# Patient Record
Sex: FEMALE | Race: WHITE | Employment: OTHER | URBAN - METROPOLITAN AREA
[De-identification: names, ages, dates, MRNs, and addresses within clinical notes are randomized per-mention and may not be internally consistent; named-entity substitution may affect disease eponyms.]

---

## 2017-02-08 ENCOUNTER — HOSPITAL ENCOUNTER (OUTPATIENT)
Dept: MAMMOGRAPHY | Age: 72
Discharge: HOME | End: 2017-02-08
Attending: FAMILY MEDICINE
Payer: MEDICARE

## 2017-02-08 DIAGNOSIS — Z12.31 VISIT FOR SCREENING MAMMOGRAM: ICD-10-CM

## 2017-02-08 PROCEDURE — 77067 SCR MAMMO BI INCL CAD: CPT

## 2017-03-07 ENCOUNTER — HOSPITAL ENCOUNTER (OUTPATIENT)
Dept: MAMMOGRAPHY | Age: 72
Discharge: HOME | End: 2017-03-07
Attending: FAMILY MEDICINE
Payer: MEDICARE

## 2017-03-07 DIAGNOSIS — Z78.0 MENOPAUSE: ICD-10-CM

## 2017-03-07 PROCEDURE — 77080 DXA BONE DENSITY AXIAL: CPT

## 2018-02-10 ENCOUNTER — HOSPITAL ENCOUNTER (OUTPATIENT)
Dept: MAMMOGRAPHY | Age: 73
Discharge: HOME | End: 2018-02-10
Attending: FAMILY MEDICINE
Payer: MEDICARE

## 2018-02-10 DIAGNOSIS — Z12.39 SCREENING BREAST EXAMINATION: ICD-10-CM

## 2018-02-10 PROCEDURE — 77067 SCR MAMMO BI INCL CAD: CPT

## 2018-08-20 PROBLEM — I49.5 SICK SINUS SYNDROME (HCC): Status: ACTIVE | Noted: 2018-08-20

## 2019-02-21 ENCOUNTER — HOSPITAL ENCOUNTER (OUTPATIENT)
Dept: MAMMOGRAPHY | Age: 74
Discharge: HOME | End: 2019-02-21
Attending: FAMILY MEDICINE
Payer: MEDICARE

## 2019-02-21 DIAGNOSIS — Z12.31 VISIT FOR SCREENING MAMMOGRAM: ICD-10-CM

## 2019-02-21 PROCEDURE — 77067 SCR MAMMO BI INCL CAD: CPT

## 2019-03-27 ENCOUNTER — HOSPITAL ENCOUNTER (INPATIENT)
Age: 74
LOS: 5 days | Discharge: HOME | DRG: 189 | End: 2019-04-01
Attending: EMERGENCY MEDICINE | Admitting: INTERNAL MEDICINE
Payer: MEDICARE

## 2019-03-27 ENCOUNTER — APPOINTMENT (OUTPATIENT)
Dept: GENERAL RADIOLOGY | Age: 74
DRG: 189 | End: 2019-03-27
Attending: EMERGENCY MEDICINE
Payer: MEDICARE

## 2019-03-27 DIAGNOSIS — I16.0 HYPERTENSIVE URGENCY: ICD-10-CM

## 2019-03-27 DIAGNOSIS — J81.0 ACUTE PULMONARY EDEMA (HCC): Primary | ICD-10-CM

## 2019-03-27 DIAGNOSIS — N18.9 CHRONIC KIDNEY DISEASE, UNSPECIFIED CKD STAGE: ICD-10-CM

## 2019-03-27 DIAGNOSIS — J93.83 SPONTANEOUS PNEUMOTHORAX: ICD-10-CM

## 2019-03-27 PROBLEM — J81.1 PULMONARY EDEMA: Status: ACTIVE | Noted: 2019-03-27

## 2019-03-27 LAB
ALBUMIN SERPL-MCNC: 3.6 G/DL (ref 3.2–4.6)
ALBUMIN/GLOB SERPL: 0.8 {RATIO} (ref 1.2–3.5)
ALP SERPL-CCNC: 85 U/L (ref 50–136)
ALT SERPL-CCNC: 25 U/L (ref 12–65)
ANION GAP SERPL CALC-SCNC: 10 MMOL/L (ref 7–16)
ARTERIAL PATENCY WRIST A: YES
AST SERPL-CCNC: 40 U/L (ref 15–37)
ATRIAL RATE: 130 BPM
BASE DEFICIT BLD-SCNC: 3 MMOL/L
BASOPHILS # BLD: 0.1 K/UL (ref 0–0.2)
BASOPHILS NFR BLD: 1 % (ref 0–2)
BDY SITE: ABNORMAL
BILIRUB SERPL-MCNC: 0.6 MG/DL (ref 0.2–1.1)
BNP SERPL-MCNC: 242 PG/ML
BODY TEMPERATURE: 98.6
BUN SERPL-MCNC: 19 MG/DL (ref 8–23)
CALCIUM SERPL-MCNC: 9.2 MG/DL (ref 8.3–10.4)
CALCULATED P AXIS, ECG09: 70 DEGREES
CALCULATED R AXIS, ECG10: 85 DEGREES
CALCULATED T AXIS, ECG11: 39 DEGREES
CHLORIDE SERPL-SCNC: 108 MMOL/L (ref 98–107)
CO2 BLD-SCNC: 25 MMOL/L
CO2 SERPL-SCNC: 22 MMOL/L (ref 21–32)
COLLECT TIME,HTIME: 1510
CREAT SERPL-MCNC: 1.13 MG/DL (ref 0.6–1)
DIAGNOSIS, 93000: NORMAL
DIFFERENTIAL METHOD BLD: ABNORMAL
EOSINOPHIL # BLD: 0.3 K/UL (ref 0–0.8)
EOSINOPHIL NFR BLD: 2 % (ref 0.5–7.8)
ERYTHROCYTE [DISTWIDTH] IN BLOOD BY AUTOMATED COUNT: 13.6 % (ref 11.9–14.6)
EXHALED MINUTE VOLUME, VE: 19.5 L/MIN
GAS FLOW.O2 O2 DELIVERY SYS: ABNORMAL L/MIN
GLOBULIN SER CALC-MCNC: 4.4 G/DL (ref 2.3–3.5)
GLUCOSE SERPL-MCNC: 194 MG/DL (ref 65–100)
HCO3 BLD-SCNC: 23.9 MMOL/L (ref 22–26)
HCT VFR BLD AUTO: 44.4 % (ref 35.8–46.3)
HGB BLD-MCNC: 14.5 G/DL (ref 11.7–15.4)
IMM GRANULOCYTES # BLD AUTO: 0.1 K/UL (ref 0–0.5)
IMM GRANULOCYTES NFR BLD AUTO: 1 % (ref 0–5)
LACTATE BLD-SCNC: 2.29 MMOL/L (ref 0.5–1.9)
LYMPHOCYTES # BLD: 4.5 K/UL (ref 0.5–4.6)
LYMPHOCYTES NFR BLD: 37 % (ref 13–44)
MCH RBC QN AUTO: 33.2 PG (ref 26.1–32.9)
MCHC RBC AUTO-ENTMCNC: 32.7 G/DL (ref 31.4–35)
MCV RBC AUTO: 101.6 FL (ref 79.6–97.8)
MONOCYTES # BLD: 0.7 K/UL (ref 0.1–1.3)
MONOCYTES NFR BLD: 5 % (ref 4–12)
NEUTS SEG # BLD: 6.4 K/UL (ref 1.7–8.2)
NEUTS SEG NFR BLD: 53 % (ref 43–78)
NRBC # BLD: 0 K/UL (ref 0–0.2)
O2/TOTAL GAS SETTING VFR VENT: 40 %
P-R INTERVAL, ECG05: 136 MS
PCO2 BLD: 47.1 MMHG (ref 35–45)
PEEP RESPIRATORY: 12 CMH2O
PH BLD: 7.31 [PH] (ref 7.35–7.45)
PLATELET # BLD AUTO: 229 K/UL (ref 150–450)
PMV BLD AUTO: 11.5 FL (ref 9.4–12.3)
PO2 BLD: 93 MMHG (ref 75–100)
POTASSIUM SERPL-SCNC: 3.9 MMOL/L (ref 3.5–5.1)
PROT SERPL-MCNC: 8 G/DL (ref 6.3–8.2)
Q-T INTERVAL, ECG07: 380 MS
QRS DURATION, ECG06: 134 MS
QTC CALCULATION (BEZET), ECG08: 544 MS
RBC # BLD AUTO: 4.37 M/UL (ref 4.05–5.2)
SAO2 % BLD: 96 % (ref 95–98)
SERVICE CMNT-IMP: ABNORMAL
SODIUM SERPL-SCNC: 140 MMOL/L (ref 136–145)
SPECIMEN TYPE: ABNORMAL
TOTAL RESP. RATE, ITRR: 28
TROPONIN I BLD-MCNC: 0.02 NG/ML (ref 0.02–0.05)
VENTRICULAR RATE, ECG03: 123 BPM
WBC # BLD AUTO: 12 K/UL (ref 4.3–11.1)

## 2019-03-27 PROCEDURE — 74011250637 HC RX REV CODE- 250/637: Performed by: INTERNAL MEDICINE

## 2019-03-27 PROCEDURE — 85025 COMPLETE CBC W/AUTO DIFF WBC: CPT

## 2019-03-27 PROCEDURE — 93005 ELECTROCARDIOGRAM TRACING: CPT | Performed by: EMERGENCY MEDICINE

## 2019-03-27 PROCEDURE — 74011250637 HC RX REV CODE- 250/637: Performed by: EMERGENCY MEDICINE

## 2019-03-27 PROCEDURE — 74011250636 HC RX REV CODE- 250/636: Performed by: EMERGENCY MEDICINE

## 2019-03-27 PROCEDURE — 36600 WITHDRAWAL OF ARTERIAL BLOOD: CPT

## 2019-03-27 PROCEDURE — 83605 ASSAY OF LACTIC ACID: CPT

## 2019-03-27 PROCEDURE — 74011250636 HC RX REV CODE- 250/636: Performed by: INTERNAL MEDICINE

## 2019-03-27 PROCEDURE — 99285 EMERGENCY DEPT VISIT HI MDM: CPT | Performed by: EMERGENCY MEDICINE

## 2019-03-27 PROCEDURE — 71045 X-RAY EXAM CHEST 1 VIEW: CPT

## 2019-03-27 PROCEDURE — 65660000000 HC RM CCU STEPDOWN

## 2019-03-27 PROCEDURE — 96374 THER/PROPH/DIAG INJ IV PUSH: CPT | Performed by: EMERGENCY MEDICINE

## 2019-03-27 PROCEDURE — 82803 BLOOD GASES ANY COMBINATION: CPT

## 2019-03-27 PROCEDURE — 83880 ASSAY OF NATRIURETIC PEPTIDE: CPT

## 2019-03-27 PROCEDURE — 80053 COMPREHEN METABOLIC PANEL: CPT

## 2019-03-27 PROCEDURE — 94660 CPAP INITIATION&MGMT: CPT

## 2019-03-27 PROCEDURE — 84484 ASSAY OF TROPONIN QUANT: CPT

## 2019-03-27 RX ORDER — ONDANSETRON 2 MG/ML
4 INJECTION INTRAMUSCULAR; INTRAVENOUS
Status: DISCONTINUED | OUTPATIENT
Start: 2019-03-27 | End: 2019-04-01 | Stop reason: HOSPADM

## 2019-03-27 RX ORDER — SIMVASTATIN 10 MG/1
10 TABLET, FILM COATED ORAL
Status: DISCONTINUED | OUTPATIENT
Start: 2019-03-27 | End: 2019-04-01 | Stop reason: HOSPADM

## 2019-03-27 RX ORDER — MORPHINE SULFATE 2 MG/ML
2 INJECTION, SOLUTION INTRAMUSCULAR; INTRAVENOUS
Status: DISCONTINUED | OUTPATIENT
Start: 2019-03-27 | End: 2019-04-01 | Stop reason: HOSPADM

## 2019-03-27 RX ORDER — DILTIAZEM HYDROCHLORIDE 180 MG/1
360 CAPSULE, COATED, EXTENDED RELEASE ORAL DAILY
Status: DISCONTINUED | OUTPATIENT
Start: 2019-03-27 | End: 2019-03-28

## 2019-03-27 RX ORDER — SODIUM CHLORIDE 0.9 % (FLUSH) 0.9 %
5-40 SYRINGE (ML) INJECTION AS NEEDED
Status: DISCONTINUED | OUTPATIENT
Start: 2019-03-27 | End: 2019-04-01 | Stop reason: HOSPADM

## 2019-03-27 RX ORDER — SODIUM CHLORIDE 0.9 % (FLUSH) 0.9 %
5-40 SYRINGE (ML) INJECTION EVERY 8 HOURS
Status: DISCONTINUED | OUTPATIENT
Start: 2019-03-27 | End: 2019-04-01 | Stop reason: HOSPADM

## 2019-03-27 RX ORDER — FUROSEMIDE 10 MG/ML
40 INJECTION INTRAMUSCULAR; INTRAVENOUS 2 TIMES DAILY
Status: DISCONTINUED | OUTPATIENT
Start: 2019-03-28 | End: 2019-03-29

## 2019-03-27 RX ORDER — TRIAMTERENE AND HYDROCHLOROTHIAZIDE 75; 50 MG/1; MG/1
1 TABLET ORAL DAILY
Status: DISCONTINUED | OUTPATIENT
Start: 2019-03-27 | End: 2019-04-01 | Stop reason: HOSPADM

## 2019-03-27 RX ORDER — ACETAMINOPHEN 325 MG/1
650 TABLET ORAL
Status: DISCONTINUED | OUTPATIENT
Start: 2019-03-27 | End: 2019-04-01 | Stop reason: HOSPADM

## 2019-03-27 RX ORDER — ENOXAPARIN SODIUM 100 MG/ML
40 INJECTION SUBCUTANEOUS EVERY 24 HOURS
Status: DISCONTINUED | OUTPATIENT
Start: 2019-03-27 | End: 2019-04-01 | Stop reason: HOSPADM

## 2019-03-27 RX ORDER — GUAIFENESIN 100 MG/5ML
324 LIQUID (ML) ORAL
Status: COMPLETED | OUTPATIENT
Start: 2019-03-27 | End: 2019-03-27

## 2019-03-27 RX ORDER — ASPIRIN 81 MG/1
81 TABLET ORAL DAILY
Status: DISCONTINUED | OUTPATIENT
Start: 2019-03-28 | End: 2019-04-01 | Stop reason: HOSPADM

## 2019-03-27 RX ORDER — FUROSEMIDE 10 MG/ML
40 INJECTION INTRAMUSCULAR; INTRAVENOUS
Status: COMPLETED | OUTPATIENT
Start: 2019-03-27 | End: 2019-03-27

## 2019-03-27 RX ORDER — METOPROLOL SUCCINATE 50 MG/1
100 TABLET, EXTENDED RELEASE ORAL DAILY
Status: DISCONTINUED | OUTPATIENT
Start: 2019-03-27 | End: 2019-03-28

## 2019-03-27 RX ADMIN — SIMVASTATIN 10 MG: 10 TABLET, FILM COATED ORAL at 21:03

## 2019-03-27 RX ADMIN — TRIAMTERENE AND HYDROCHLOROTHIAZIDE 1 TABLET: 75; 50 TABLET ORAL at 19:58

## 2019-03-27 RX ADMIN — Medication 10 ML: at 21:04

## 2019-03-27 RX ADMIN — FUROSEMIDE 40 MG: 10 INJECTION, SOLUTION INTRAMUSCULAR; INTRAVENOUS at 15:34

## 2019-03-27 RX ADMIN — ENOXAPARIN SODIUM 40 MG: 40 INJECTION SUBCUTANEOUS at 19:49

## 2019-03-27 RX ADMIN — ASPIRIN 81 MG 324 MG: 81 TABLET ORAL at 15:34

## 2019-03-27 RX ADMIN — DILTIAZEM HYDROCHLORIDE 360 MG: 180 CAPSULE, COATED, EXTENDED RELEASE ORAL at 19:49

## 2019-03-27 RX ADMIN — METOPROLOL SUCCINATE 100 MG: 50 TABLET, EXTENDED RELEASE ORAL at 19:49

## 2019-03-27 NOTE — PROGRESS NOTES
Pt arrived to room 816 via stretcher from the ER. Pt alert oriented times 3 at this time. Pt denies pain or distress at this time. Pt on 3  L NC with O2 sat 95% at this time. Pt has no skin breakdown noted at this time. Pt skin assessed with Leonel Hill RN. Pt oriented to room and surroundings. Pt encouraged to call for assistance if needed call light in reach, door open will monitor.

## 2019-03-27 NOTE — ED TRIAGE NOTES
Pt arrives per EMS from [employer] course. Pt was golfing when began to have trouble breathing. EMS found her sitting upright, struggling to breath. Audible gurgling per EMS. Hx CHF. Per EMS overloaded sound. Oxygen sat 88% on RA. Started on CPAP. Given 1 in. Nitro, 125mg Solumedrol. Pt responded well on CPAP. Color better. No longer struggling. Oxygen sats better and now able to speak in complete sentences.

## 2019-03-27 NOTE — PROGRESS NOTES
TRANSFER - IN REPORT: 
 
Verbal report received from Yael Parker on Fahad Syed  being received from ER for routine progression of care Report consisted of patients Situation, Background, Assessment and  
Recommendations(SBAR). Information from the following report(s) SBAR and ED Summary was reviewed with the receiving nurse. Opportunity for questions and clarification was provided. Assessment completed upon patients arrival to unit and care assumed.

## 2019-03-27 NOTE — H&P
Hospitalist H&P Note Admit Date:  3/27/2019  2:09 PM  
Name:  Perlita Brennan Age:  75 y.o. 
:  1945 MRN:  [id number] PCP:  Violette Boone MD 
Treatment Team: Attending Provider: Mallory Chandler MD; Primary Nurse: Sanna Blakely Student Nurse: Royal Raines 
 
HPI/Subjective:  
Mrs. Duane Bergeron is a very nice 74 y/o WF with a h/o HTN, HLD, ? SSS who presents to the ED today with acute onset SOB. She was out golfing with friends and had finished 12-13 holes when she suddenly became very short of breath. She had felt well this morning and prior to today. She denies any chest pain, palpitations, syncope, dizziness, orthopnea, peripheral edema, weight gain, N/V/D, fevers. On arrival to the ED her initial BP was 211/85; she did not take her BP meds yet today because one of them is a diuretic and she does not want to have to urinate frequently while golfing. CXR showed pulmonary edema and she was placed on Bipap. ABG 7.31, pCO2 47 and normal paO2. She was given lasix IV and eventually taken off Bipap, but then became very short of breath after walking to the bathroom (but apparently not hypoxic) and was then put back on Bipap. She is now on 3L NC with O2 sats in the mid 90s. She is a lifetime non-smoker, not diabetic, mother had CVD. EKG today shows LBBB which appears new since 2018, but patient denies chest pain. Currently feels much better, wants to go home but understands that she needs to stay. Hospitalist consulted for admission and further management. 10 systems reviewed and negative except as noted in HPI. Past Medical History:  
Diagnosis Date  Essential hypertension  Hyperlipidemia LDL goal <100   
 S/P colonoscopy 2017 to  Past Surgical History:  
Procedure Laterality Date  HX BREAST BIOPSY Right  HX BREAST BIOPSY Left  HX COLONOSCOPY    
 about 5 years ago/ Normal  
 HX LEDY AND BSO Allergies Allergen Reactions  Lisinopril Angioedema Social History Tobacco Use  Smoking status: Never Smoker  Smokeless tobacco: Never Used Substance Use Topics  Alcohol use: Yes Alcohol/week: 2.4 oz Types: 4 Standard drinks or equivalent per week Family History Problem Relation Age of Onset  Heart Disease Mother   
     circulation issues  No Known Problems Father  Breast Cancer Neg Hx Immunization History Administered Date(s) Administered  Influenza Vaccine 10/01/2015, 2018  Pneumococcal Conjugate (PCV-13) 2016  Pneumococcal Vaccine (Unspecified Type) 2013  Td 2015 PTA Medications: 
Prior to Admission Medications Prescriptions Last Dose Informant Patient Reported? Taking?  
aspirin delayed-release 81 mg tablet   Yes No  
Sig: Take  by mouth daily. biotin 2,500 mcg tab   Yes No  
Sig: Take  by mouth two (2) times a day. cholecalciferol, vitamin D3, 2,000 unit tab   Yes No  
Sig: Take  by mouth. dilTIAZem ER (CARDIZEM LA) 360 mg Tb24 tablet   No No  
Sig: TAKE ONE TABLET BY MOUTH DAILY  
fluocinolone (DERMA-SMOOTHE/FS SCALP OIL) 0.01 % oil   No No  
Si mL by Scalp route every Monday and Friday. metoprolol succinate (TOPROL-XL) 100 mg tablet   No No  
Sig: TAKE ONE TABLET BY MOUTH DAILY  
nitroglycerin (NITROSTAT) 0.4 mg SL tablet   No No  
Si Tab by SubLINGual route every five (5) minutes as needed for Chest Pain. Indications: ANGINA  
omeprazole (PRILOSEC) 20 mg capsule   Yes No  
Sig: Take 20 mg by mouth daily. simvastatin (ZOCOR) 40 mg tablet   No No  
Sig: TAKE ONE TABLET BY MOUTH EACH NIGHT AT BEDTIME  
triamterene-hydroCHLOROthiazide (MAXZIDE) 75-50 mg per tablet   No No  
Sig: TAKE ONE TABLET BY MOUTH DAILY Facility-Administered Medications: None Objective:  
 
Patient Vitals for the past 24 hrs: 
 Temp Pulse Resp BP SpO2  
19 1737    184/79 94 % 19 1716    184/79 95 % 03/27/19 1659     95 % 03/27/19 1656    (!) 194/94 95 % 03/27/19 1531    166/76 100 % 03/27/19 1516    192/85 98 % 03/27/19 1501    172/76 93 % 03/27/19 1421    (!) 211/85 99 % 03/27/19 1414     97 % 03/27/19 1413 97.9 °F (36.6 °C) (!) 123 28 (!) 205/115 97 % Oxygen Therapy O2 Sat (%): 94 % (03/27/19 1737) Pulse via Oximetry: 100 beats per minute (03/27/19 1737) O2 Device: Nasal cannula (03/27/19 1659) O2 Flow Rate (L/min): 3 l/min (03/27/19 1659) FIO2 (%): 40 % (03/27/19 1516) Intake/Output Summary (Last 24 hours) at 3/27/2019 1807 Last data filed at 3/27/2019 1645 Gross per 24 hour Intake  Output 400 ml Net -400 ml *Note that automatically entered I/Os may not be accurate; dependent on patient compliance with collection and accurate  by assistants. Physical Exam: 
General:    Well nourished. Alert. NAD. Eyes:   Normal sclera. Extraocular movements intact. HENT:  Normocephalic, atraumatic. Moist mucous membranes CV:   Tachycardia, reg rhythm. No m/r/g. Peripheral pulses 2+. Capillary refill <2s. Lungs:  Bibasilar crackles, 3L NC O2. Abdomen: Soft, nontender, nondistended. Extremities: Warm and dry. No cyanosis or edema. Neurologic: CN II-XII grossly intact. Sensation intact. Skin:     No rashes or jaundice. Normal coloration Psych:  Normal mood and affect. I reviewed the labs, imaging, EKGs, telemetry, and other studies done this admission. Data Review:  
Recent Results (from the past 24 hour(s)) EKG, 12 LEAD, INITIAL Collection Time: 03/27/19  2:15 PM  
Result Value Ref Range Ventricular Rate 123 BPM  
 Atrial Rate 130 BPM  
 P-R Interval 136 ms QRS Duration 134 ms Q-T Interval 380 ms QTC Calculation (Bezet) 544 ms Calculated P Axis 70 degrees Calculated R Axis 85 degrees Calculated T Axis 39 degrees Diagnosis    
  !! AGE AND GENDER SPECIFIC ECG ANALYSIS !! Sinus tachycardia Non-specific intra-ventricular conduction block Cannot rule out Anteroseptal infarct , age undetermined Abnormal ECG No previous ECGs available Confirmed by Leonel Villagomez MD (), Artemio Brown ([id number]) on 3/27/2019 2:31:52 PM 
  
POC TROPONIN-I Collection Time: 03/27/19  2:17 PM  
Result Value Ref Range Troponin-I (POC) 0.02 0.02 - 0.05 ng/ml CBC WITH AUTOMATED DIFF Collection Time: 03/27/19  2:18 PM  
Result Value Ref Range WBC 12.0 (H) 4.3 - 11.1 K/uL  
 RBC 4.37 4.05 - 5.2 M/uL  
 HGB 14.5 11.7 - 15.4 g/dL HCT 44.4 35.8 - 46.3 % .6 (H) 79.6 - 97.8 FL  
 MCH 33.2 (H) 26.1 - 32.9 PG  
 MCHC 32.7 31.4 - 35.0 g/dL  
 RDW 13.6 11.9 - 14.6 % PLATELET 268 537 - 683 K/uL MPV 11.5 9.4 - 12.3 FL ABSOLUTE NRBC 0.00 0.0 - 0.2 K/uL  
 DF AUTOMATED NEUTROPHILS 53 43 - 78 % LYMPHOCYTES 37 13 - 44 % MONOCYTES 5 4.0 - 12.0 % EOSINOPHILS 2 0.5 - 7.8 % BASOPHILS 1 0.0 - 2.0 % IMMATURE GRANULOCYTES 1 0.0 - 5.0 %  
 ABS. NEUTROPHILS 6.4 1.7 - 8.2 K/UL  
 ABS. LYMPHOCYTES 4.5 0.5 - 4.6 K/UL  
 ABS. MONOCYTES 0.7 0.1 - 1.3 K/UL  
 ABS. EOSINOPHILS 0.3 0.0 - 0.8 K/UL  
 ABS. BASOPHILS 0.1 0.0 - 0.2 K/UL  
 ABS. IMM. GRANS. 0.1 0.0 - 0.5 K/UL METABOLIC PANEL, COMPREHENSIVE Collection Time: 03/27/19  2:18 PM  
Result Value Ref Range Sodium 140 136 - 145 mmol/L Potassium 3.9 3.5 - 5.1 mmol/L Chloride 108 (H) 98 - 107 mmol/L  
 CO2 22 21 - 32 mmol/L Anion gap 10 7 - 16 mmol/L Glucose 194 (H) 65 - 100 mg/dL BUN 19 8 - 23 MG/DL Creatinine 1.13 (H) 0.6 - 1.0 MG/DL  
 GFR est AA >60 >60 ml/min/1.73m2 GFR est non-AA 50 (L) >60 ml/min/1.73m2 Calcium 9.2 8.3 - 10.4 MG/DL Bilirubin, total 0.6 0.2 - 1.1 MG/DL  
 ALT (SGPT) 25 12 - 65 U/L  
 AST (SGOT) 40 (H) 15 - 37 U/L Alk. phosphatase 85 50 - 136 U/L Protein, total 8.0 6.3 - 8.2 g/dL Albumin 3.6 3.2 - 4.6 g/dL Globulin 4.4 (H) 2.3 - 3.5 g/dL A-G Ratio 0.8 (L) 1.2 - 3.5 BNP  
 Collection Time: 03/27/19  2:18 PM  
Result Value Ref Range  (H) 0 pg/mL POC LACTIC ACID Collection Time: 03/27/19  2:19 PM  
Result Value Ref Range Lactic Acid (POC) 2.29 (H) 0.5 - 1.9 mmol/L  
POC G3 Collection Time: 03/27/19  3:13 PM  
Result Value Ref Range Device: CPAP    
 FIO2 (POC) 40 % pH (POC) 7.314 (L) 7.35 - 7.45    
 pCO2 (POC) 47.1 (H) 35 - 45 MMHG  
 pO2 (POC) 93 75 - 100 MMHG  
 HCO3 (POC) 23.9 22 - 26 MMOL/L  
 sO2 (POC) 96 95 - 98 % Base deficit (POC) 3 mmol/L  
 PEEP/CPAP (POC) 12 cmH2O Allens test (POC) YES Total resp. rate 28 Site LEFT RADIAL Patient temp. 98.6 Specimen type (POC) ARTERIAL Performed by Ariadna   
 CO2, POC 25 MMOL/L Exhaled minute volume 19.50 L/min COLLECT TIME 1,510 All Micro Results None Current Facility-Administered Medications Medication Dose Route Frequency  morphine injection 2 mg  2 mg IntraVENous Q4H PRN Current Outpatient Medications Medication Sig  
 dilTIAZem ER (CARDIZEM LA) 360 mg Tb24 tablet TAKE ONE TABLET BY MOUTH DAILY  metoprolol succinate (TOPROL-XL) 100 mg tablet TAKE ONE TABLET BY MOUTH DAILY  fluocinolone (DERMA-SMOOTHE/FS SCALP OIL) 0.01 % oil 5 mL by Scalp route every Monday and Friday.  simvastatin (ZOCOR) 40 mg tablet TAKE ONE TABLET BY MOUTH EACH NIGHT AT BEDTIME  triamterene-hydroCHLOROthiazide (MAXZIDE) 75-50 mg per tablet TAKE ONE TABLET BY MOUTH DAILY  aspirin delayed-release 81 mg tablet Take  by mouth daily.  omeprazole (PRILOSEC) 20 mg capsule Take 20 mg by mouth daily.  cholecalciferol, vitamin D3, 2,000 unit tab Take  by mouth.  nitroglycerin (NITROSTAT) 0.4 mg SL tablet 1 Tab by SubLINGual route every five (5) minutes as needed for Chest Pain. Indications: ANGINA  biotin 2,500 mcg tab Take  by mouth two (2) times a day. Other Studies: Xr Chest [facility] Result Date: 3/27/2019 Chest X-ray INDICATION: 27-year-old female with raspatory distress urographic comparison exams: Chest film dated 6/13/2016 A portable AP view of the chest was obtained. FINDINGS: The lungs are clear. There is central vessel congestion with perihilar edema. .  The heart size is normal.  The bony thorax is intact. IMPRESSION: Central vessel congestion with perihilar edema. Assessment and Plan:  
 
Hospital Problems as of 3/27/2019 Date Reviewed: 2/22/2019 Codes Class Noted - Resolved POA * (Principal) Pulmonary edema ICD-10-CM: J81.1 ICD-9-CM: 066  3/27/2019 - Present Yes Hypertensive urgency ICD-10-CM: I16.0 ICD-9-CM: 401.9  3/27/2019 - Present Yes  
   
 CKD (chronic kidney disease) ICD-10-CM: N18.9 ICD-9-CM: 585.9  3/27/2019 - Present Yes Essential hypertension ICD-10-CM: I10 
ICD-9-CM: 401.9  2/9/2016 - Present Yes Hyperlipidemia LDL goal <100 ICD-10-CM: E78.5 ICD-9-CM: 272.4  2/9/2016 - Present Yes Plan: # Acute pulmonary edema - Suspect secondary to hypertensive urgency, initial POC trop neg.  
 - Off Bipap in ED, sats mid 90s on 3L NC 
 - Con't home BP meds, IV Lasix, trend trops, Is/Os, TTE 
 - EKG is changed from 8/2018 with new LBBB at some point since then, but pt has had no chest pain. # Hypertensive urgency 
 - as above # HLD 
 - statin # CKD 3 
 - sCr has ranged 1.3-1.8 since 2017 
 - on admission is better than prior readings. Monitor with diuresis. Discharge planning: Likely home when medically stable, pending clinical course. DVT ppx: Lovenox Code status:  Full Estimated LOS:  Greater than 2 midnights Risk:  high Signed: 
Arcelia Galvez MD

## 2019-03-27 NOTE — ROUTINE PROCESS
TRANSFER - OUT REPORT: 
 
Verbal report given to 8th floor RN on Fahad Syed  being transferred to [facility] for routine progression of care Report consisted of patients Situation, Background, Assessment and  
Recommendations(SBAR). Information from the following report(s) SBAR was reviewed with the receiving nurse. Lines:  
Peripheral IV 03/27/19 Right Wrist (Active) Site Assessment Clean, dry, & intact 3/27/2019  2:20 PM  
Phlebitis Assessment 0 3/27/2019  2:20 PM  
Infiltration Assessment 0 3/27/2019  2:20 PM  
Dressing Status Clean, dry, & intact 3/27/2019  2:20 PM  
  
 
Opportunity for questions and clarification was provided. Patient transported with: 
 O2 @ 3 liters

## 2019-03-27 NOTE — ED PROVIDER NOTES
76-year-old female presents with respiratory distress Patient states that approximately 3 hours prior to arrival she began to develop dyspnea. She's had some cough associated with this episode as well She has no history of asthma or COPD she denies any history of congestive heart failure She denies ill contacts She's had no further vomiting or diarrhea Patient is a nonsmoker Upon EMS arrival, the patient was in respiratory distress, tachypneic, tachycardic and hypotensive Patient placed on BiPAP with progressive improvement of her symptoms She is currently able to speak in short sentences, appears relatively calm The history is provided by the patient and the EMS personnel. Respiratory Distress This is a new problem. The average episode lasts 3 hours. The problem occurs rarely. The current episode started 3 to 5 hours ago. The problem has been rapidly worsening. Associated symptoms include cough. Pertinent negatives include no fever, no headaches, no rhinorrhea, no sore throat, no ear pain, no neck pain, no sputum production, no orthopnea, no chest pain, no syncope, no vomiting, no abdominal pain and no rash. It is unknown what precipitated the problem. She has tried nothing for the symptoms. Associated medical issues do not include asthma, COPD or heart failure. Past Medical History:  
Diagnosis Date  Essential hypertension  Hyperlipidemia LDL goal <100   
 S/P colonoscopy 2017 2022 to 2027 Past Surgical History:  
Procedure Laterality Date  HX BREAST BIOPSY Right  HX BREAST BIOPSY Left  HX COLONOSCOPY    
 about 5 years ago/ Normal  
 HX LEDY AND BSO Family History:  
Problem Relation Age of Onset  Heart Disease Mother   
     circulation issues  No Known Problems Father  Breast Cancer Neg Hx Social History Socioeconomic History  Marital status:  Spouse name: Not on file  Number of children: Not on file  Years of education: Not on file  Highest education level: Not on file Occupational History  Not on file Social Needs  Financial resource strain: Not on file  Food insecurity:  
  Worry: Not on file Inability: Not on file  Transportation needs:  
  Medical: Not on file Non-medical: Not on file Tobacco Use  Smoking status: Never Smoker  Smokeless tobacco: Never Used Substance and Sexual Activity  Alcohol use: Yes Alcohol/week: 2.4 oz Types: 4 Standard drinks or equivalent per week  Drug use: No  
 Sexual activity: Not on file Lifestyle  Physical activity:  
  Days per week: Not on file Minutes per session: Not on file  Stress: Not on file Relationships  Social connections:  
  Talks on phone: Not on file Gets together: Not on file Attends Ca[employer] service: Not on file Active member of club or organization: Not on file Attends meetings of clubs or organizations: Not on file Relationship status: Not on file  Intimate partner violence:  
  Fear of current or ex partner: Not on file Emotionally abused: Not on file Physically abused: Not on file Forced sexual activity: Not on file Other Topics Concern  Not on file Social History Narrative  Not on file ALLERGIES: Lisinopril Review of Systems Constitutional: Negative for chills and fever. HENT: Negative for congestion, ear pain, rhinorrhea and sore throat. Eyes: Negative for photophobia and discharge. Respiratory: Positive for cough. Negative for sputum production and shortness of breath. Cardiovascular: Negative for chest pain, palpitations, orthopnea and syncope. Gastrointestinal: Negative for abdominal pain, constipation, diarrhea and vomiting. Endocrine: Negative for cold intolerance and heat intolerance. Genitourinary: Negative for dysuria and flank pain. Musculoskeletal: Negative for arthralgias, myalgias and neck pain. Skin: Negative for rash and wound. Allergic/Immunologic: Negative for environmental allergies and food allergies. Neurological: Negative for syncope and headaches. Hematological: Negative for adenopathy. Does not bruise/bleed easily. Psychiatric/Behavioral: Negative for dysphoric mood. The patient is not nervous/anxious. All other systems reviewed and are negative. Vitals:  
 03/27/19 1413 03/27/19 1421 BP: (!) 205/115 (!) 211/85 Pulse: (!) 123 Resp: 28 Temp: 97.9 °F (36.6 °C) SpO2: 97% 99% Weight: 95.7 kg (211 lb) Height: 5' 2\" (1.575 m) Physical Exam  
Constitutional: She is oriented to person, place, and time. She appears well-developed and well-nourished. She appears distressed. HENT:  
Head: Normocephalic and atraumatic. Mouth/Throat: Oropharynx is clear and moist. No oropharyngeal exudate. Eyes: Pupils are equal, round, and reactive to light. Conjunctivae and EOM are normal.  
Neck: Normal range of motion. Neck supple. No JVD present. Cardiovascular: Regular rhythm, normal heart sounds and intact distal pulses. Tachycardia present. Exam reveals no gallop and no friction rub. No murmur heard. Pulmonary/Chest: Effort normal. Tachypnea noted. She has decreased breath sounds. She has rales. Abdominal: Soft. Normal appearance and bowel sounds are normal. She exhibits no distension and no mass. There is no hepatosplenomegaly. There is no tenderness. Musculoskeletal: Normal range of motion. She exhibits no edema or deformity. Neurological: She is alert and oriented to person, place, and time. She has normal strength. No cranial nerve deficit or sensory deficit. She displays a negative Romberg sign. Gait normal.  
Skin: Skin is warm and dry. Capillary refill takes less than 2 seconds. No rash noted. Psychiatric: She has a normal mood and affect.  Her speech is normal and behavior is normal. Judgment and thought content normal. Cognition and memory are normal.  
Nursing note and vitals reviewed. MDM Number of Diagnoses or Management Options Diagnosis management comments: EKG reviewed  
sinus tachycardia, wide complexes with a nonspecific interventricular conduction delay No acute ischemic changes Compared with study from August 2018 QRS complexes are widened significantly Amount and/or Complexity of Data Reviewed Clinical lab tests: ordered and reviewed Tests in the radiology section of CPT®: ordered Tests in the medicine section of CPT®: ordered and reviewed Decide to obtain previous medical records or to obtain history from someone other than the patient: yes Obtain history from someone other than the patient: yes Review and summarize past medical records: yes Discuss the patient with other providers: yes Independent visualization of images, tracings, or specimens: yes Risk of Complications, Morbidity, and/or Mortality Presenting problems: high Diagnostic procedures: high Management options: high General comments: Elements of this note have been dictated via voice recognition software. Text and phrases may be limited by the accuracy of the software. The chart has been reviewed, but errors may still be present. Critical Care Total time providing critical care: 30-74 minutes (65) Patient Progress Patient progress: improved Procedures

## 2019-03-27 NOTE — PROGRESS NOTES
Placed pt on CPAP +12 with large full face mask, minimal leak noted. Pt tolerating CPAP at this time

## 2019-03-28 LAB
ANION GAP SERPL CALC-SCNC: 8 MMOL/L (ref 7–16)
BASOPHILS # BLD: 0 K/UL (ref 0–0.2)
BASOPHILS NFR BLD: 0 % (ref 0–2)
BUN SERPL-MCNC: 21 MG/DL (ref 8–23)
CALCIUM SERPL-MCNC: 9.2 MG/DL (ref 8.3–10.4)
CHLORIDE SERPL-SCNC: 106 MMOL/L (ref 98–107)
CO2 SERPL-SCNC: 27 MMOL/L (ref 21–32)
CREAT SERPL-MCNC: 0.91 MG/DL (ref 0.6–1)
DIFFERENTIAL METHOD BLD: ABNORMAL
EOSINOPHIL # BLD: 0 K/UL (ref 0–0.8)
EOSINOPHIL NFR BLD: 0 % (ref 0.5–7.8)
ERYTHROCYTE [DISTWIDTH] IN BLOOD BY AUTOMATED COUNT: 13.2 % (ref 11.9–14.6)
GLUCOSE SERPL-MCNC: 129 MG/DL (ref 65–100)
HCT VFR BLD AUTO: 40.4 % (ref 35.8–46.3)
HGB BLD-MCNC: 12.9 G/DL (ref 11.7–15.4)
IMM GRANULOCYTES # BLD AUTO: 0.1 K/UL (ref 0–0.5)
IMM GRANULOCYTES NFR BLD AUTO: 1 % (ref 0–5)
LYMPHOCYTES # BLD: 1.1 K/UL (ref 0.5–4.6)
LYMPHOCYTES NFR BLD: 11 % (ref 13–44)
MCH RBC QN AUTO: 32.5 PG (ref 26.1–32.9)
MCHC RBC AUTO-ENTMCNC: 31.9 G/DL (ref 31.4–35)
MCV RBC AUTO: 101.8 FL (ref 79.6–97.8)
MONOCYTES # BLD: 0.2 K/UL (ref 0.1–1.3)
MONOCYTES NFR BLD: 2 % (ref 4–12)
NEUTS SEG # BLD: 8.4 K/UL (ref 1.7–8.2)
NEUTS SEG NFR BLD: 87 % (ref 43–78)
NRBC # BLD: 0 K/UL (ref 0–0.2)
PLATELET # BLD AUTO: 214 K/UL (ref 150–450)
PMV BLD AUTO: 11.3 FL (ref 9.4–12.3)
POTASSIUM SERPL-SCNC: 3.4 MMOL/L (ref 3.5–5.1)
RBC # BLD AUTO: 3.97 M/UL (ref 4.05–5.2)
SODIUM SERPL-SCNC: 141 MMOL/L (ref 136–145)
TROPONIN I SERPL-MCNC: 0.04 NG/ML (ref 0.02–0.05)
WBC # BLD AUTO: 9.6 K/UL (ref 4.3–11.1)

## 2019-03-28 PROCEDURE — 65660000000 HC RM CCU STEPDOWN

## 2019-03-28 PROCEDURE — 36415 COLL VENOUS BLD VENIPUNCTURE: CPT

## 2019-03-28 PROCEDURE — C8929 TTE W OR WO FOL WCON,DOPPLER: HCPCS

## 2019-03-28 PROCEDURE — 74011250636 HC RX REV CODE- 250/636: Performed by: INTERNAL MEDICINE

## 2019-03-28 PROCEDURE — 80048 BASIC METABOLIC PNL TOTAL CA: CPT

## 2019-03-28 PROCEDURE — 84484 ASSAY OF TROPONIN QUANT: CPT

## 2019-03-28 PROCEDURE — 74011000250 HC RX REV CODE- 250: Performed by: INTERNAL MEDICINE

## 2019-03-28 PROCEDURE — 85025 COMPLETE CBC W/AUTO DIFF WBC: CPT

## 2019-03-28 PROCEDURE — 74011250637 HC RX REV CODE- 250/637: Performed by: INTERNAL MEDICINE

## 2019-03-28 PROCEDURE — 93005 ELECTROCARDIOGRAM TRACING: CPT | Performed by: INTERNAL MEDICINE

## 2019-03-28 RX ORDER — METOPROLOL SUCCINATE 50 MG/1
50 TABLET, EXTENDED RELEASE ORAL DAILY
Status: DISCONTINUED | OUTPATIENT
Start: 2019-03-29 | End: 2019-03-28

## 2019-03-28 RX ORDER — POTASSIUM CHLORIDE 20 MEQ/1
40 TABLET, EXTENDED RELEASE ORAL ONCE
Status: COMPLETED | OUTPATIENT
Start: 2019-03-28 | End: 2019-03-28

## 2019-03-28 RX ADMIN — Medication 10 ML: at 20:55

## 2019-03-28 RX ADMIN — Medication 10 ML: at 11:06

## 2019-03-28 RX ADMIN — METOPROLOL SUCCINATE 100 MG: 50 TABLET, EXTENDED RELEASE ORAL at 08:09

## 2019-03-28 RX ADMIN — SIMVASTATIN 10 MG: 10 TABLET, FILM COATED ORAL at 20:49

## 2019-03-28 RX ADMIN — ASPIRIN 81 MG: 81 TABLET, COATED ORAL at 08:09

## 2019-03-28 RX ADMIN — Medication 5 ML: at 05:28

## 2019-03-28 RX ADMIN — PERFLUTREN 1 ML: 6.52 INJECTION, SUSPENSION INTRAVENOUS at 08:00

## 2019-03-28 RX ADMIN — POTASSIUM CHLORIDE 40 MEQ: 20 TABLET, EXTENDED RELEASE ORAL at 11:06

## 2019-03-28 RX ADMIN — ENOXAPARIN SODIUM 40 MG: 40 INJECTION SUBCUTANEOUS at 20:46

## 2019-03-28 RX ADMIN — FUROSEMIDE 40 MG: 10 INJECTION, SOLUTION INTRAMUSCULAR; INTRAVENOUS at 08:10

## 2019-03-28 RX ADMIN — TRIAMTERENE AND HYDROCHLOROTHIAZIDE 1 TABLET: 75; 50 TABLET ORAL at 08:09

## 2019-03-28 RX ADMIN — DILTIAZEM HYDROCHLORIDE 360 MG: 180 CAPSULE, COATED, EXTENDED RELEASE ORAL at 08:09

## 2019-03-28 NOTE — INTERDISCIPLINARY ROUNDS
Interdisciplinary team rounds were held 3/28/2019 with the following team members:Care Management, Physical Therapy, Physician and Clinical Coordinator and the patient. Plan of care discussed. See clinical pathway and/or care plan for interventions and desired outcomes.

## 2019-03-28 NOTE — PROGRESS NOTES
Pt is stable. Alert and oriented x4. Respirations even and unlabored. Heart sounds regular. Breath sounds clear. Is currently resting in bed. Denies pain. Bed is in low position, wheels locked and call light in reach. Safety measure in place. Pt is stable. SBAR report given to oncoming nurse.

## 2019-03-28 NOTE — PROGRESS NOTES
Pt on remote tele. HR trending 45-50. Pt alert and oriented x4. No signs or symptoms of distress at this time. Family is at bedside. Will monitor.

## 2019-03-28 NOTE — PROGRESS NOTES
Pt sitting in chair resting. Pt is alert and oriented x4. Pt respirations are regular on 3L NC. Pt states no pain or feelings of distress at this time. PT is sinus susie on tele at 49. Call light within reach, door open, will continue to monitor and give report to oncoming RN.

## 2019-03-28 NOTE — PROGRESS NOTES
SBAR report received from [address] [place] [address]. Pt is alert and oriented x4. Is to receive cardizem, metoprolol, and maxzide when approved by pharmacy for high BP and HR. Pt denies pain at this time, safety measures in place, family member at bedside. Pt is stable, will continue to monitor.

## 2019-03-28 NOTE — PROGRESS NOTES
Remote telemetry called to state HR dropping to 40. Dr. Yuridia Agarwal on floor and made aware. Pt sitting up in bed talking with family . No s/sx of distress noted at this time.

## 2019-03-28 NOTE — PROGRESS NOTES
BSR received. Pt resting in bed. Pt is alert and oriented x4. Respirations are regular on 3 L NC. Pt denies any pain or distress at this time. Call light is within reach, pt encouraged to call for help, will monitor.

## 2019-03-28 NOTE — PROGRESS NOTES
Hospitalist Progress Note Admit Date:  3/27/2019  2:09 PM  
Name:  Melania Mcdonald Age:  75 y.o. 
:  1945 MRN:  [id number] PCP:  Parul Ball MD 
Treatment Team: Attending Provider: Rosanna Cross MD; Utilization Review: Curtis Blanco RN; Care Manager: Valentín Cheatham RN 
 
HPI/Subjective:  
74 y/o WF admitted on 3/27 with hypertensive urgency and acute pulmonary edema. 3/28: UOP not recorded but pt says she is urinating frequently. Her breathing is much better, still on O2. Needs to ambulate today. Trops neg overnight. TTE done, pending. Objective:  
 
Patient Vitals for the past 24 hrs: 
 Temp Pulse Resp BP SpO2  
19 0717 97.7 °F (36.5 °C) 64 17 139/75 97 % 19 0259 98 °F (36.7 °C) 66 20 167/78 98 % 19 2227 98.1 °F (36.7 °C) 75 16 145/80 97 % 19 1945    (!) 187/94   
19 1841 97.7 °F (36.5 °C) (!) 104 20 (!) 180/95 95 % 19 1737    184/79 94 % 19 1716    184/79 95 % 19 1659     95 % 19 1656    (!) 194/94 95 % 19 1531    166/76 100 % 19 1516    192/85 98 % 19 1501    172/76 93 % 19 1421    (!) 211/85 99 % 19 1414     97 % 19 1413 97.9 °F (36.6 °C) (!) 123 28 (!) 205/115 97 % Oxygen Therapy O2 Sat (%): 97 % (19 07) Pulse via Oximetry: 100 beats per minute (19 1737) O2 Device: Nasal cannula (19) O2 Flow Rate (L/min): 3 l/min (19) FIO2 (%): 40 % (19 1516) Intake/Output Summary (Last 24 hours) at 3/28/2019 1052 Last data filed at 3/28/2019 8448 Gross per 24 hour Intake 358 ml Output 400 ml Net -42 ml *Note that automatically entered I/Os may not be accurate; dependent on patient compliance with collection and accurate  by techs. General:    Well nourished. Alert. CV:   RRR. No murmur, rub, or gallop. Lungs:   CTAB. No wheezing, rhonchi, or rales. NC O2. Abdomen:   Soft, nontender, nondistended. Extremities: Warm and dry. No cyanosis or edema. Skin:     No rashes or jaundice. Neuro:  No gross focal deficits. Data Review: 
I have reviewed all labs, meds, and studies from the last 24 hours: 
 
Recent Results (from the past 24 hour(s)) EKG, 12 LEAD, INITIAL Collection Time: 03/27/19  2:15 PM  
Result Value Ref Range Ventricular Rate 123 BPM  
 Atrial Rate 130 BPM  
 P-R Interval 136 ms QRS Duration 134 ms Q-T Interval 380 ms QTC Calculation (Bezet) 544 ms Calculated P Axis 70 degrees Calculated R Axis 85 degrees Calculated T Axis 39 degrees Diagnosis    
  !! AGE AND GENDER SPECIFIC ECG ANALYSIS !! Sinus tachycardia Non-specific intra-ventricular conduction block Cannot rule out Anteroseptal infarct , age undetermined Abnormal ECG No previous ECGs available Confirmed by Sanchez Martinez MD (), Rodolfo Lee ([id number]) on 3/27/2019 2:31:52 PM 
  
POC TROPONIN-I Collection Time: 03/27/19  2:17 PM  
Result Value Ref Range Troponin-I (POC) 0.02 0.02 - 0.05 ng/ml CBC WITH AUTOMATED DIFF Collection Time: 03/27/19  2:18 PM  
Result Value Ref Range WBC 12.0 (H) 4.3 - 11.1 K/uL  
 RBC 4.37 4.05 - 5.2 M/uL  
 HGB 14.5 11.7 - 15.4 g/dL HCT 44.4 35.8 - 46.3 % .6 (H) 79.6 - 97.8 FL  
 MCH 33.2 (H) 26.1 - 32.9 PG  
 MCHC 32.7 31.4 - 35.0 g/dL  
 RDW 13.6 11.9 - 14.6 % PLATELET 411 529 - 397 K/uL MPV 11.5 9.4 - 12.3 FL ABSOLUTE NRBC 0.00 0.0 - 0.2 K/uL  
 DF AUTOMATED NEUTROPHILS 53 43 - 78 % LYMPHOCYTES 37 13 - 44 % MONOCYTES 5 4.0 - 12.0 % EOSINOPHILS 2 0.5 - 7.8 % BASOPHILS 1 0.0 - 2.0 % IMMATURE GRANULOCYTES 1 0.0 - 5.0 %  
 ABS. NEUTROPHILS 6.4 1.7 - 8.2 K/UL  
 ABS. LYMPHOCYTES 4.5 0.5 - 4.6 K/UL  
 ABS. MONOCYTES 0.7 0.1 - 1.3 K/UL  
 ABS. EOSINOPHILS 0.3 0.0 - 0.8 K/UL  
 ABS. BASOPHILS 0.1 0.0 - 0.2 K/UL ABS. IMM. GRANS. 0.1 0.0 - 0.5 K/UL METABOLIC PANEL, COMPREHENSIVE Collection Time: 03/27/19  2:18 PM  
Result Value Ref Range Sodium 140 136 - 145 mmol/L Potassium 3.9 3.5 - 5.1 mmol/L Chloride 108 (H) 98 - 107 mmol/L  
 CO2 22 21 - 32 mmol/L Anion gap 10 7 - 16 mmol/L Glucose 194 (H) 65 - 100 mg/dL BUN 19 8 - 23 MG/DL Creatinine 1.13 (H) 0.6 - 1.0 MG/DL  
 GFR est AA >60 >60 ml/min/1.73m2 GFR est non-AA 50 (L) >60 ml/min/1.73m2 Calcium 9.2 8.3 - 10.4 MG/DL Bilirubin, total 0.6 0.2 - 1.1 MG/DL  
 ALT (SGPT) 25 12 - 65 U/L  
 AST (SGOT) 40 (H) 15 - 37 U/L Alk. phosphatase 85 50 - 136 U/L Protein, total 8.0 6.3 - 8.2 g/dL Albumin 3.6 3.2 - 4.6 g/dL Globulin 4.4 (H) 2.3 - 3.5 g/dL A-G Ratio 0.8 (L) 1.2 - 3.5 BNP Collection Time: 03/27/19  2:18 PM  
Result Value Ref Range  (H) 0 pg/mL POC LACTIC ACID Collection Time: 03/27/19  2:19 PM  
Result Value Ref Range Lactic Acid (POC) 2.29 (H) 0.5 - 1.9 mmol/L  
POC G3 Collection Time: 03/27/19  3:13 PM  
Result Value Ref Range Device: CPAP    
 FIO2 (POC) 40 % pH (POC) 7.314 (L) 7.35 - 7.45    
 pCO2 (POC) 47.1 (H) 35 - 45 MMHG  
 pO2 (POC) 93 75 - 100 MMHG  
 HCO3 (POC) 23.9 22 - 26 MMOL/L  
 sO2 (POC) 96 95 - 98 % Base deficit (POC) 3 mmol/L  
 PEEP/CPAP (POC) 12 cmH2O Allens test (POC) YES Total resp. rate 28 Site LEFT RADIAL Patient temp. 98.6 Specimen type (POC) ARTERIAL Performed by SojournerSarahRT   
 CO2, POC 25 MMOL/L Exhaled minute volume 19.50 L/min COLLECT TIME 1,510 METABOLIC PANEL, BASIC Collection Time: 03/28/19  5:07 AM  
Result Value Ref Range Sodium 141 136 - 145 mmol/L Potassium 3.4 (L) 3.5 - 5.1 mmol/L Chloride 106 98 - 107 mmol/L  
 CO2 27 21 - 32 mmol/L Anion gap 8 7 - 16 mmol/L Glucose 129 (H) 65 - 100 mg/dL BUN 21 8 - 23 MG/DL Creatinine 0.91 0.6 - 1.0 MG/DL  
 GFR est AA >60 >60 ml/min/1.73m2 GFR est non-AA >60 >60 ml/min/1.73m2 Calcium 9.2 8.3 - 10.4 MG/DL  
CBC WITH AUTOMATED DIFF Collection Time: 03/28/19  5:07 AM  
Result Value Ref Range WBC 9.6 4.3 - 11.1 K/uL  
 RBC 3.97 (L) 4.05 - 5.2 M/uL  
 HGB 12.9 11.7 - 15.4 g/dL HCT 40.4 35.8 - 46.3 % .8 (H) 79.6 - 97.8 FL  
 MCH 32.5 26.1 - 32.9 PG  
 MCHC 31.9 31.4 - 35.0 g/dL  
 RDW 13.2 11.9 - 14.6 % PLATELET 242 152 - 481 K/uL MPV 11.3 9.4 - 12.3 FL ABSOLUTE NRBC 0.00 0.0 - 0.2 K/uL  
 DF AUTOMATED NEUTROPHILS 87 (H) 43 - 78 % LYMPHOCYTES 11 (L) 13 - 44 % MONOCYTES 2 (L) 4.0 - 12.0 % EOSINOPHILS 0 (L) 0.5 - 7.8 % BASOPHILS 0 0.0 - 2.0 % IMMATURE GRANULOCYTES 1 0.0 - 5.0 %  
 ABS. NEUTROPHILS 8.4 (H) 1.7 - 8.2 K/UL  
 ABS. LYMPHOCYTES 1.1 0.5 - 4.6 K/UL  
 ABS. MONOCYTES 0.2 0.1 - 1.3 K/UL  
 ABS. EOSINOPHILS 0.0 0.0 - 0.8 K/UL  
 ABS. BASOPHILS 0.0 0.0 - 0.2 K/UL  
 ABS. IMM. GRANS. 0.1 0.0 - 0.5 K/UL  
TROPONIN I Collection Time: 03/28/19  5:07 AM  
Result Value Ref Range Troponin-I, Qt. 0.04 0.02 - 0.05 NG/ML All Micro Results None No results found for this visit on 03/27/19. Current Meds: 
Current Facility-Administered Medications Medication Dose Route Frequency  aspirin delayed-release tablet 81 mg  81 mg Oral DAILY  dilTIAZem CD (CARDIZEM CD) capsule 360 mg  360 mg Oral DAILY  metoprolol succinate (TOPROL-XL) XL tablet 100 mg  100 mg Oral DAILY  simvastatin (ZOCOR) tablet 10 mg  10 mg Oral QHS  triamterene-hydroCHLOROthiazide (MAXZIDE) 75-50 mg tablet 1 Tab  1 Tab Oral DAILY  sodium chloride (NS) flush 5-40 mL  5-40 mL IntraVENous Q8H  
 sodium chloride (NS) flush 5-40 mL  5-40 mL IntraVENous PRN  
 acetaminophen (TYLENOL) tablet 650 mg  650 mg Oral Q4H PRN  
 ondansetron (ZOFRAN) injection 4 mg  4 mg IntraVENous Q4H PRN  
 enoxaparin (LOVENOX) injection 40 mg  40 mg SubCUTAneous Q24H  furosemide (LASIX) injection 40 mg  40 mg IntraVENous BID  morphine injection 2 mg  2 mg IntraVENous Q4H PRN Other Studies (last 24 hours): Xr Chest [facility] Result Date: 3/27/2019 Chest X-ray INDICATION: 71-year-old female with raspatory distress urographic comparison exams: Chest film dated 6/13/2016 A portable AP view of the chest was obtained. FINDINGS: The lungs are clear. There is central vessel congestion with perihilar edema. .  The heart size is normal.  The bony thorax is intact. IMPRESSION: Central vessel congestion with perihilar edema. Assessment and Plan:  
 
Hospital Problems as of 3/28/2019 Date Reviewed: 2/22/2019 Codes Class Noted - Resolved POA * (Principal) Pulmonary edema ICD-10-CM: J81.1 ICD-9-CM: 393  3/27/2019 - Present Yes Hypertensive urgency ICD-10-CM: I16.0 ICD-9-CM: 401.9  3/27/2019 - Present Yes  
   
 CKD (chronic kidney disease) ICD-10-CM: N18.9 ICD-9-CM: 585.9  3/27/2019 - Present Yes Essential hypertension ICD-10-CM: I10 
ICD-9-CM: 401.9  2/9/2016 - Present Yes Hyperlipidemia LDL goal <100 ICD-10-CM: E78.5 ICD-9-CM: 272.4  2/9/2016 - Present Yes Plan: # Acute pulmonary edema - Suspect secondary to hypertensive urgency - BPs much better. Wean O2 today, mobilize. - IV lasix today. Trops neg. Repeat CXR tomorrow. - Says she does not want inpt cardiology eval and will f/u outpatient since already established. Will see what TTE looks like today.    
  
# Hypertensive urgency - Resolved. Back on home BP meds. 
  
# HLD 
            - statin 
  
# CKD 3 
            - sCr has ranged 1.3-1.8 since 2017 
            - stable. # Sinus tachycardia 
 - resolved DC planning/Dispo:  Home when stable, 1-2d pending clinical course. Diet:  DIET CARDIAC 
DVT ppx:  Lovenox Signed: 
Venkatesh Vanessa MD

## 2019-03-28 NOTE — PROGRESS NOTES
Remote tele called. Pt is still susie at 52. Pt states feeling fine and appears normal. Dr. Trice Diaz notified and is adjusting morning meds. Will continue to monitor.

## 2019-03-29 ENCOUNTER — APPOINTMENT (OUTPATIENT)
Dept: GENERAL RADIOLOGY | Age: 74
DRG: 189 | End: 2019-03-29
Attending: INTERNAL MEDICINE
Payer: MEDICARE

## 2019-03-29 ENCOUNTER — APPOINTMENT (OUTPATIENT)
Dept: CT IMAGING | Age: 74
DRG: 189 | End: 2019-03-29
Attending: INTERNAL MEDICINE
Payer: MEDICARE

## 2019-03-29 PROBLEM — R00.1 SINUS BRADYCARDIA: Status: ACTIVE | Noted: 2019-03-29

## 2019-03-29 PROBLEM — J93.83 SPONTANEOUS PNEUMOTHORAX: Status: ACTIVE | Noted: 2019-03-29

## 2019-03-29 LAB
ANION GAP SERPL CALC-SCNC: 8 MMOL/L (ref 7–16)
ATRIAL RATE: 41 BPM
BUN SERPL-MCNC: 42 MG/DL (ref 8–23)
CALCIUM SERPL-MCNC: 9.4 MG/DL (ref 8.3–10.4)
CALCULATED P AXIS, ECG09: 84 DEGREES
CALCULATED R AXIS, ECG10: 14 DEGREES
CALCULATED T AXIS, ECG11: 90 DEGREES
CHLORIDE SERPL-SCNC: 105 MMOL/L (ref 98–107)
CO2 SERPL-SCNC: 26 MMOL/L (ref 21–32)
CREAT SERPL-MCNC: 1.65 MG/DL (ref 0.6–1)
DIAGNOSIS, 93000: NORMAL
GLUCOSE SERPL-MCNC: 108 MG/DL (ref 65–100)
P-R INTERVAL, ECG05: 194 MS
POTASSIUM SERPL-SCNC: 3.8 MMOL/L (ref 3.5–5.1)
Q-T INTERVAL, ECG07: 524 MS
QRS DURATION, ECG06: 140 MS
QTC CALCULATION (BEZET), ECG08: 432 MS
SODIUM SERPL-SCNC: 139 MMOL/L (ref 136–145)
VENTRICULAR RATE, ECG03: 41 BPM

## 2019-03-29 PROCEDURE — 36415 COLL VENOUS BLD VENIPUNCTURE: CPT

## 2019-03-29 PROCEDURE — 0W9B30Z DRAINAGE OF LEFT PLEURAL CAVITY WITH DRAINAGE DEVICE, PERCUTANEOUS APPROACH: ICD-10-PCS | Performed by: INTERNAL MEDICINE

## 2019-03-29 PROCEDURE — 77030018846 HC SOL IRR STRL H20 ICUM -A

## 2019-03-29 PROCEDURE — 71250 CT THORAX DX C-: CPT

## 2019-03-29 PROCEDURE — 32551 INSERTION OF CHEST TUBE: CPT | Performed by: INTERNAL MEDICINE

## 2019-03-29 PROCEDURE — 74011250637 HC RX REV CODE- 250/637: Performed by: INTERNAL MEDICINE

## 2019-03-29 PROCEDURE — 74011250636 HC RX REV CODE- 250/636: Performed by: INTERNAL MEDICINE

## 2019-03-29 PROCEDURE — 80048 BASIC METABOLIC PNL TOTAL CA: CPT

## 2019-03-29 PROCEDURE — 99223 1ST HOSP IP/OBS HIGH 75: CPT | Performed by: INTERNAL MEDICINE

## 2019-03-29 PROCEDURE — 71046 X-RAY EXAM CHEST 2 VIEWS: CPT

## 2019-03-29 PROCEDURE — 65660000000 HC RM CCU STEPDOWN

## 2019-03-29 PROCEDURE — 71045 X-RAY EXAM CHEST 1 VIEW: CPT

## 2019-03-29 PROCEDURE — C1729 CATH, DRAINAGE: HCPCS | Performed by: INTERNAL MEDICINE

## 2019-03-29 PROCEDURE — 76040000025: Performed by: INTERNAL MEDICINE

## 2019-03-29 RX ORDER — TRAMADOL HYDROCHLORIDE 50 MG/1
50 TABLET ORAL
Status: DISCONTINUED | OUTPATIENT
Start: 2019-03-29 | End: 2019-04-01 | Stop reason: HOSPADM

## 2019-03-29 RX ORDER — HYDRALAZINE HYDROCHLORIDE 25 MG/1
25 TABLET, FILM COATED ORAL 3 TIMES DAILY
Status: DISCONTINUED | OUTPATIENT
Start: 2019-03-29 | End: 2019-03-30

## 2019-03-29 RX ADMIN — TRIAMTERENE AND HYDROCHLOROTHIAZIDE 1 TABLET: 75; 50 TABLET ORAL at 08:21

## 2019-03-29 RX ADMIN — MORPHINE SULFATE 2 MG: 2 INJECTION, SOLUTION INTRAMUSCULAR; INTRAVENOUS at 19:39

## 2019-03-29 RX ADMIN — ASPIRIN 81 MG: 81 TABLET, COATED ORAL at 08:21

## 2019-03-29 RX ADMIN — HYDRALAZINE HYDROCHLORIDE 25 MG: 25 TABLET, FILM COATED ORAL at 15:24

## 2019-03-29 RX ADMIN — HYDRALAZINE HYDROCHLORIDE 25 MG: 25 TABLET, FILM COATED ORAL at 08:25

## 2019-03-29 RX ADMIN — ENOXAPARIN SODIUM 40 MG: 40 INJECTION SUBCUTANEOUS at 19:39

## 2019-03-29 RX ADMIN — SIMVASTATIN 10 MG: 10 TABLET, FILM COATED ORAL at 21:50

## 2019-03-29 RX ADMIN — Medication 10 ML: at 21:50

## 2019-03-29 RX ADMIN — HYDRALAZINE HYDROCHLORIDE 25 MG: 25 TABLET, FILM COATED ORAL at 21:50

## 2019-03-29 RX ADMIN — MORPHINE SULFATE 2 MG: 2 INJECTION, SOLUTION INTRAMUSCULAR; INTRAVENOUS at 15:20

## 2019-03-29 RX ADMIN — Medication 5 ML: at 08:30

## 2019-03-29 NOTE — PROGRESS NOTES
Bedside report received from Naima Cortes RN. NO distress on 2l Via nc. Resp even and unlabored at rest in bed. On remote telemetry at present. Instructed pt to call should needs arise. Pt voiced understanding Call light within reach.

## 2019-03-29 NOTE — INTERVAL H&P NOTE
H&P Update: 
Radha Tran was seen and examined. History and physical has been reviewed. The patient has been examined.  There have been no significant clinical changes since the completion of the originally dated History and Physical. 
 
Signed By: Rambo Moss MD   
 March 29, 2019 1:56 PM

## 2019-03-29 NOTE — PROGRESS NOTES
CM met with patient to discuss d/c plan. Patient alert and oriented x4. Patient verified information no changes needed. Patient states she lives alone in a one level home with one step to enter into the home. States she's independent with her ADL's and has no DME's in the home. Patient states she do plan on returning back home with no needs identified. CM will continue to monitor if any any necessary changes may occur. Care Management Interventions PCP Verified by CM: Yes(Mirlande Shukla, ) Mode of Transport at Discharge: Other (see comment)(family) Transition of Care Consult (CM Consult): Discharge Planning Discharge Durable Medical Equipment: No 
Physical Therapy Consult: No 
Occupational Therapy Consult: No 
Speech Therapy Consult: No 
Current Support Network: Own Home, Lives Alone Confirm Follow Up Transport: Family Plan discussed with Pt/Family/Caregiver: Yes Freedom of Choice Offered: Yes [employer] Information Provided?: No 
Discharge Location Discharge Placement: Home

## 2019-03-29 NOTE — PROGRESS NOTES
Morphine 1mg given slow IVP for c/o 10/10 left back pain, r/t CT placement. Will continue to monitor.

## 2019-03-29 NOTE — PROGRESS NOTES
Pt lying in bed resting on 1 L NC. Pt is alert and oriented x4. Pt is on remote tele NSR 69 bpm. Pt reports pain in right side of her back 7/10 and requests morphine before she goes to CT. Prn morphine 2 mg IV given at this time. VSS, will continue to monitor patient.

## 2019-03-29 NOTE — PROGRESS NOTES
Quiet shift. Resp even and unlabored on remote telemetry with Normal Sinus Vishnu with HR 47. Will update oncoming nurse.

## 2019-03-29 NOTE — PROGRESS NOTES
End of shift report PM handoff RN: Kimberley Plata Pain:  Patient received Morphine 1mg IVP for c/o 10/10 left back pain r/t left CT placement, see MAR. Neuro: A/Ox3. No c/o numbness or tingling Cardio:  S1/S2  WNL for specified parameters. Resp:  2LNC, clear/diminished. Symmetric chest wall excursion. GI/:  Voiding. Urine is yellow, clear. Last BM [id number] x 1. Diet: Cardiac Regular diet. Patient tolerating. See I&O's for details. Ambulation:  Patient ambulates independently. No falls during shift. Linen change: not charted. Additional info: CXR indicated left large apical pneumothorax; Uresil placed.  
 
EOS handoff RN:

## 2019-03-29 NOTE — PROGRESS NOTES
Hospitalist Progress Note Admit Date:  3/27/2019  2:09 PM  
Name:  Allan Menendez Age:  75 y.o. 
:  1945 MRN:  [id number] PCP:  Prem Castrejon MD 
Treatment Team: Attending Provider: Elisabeth Jean MD; Utilization Review: Karis Rivas RN; Care Manager: Kimberlee Benson RN 
 
HPI/Subjective:  
74 y/o WF admitted on 3/27 with hypertensive urgency and acute pulmonary edema. 3/29: Taken off O2 this morning. Breathing is much better. sCr up today. Patient urinating w/o difficulty. No pain. Is eager to eat and get up to chair/ambulate. ROS otherwise negative. Objective:  
 
Patient Vitals for the past 24 hrs: 
 Temp Pulse Resp BP SpO2  
19 0721 98 °F (36.7 °C) 62 19 152/68 95 % 19 0248 97.4 °F (36.3 °C) 72 22 (!) 163/96 94 % 19 2342 97.8 °F (36.6 °C) 61 18 130/59 92 % 19 1919 97.3 °F (36.3 °C) (!) 58 20 127/74 94 % 19 1643     92 % 19 1509 97.5 °F (36.4 °C) (!) 45 19 101/60 95 % 19 1112 97.7 °F (36.5 °C) (!) 56 18 126/70 97 % Oxygen Therapy O2 Sat (%): 95 % (19 0721) Pulse via Oximetry: 100 beats per minute (19 1737) O2 Device: Nasal cannula (19 1643) O2 Flow Rate (L/min): 2 l/min (19 1643) FIO2 (%): 40 % (19 1516) Intake/Output Summary (Last 24 hours) at 3/29/2019 7446 Last data filed at 3/29/2019 6298 Gross per 24 hour Intake 1391 ml Output 700 ml Net 691 ml  
   
*Note that automatically entered I/Os may not be accurate; dependent on patient compliance with collection and accurate  by [employer]. General:    Well nourished. Alert. CV:   RRR. No murmur, rub, or gallop. Lungs:   CTAB. No wheezing, rhonchi, or rales. Abdomen:   Soft, nontender, nondistended. Extremities: Warm and dry. No cyanosis or edema. Skin:     No rashes or jaundice. Neuro:  No gross focal deficits Data Review: 
I have reviewed all labs, meds, and studies from the last 24 hours: Recent Results (from the past 24 hour(s)) EKG, 12 LEAD, SUBSEQUENT Collection Time: 19  5:11 PM  
Result Value Ref Range Ventricular Rate 41 BPM  
 Atrial Rate 41 BPM  
 P-R Interval 194 ms QRS Duration 140 ms  
 Q-T Interval 524 ms QTC Calculation (Bezet) 432 ms Calculated P Axis 84 degrees Calculated R Axis 14 degrees Calculated T Axis 90 degrees Diagnosis Marked sinus bradycardia with intermitttent Non-specific intra-ventricular conduction block Possible Anterolateral infarct (cited on or before 27-MAR-2019) Abnormal ECG When compared with ECG of 27-MAR-2019 14:15, Aberrant conduction is now Present Vent. rate has decreased BY  82 BPM 
Questionable change in initial forces of Lateral leads T wave inversion more evident in Lateral leads Confirmed by JIMMY COOK (), Laci Barillas ([id number]) on 3/29/2019 5:27:78 AM 
  
METABOLIC PANEL, BASIC Collection Time: 19  4:30 AM  
Result Value Ref Range Sodium 139 136 - 145 mmol/L Potassium 3.8 3.5 - 5.1 mmol/L Chloride 105 98 - 107 mmol/L  
 CO2 26 21 - 32 mmol/L Anion gap 8 7 - 16 mmol/L Glucose 108 (H) 65 - 100 mg/dL BUN 42 (H) 8 - 23 MG/DL Creatinine 1.65 (H) 0.6 - 1.0 MG/DL  
 GFR est AA 39 (L) >60 ml/min/1.73m2 GFR est non-AA 32 (L) >60 ml/min/1.73m2 Calcium 9.4 8.3 - 10.4 MG/DL All Micro Results None Results for orders placed or performed during the hospital encounter of 19  
2D ECHO COMPLETE ADULT (TTE) W OR WO CONTR Narrative [facility] [address], [address] [facility] 
[phone] Transthoracic Echocardiogram 
2D, M-mode, Doppler, and Color Doppler Patient: Humera Forrester 
MR #: [id number] : [id number] Age: 75 years Gender: Female Study date: 28-Mar-2019 Account #: [de-identified] Height: 62 in 
Weight: 210.5 lb 
BSA: 1.96 mï¾² Status:Routine Location: [facility] BP: 167/ 78 Allergies: LISINOPRIL 
 
 Sonographer:  Abelino Harmon [facility] Group:  [address] Cardiology Referring Physician:  Lynn Tate MD 
Reading Physician:  Phoebe Cabrera MD 
 
INDICATIONS: Shortness of Breath PROCEDURE: This was a routine study. A transthoracic echocardiogram was 
performed. The study included complete 2D imaging, M-mode, complete spectral 
Doppler, and color Doppler. Intravenous contrast (Definity, 2 ml) was 
administered. Image quality was adequate. LEFT VENTRICLE: Size was normal. Systolic function was normal. Ejection 
fraction was estimated in the range of 55 % to 60 %. There were no regional 
wall motion abnormalities. Wall thickness was mildly increased. Left 
ventricular diastolic function was normal. Average E/e' of 11.15. 
 
RIGHT VENTRICLE: The size was normal. Systolic function was normal. 
 
LEFT ATRIUM: The atrium was moderately dilated. RIGHT ATRIUM: The atrium was mildly dilated. SYSTEMIC VEINS: IVC: The inferior vena cava was normal in size and course. AORTIC VALVE: The valve was structurally normal, tri-commissural. There was  
no 
evidence for stenosis. There was no insufficiency. MITRAL VALVE: Valve structure was normal. There was no evidence for stenosis. There was mild regurgitation. TRICUSPID VALVE: The valve structure was normal. There was no evidence for 
stenosis. There was mild regurgitation. PULMONIC VALVE: The valve structure was normal. There was no evidence for 
stenosis. There was trace insufficiency. PERICARDIUM: There was no pericardial effusion. AORTA: The root exhibited normal size. SUMMARY: 
 
-  Left ventricle: Systolic function was normal. Ejection fraction was 
estimated in the range of 55 % to 60 %. There were no regional wall motion 
abnormalities. Wall thickness was mildly increased. -  Left atrium: The atrium was moderately dilated. SYSTEM MEASUREMENT TABLES 
 
2D mode AoR Diam (2D): 2.9 cm 
LA Dimension (2D): 4.3 cm 
 Left Atrium Systolic Volume Index; Method of Disks, Biplane; 2D mode;: 52.6 
ml/m2 IVS/LVPW (2D): 0.9 IVSd (2D): 1.2 cm LVIDd (2D): 4.6 cm 
LVIDs (2D): 3.1 cm 
LVPWd (2D): 1.4 cm RVIDd (2D): 3 cm Unspecified Scan Mode Peak Grad; Mean; Antegrade Flow: 14 mm[Hg] Vmax; Antegrade Flow: 193 cm/s Prepared and signed by Carlee Lewis MD 
Signed 28-Mar-2019 12:07:19 Current Meds: 
Current Facility-Administered Medications Medication Dose Route Frequency  aspirin delayed-release tablet 81 mg  81 mg Oral DAILY  simvastatin (ZOCOR) tablet 10 mg  10 mg Oral QHS  triamterene-hydroCHLOROthiazide (MAXZIDE) 75-50 mg tablet 1 Tab  1 Tab Oral DAILY  sodium chloride (NS) flush 5-40 mL  5-40 mL IntraVENous Q8H  
 sodium chloride (NS) flush 5-40 mL  5-40 mL IntraVENous PRN  
 acetaminophen (TYLENOL) tablet 650 mg  650 mg Oral Q4H PRN  
 ondansetron (ZOFRAN) injection 4 mg  4 mg IntraVENous Q4H PRN  
 enoxaparin (LOVENOX) injection 40 mg  40 mg SubCUTAneous Q24H  
 morphine injection 2 mg  2 mg IntraVENous Q4H PRN Other Studies (last 24 hours): No results found. Assessment and Plan:  
 
Hospital Problems as of 3/29/2019 Date Reviewed: 2/22/2019 Codes Class Noted - Resolved POA Sinus bradycardia ICD-10-CM: R00.1 ICD-9-CM: 427.89  3/29/2019 - Present Unknown * (Principal) Pulmonary edema ICD-10-CM: J81.1 ICD-9-CM: 572  3/27/2019 - Present Yes Hypertensive urgency ICD-10-CM: I16.0 ICD-9-CM: 401.9  3/27/2019 - Present Yes  
   
 CKD (chronic kidney disease) ICD-10-CM: N18.9 ICD-9-CM: 585.9  3/27/2019 - Present Yes Essential hypertension ICD-10-CM: I10 
ICD-9-CM: 401.9  2/9/2016 - Present Yes Hyperlipidemia LDL goal <100 ICD-10-CM: E78.5 ICD-9-CM: 272.4  2/9/2016 - Present Yes Plan: # MICHELINE v CKD 
 - sCr normal yesterday, now up, but in range from her apparent baseline since about 2017. Will stop Lasix and plan to repeat tomorrow to make sure it's not progressing. PO hydration. # Sinus bradycardia - DC diltiazem and BB # Hypertensive urgency/HTN 
            - Urgency resolved. - Start PO hydralazine since AV blockers dc due to bradycardia. # Acute pulmonary edema 
            - Suspect secondary to hypertensive urgency 
             - TTE normal LVEF, no DD.  
  
# HLD 
            - statin 
  
# Sinus tachycardia 
            - resolved Addendum: Repeat PA/lateral CXR done to assess for resolution of pulmonary edema. It shows an acute large left apical PTX. Patient was taken off O2 this morning and had no respiratory complaints. Dr. Last Yuen aware. Will evaluate patient once she returns from radiology. DC planning/Dispo:  Repeat renal fxn tomorrow. CXR as above. Diet:  DIET CARDIAC 
DVT ppx:  Lovenox.  
 
Signed: 
Venkatesh Vanessa MD

## 2019-03-29 NOTE — ROUTINE PROCESS
At bedside with Dr. Ab Bawja for bedside chest tube insertion. Vital signs obtained and consent verified in chart.

## 2019-03-29 NOTE — H&P (VIEW-ONLY)
CONSULT NOTE Yari Guerrero 3/29/2019 Date of Admission:  3/27/2019 The patient's chart is reviewed and the patient is discussed with the staff. Subjective:  
 
Patient is a 75 y.o.  female seen and evaluated at the request of Dr. Zach Brush. She was admitted yesterday for shortness of breath w h/o HTN, HLP presumed to be pulmonary edema from hypertensive urgency. She reports while trying to golf on Wednesday she was coughing heavily and was short of breath. Nitro did not help. She has no known lung disease, is not a smoker and actually was improving with less O2 requirement over the past 24 hours with diuresis. She went to get a repeat CXR today and demonstrated an unexpected L large PTX. She had not any chest trauma, procedures and only reports twisting her right knee as any sort of recent injury. Review of Systems A comprehensive review of systems was negative except for that written in the HPI. Patient Active Problem List  
Diagnosis Code  Essential hypertension I10  
 Hyperlipidemia LDL goal <100 E78.5  Chest pain, unspecified R07.9  Sick sinus syndrome (HCC) I49.5  Pulmonary edema J81.1  Hypertensive urgency I16.0  CKD (chronic kidney disease) N18.9  Sinus bradycardia R00.1  Spontaneous pneumothorax J93.83 Prior to Admission Medications Prescriptions Last Dose Informant Patient Reported? Taking?  
aspirin delayed-release 81 mg tablet   Yes No  
Sig: Take  by mouth daily. biotin 2,500 mcg tab   Yes No  
Sig: Take  by mouth two (2) times a day. cholecalciferol, vitamin D3, 2,000 unit tab   Yes No  
Sig: Take  by mouth. dilTIAZem ER (CARDIZEM LA) 360 mg Tb24 tablet   No No  
Sig: TAKE ONE TABLET BY MOUTH DAILY  
fluocinolone (DERMA-SMOOTHE/FS SCALP OIL) 0.01 % oil   No No  
Si mL by Scalp route every Monday and Friday. metoprolol succinate (TOPROL-XL) 100 mg tablet   No No  
Sig: TAKE ONE TABLET BY MOUTH DAILY nitroglycerin (NITROSTAT) 0.4 mg SL tablet   No No  
Si Tab by SubLINGual route every five (5) minutes as needed for Chest Pain. Indications: ANGINA  
omeprazole (PRILOSEC) 20 mg capsule   Yes No  
Sig: Take 20 mg by mouth daily. simvastatin (ZOCOR) 40 mg tablet   No No  
Sig: TAKE ONE TABLET BY MOUTH EACH NIGHT AT BEDTIME  
triamterene-hydroCHLOROthiazide (MAXZIDE) 75-50 mg per tablet   No No  
Sig: TAKE ONE TABLET BY MOUTH DAILY Facility-Administered Medications: None Past Medical History:  
Diagnosis Date  Essential hypertension  Hyperlipidemia LDL goal <100   
 S/P colonoscopy 2017 to  Past Surgical History:  
Procedure Laterality Date  HX BREAST BIOPSY Right  HX BREAST BIOPSY Left  HX COLONOSCOPY    
 about 5 years ago/ Normal  
 HX LEDY AND BSO Social History Socioeconomic History  Marital status:  Spouse name: Not on file  Number of children: Not on file  Years of education: Not on file  Highest education level: Not on file Occupational History  Not on file Social Needs  Financial resource strain: Not on file  Food insecurity:  
  Worry: Not on file Inability: Not on file  Transportation needs:  
  Medical: Not on file Non-medical: Not on file Tobacco Use  Smoking status: Never Smoker  Smokeless tobacco: Never Used Substance and Sexual Activity  Alcohol use: Yes Alcohol/week: 2.4 oz Types: 4 Standard drinks or equivalent per week  Drug use: No  
 Sexual activity: Not on file Lifestyle  Physical activity:  
  Days per week: Not on file Minutes per session: Not on file  Stress: Not on file Relationships  Social connections:  
  Talks on phone: Not on file Gets together: Not on file Attends Church service: Not on file Active member of club or organization: Not on file Attends meetings of clubs or organizations: Not on file Relationship status: Not on file  Intimate partner violence:  
  Fear of current or ex partner: Not on file Emotionally abused: Not on file Physically abused: Not on file Forced sexual activity: Not on file Other Topics Concern  Not on file Social History Narrative  Not on file Family History Problem Relation Age of Onset  Heart Disease Mother   
     circulation issues  No Known Problems Father  Breast Cancer Neg Hx Allergies Allergen Reactions  Lisinopril Angioedema Current Facility-Administered Medications Medication Dose Route Frequency  hydrALAZINE (APRESOLINE) tablet 25 mg  25 mg Oral TID  aspirin delayed-release tablet 81 mg  81 mg Oral DAILY  simvastatin (ZOCOR) tablet 10 mg  10 mg Oral QHS  triamterene-hydroCHLOROthiazide (MAXZIDE) 75-50 mg tablet 1 Tab  1 Tab Oral DAILY  sodium chloride (NS) flush 5-40 mL  5-40 mL IntraVENous Q8H  
 sodium chloride (NS) flush 5-40 mL  5-40 mL IntraVENous PRN  
 acetaminophen (TYLENOL) tablet 650 mg  650 mg Oral Q4H PRN  
 ondansetron (ZOFRAN) injection 4 mg  4 mg IntraVENous Q4H PRN  
 enoxaparin (LOVENOX) injection 40 mg  40 mg SubCUTAneous Q24H  
 morphine injection 2 mg  2 mg IntraVENous Q4H PRN Objective:  
 
Vitals:  
 03/28/19 2342 03/29/19 7000 03/29/19 9398 03/29/19 1156 BP: 130/59 (!) 163/96 152/68 158/80 Pulse: 61 72 62 61 Resp: 18 22 19 20 Temp: 97.8 °F (36.6 °C) 97.4 °F (36.3 °C) 98 °F (36.7 °C) 98.2 °F (36.8 °C) SpO2: 92% 94% 95% 97% Weight:  211 lb 4.8 oz (95.8 kg) Height: PHYSICAL EXAM  
 
Constitutional:  the patient is well developed and in no acute distress EENMT:  Sclera clear, pupils equal, oral mucosa moist 
Respiratory: diminished on left, clear on right Cardiovascular:  RRR without M,G,R 
Gastrointestinal: soft and non-tender; with positive bowel sounds. Musculoskeletal: warm without cyanosis. There is trace lower leg edema. Skin:  no jaundice or rashes, no wounds Neurologic: no gross neuro deficits Psychiatric:  alert and oriented x 4 CXR:  New large left apical pneumothorax Recent Labs  
  03/28/19 
0507 03/27/19 
1418 WBC 9.6 12.0*  
HGB 12.9 14.5 HCT 40.4 44.4  229 Recent Labs  
  03/29/19 
0430 03/28/19 
0507 03/27/19 
1418  141 140  
K 3.8 3.4* 3.9  106 108* * 129* 194* CO2 26 27 22 BUN 42* 21 19 CREA 1.65* 0.91 1.13* CA 9.4 9.2 9.2  
TROIQ  --  0.04  --   
ALB  --   --  3.6 TBILI  --   --  0.6 ALT  --   --  25 SGOT  --   --  40* No results for input(s): PH, PCO2, PO2, HCO3 in the last 72 hours. No results for input(s): LCAD, LAC in the last 72 hours. Assessment:  (Medical Decision Making) Hospital Problems  Date Reviewed: 2/22/2019 Codes Class Noted POA Sinus bradycardia ICD-10-CM: R00.1 ICD-9-CM: 427.89  3/29/2019 Unknown Spontaneous pneumothorax ICD-10-CM: J93.83 ICD-9-CM: 512.89  3/29/2019 Unknown * (Principal) Pulmonary edema ICD-10-CM: J81.1 ICD-9-CM: 462  3/27/2019 Yes Hypertensive urgency ICD-10-CM: I16.0 ICD-9-CM: 401.9  3/27/2019 Yes CKD (chronic kidney disease) ICD-10-CM: N18.9 ICD-9-CM: 585.9  3/27/2019 Yes Essential hypertension ICD-10-CM: I10 
ICD-9-CM: 401.9  2/9/2016 Yes Hyperlipidemia LDL goal <100 ICD-10-CM: E78.5 ICD-9-CM: 272.4  2/9/2016 Yes Plan:  (Medical Decision Making) --chest tube/uresil connect to atrium 
--repeat CXR 
--CT when fully expanded by CXR to review for underlying lung disease More than 50% of the time documented was spent in face-to-face contact with the patient and in the care of the patient on the floor/unit where the patient is located. Thank you very much for this referral.  We appreciate the opportunity to participate in this patient's care. Will follow along with above stated plan.  
 
Ronit Gallegos MD

## 2019-03-29 NOTE — PROGRESS NOTES
Problem: Falls - Risk of 
Goal: *Absence of Falls Description Document Beena Rey Fall Risk and appropriate interventions in the flowsheet. Outcome: Progressing Towards Goal 
Note:  
Fall Risk Interventions: 
  
 
  
 
Medication Interventions: Evaluate medications/consider consulting pharmacy, Teach patient to arise slowly History of Falls Interventions: Evaluate medications/consider consulting pharmacy, Consult care management for discharge planning

## 2019-03-29 NOTE — PROGRESS NOTES
Patient signed consent for Left chest tube placement. Patient changed into gown and new leads placed, NSR 62. Patient denies pain or needs at present. Will continue to monitor.

## 2019-03-29 NOTE — PROGRESS NOTES
Called to assist with bedside  Chest tube insertion. Consent obtained. Time out performed. Vitals monitored throughout procedure. Pt rolled on R side for L chest tube insertion. Site cleaned in sterile fashion by MD.  15 F Uresil chest tube inserted into L thorax by MD in sterile fashion after local anesthetic injected into L thorax site. Chest tube taped in place. Attached to atrium that was set up appropriately to 20 cm H20. Air was auscultated after initial insertion but no leak noticed after. No medication given for procedure. Ultrasound done and MD reviewed findings. Xray ordered post procedure for L chest tube placement.

## 2019-03-29 NOTE — PROGRESS NOTES
Per Radiologist, Dr Francesca Goodwin; patient now has a new large left apical pneumothorax. Dr Marissa Sharp paged in regards.

## 2019-03-29 NOTE — CONSULTS
CONSULT NOTE    Kat Ball    3/29/2019    Date of Admission:  3/27/2019    The patient's chart is reviewed and the patient is discussed with the staff. Subjective:     Patient is a 75 y.o.  female seen and evaluated at the request of Dr. Tay Scott. She was admitted yesterday for shortness of breath w h/o HTN, HLP presumed to be pulmonary edema from hypertensive urgency. She reports while trying to golf on Wednesday she was coughing heavily and was short of breath. Nitro did not help. She has no known lung disease, is not a smoker and actually was improving with less O2 requirement over the past 24 hours with diuresis. She went to get a repeat CXR today and demonstrated an unexpected L large PTX. She had not any chest trauma, procedures and only reports twisting her right knee as any sort of recent injury. Review of Systems  A comprehensive review of systems was negative except for that written in the HPI. Patient Active Problem List   Diagnosis Code    Essential hypertension I10    Hyperlipidemia LDL goal <100 E78.5    Chest pain, unspecified R07.9    Sick sinus syndrome (HCC) I49.5    Pulmonary edema J81.1    Hypertensive urgency I16.0    CKD (chronic kidney disease) N18.9    Sinus bradycardia R00.1    Spontaneous pneumothorax J93.83     Prior to Admission Medications   Prescriptions Last Dose Informant Patient Reported? Taking?   aspirin delayed-release 81 mg tablet   Yes No   Sig: Take  by mouth daily. biotin 2,500 mcg tab   Yes No   Sig: Take  by mouth two (2) times a day. cholecalciferol, vitamin D3, 2,000 unit tab   Yes No   Sig: Take  by mouth. dilTIAZem ER (CARDIZEM LA) 360 mg Tb24 tablet   No No   Sig: TAKE ONE TABLET BY MOUTH DAILY   fluocinolone (DERMA-SMOOTHE/FS SCALP OIL) 0.01 % oil   No No   Si mL by Scalp route every Monday and Friday.    metoprolol succinate (TOPROL-XL) 100 mg tablet   No No   Sig: TAKE ONE TABLET BY MOUTH DAILY nitroglycerin (NITROSTAT) 0.4 mg SL tablet   No No   Si Tab by SubLINGual route every five (5) minutes as needed for Chest Pain. Indications: ANGINA   omeprazole (PRILOSEC) 20 mg capsule   Yes No   Sig: Take 20 mg by mouth daily. simvastatin (ZOCOR) 40 mg tablet   No No   Sig: TAKE ONE TABLET BY MOUTH EACH NIGHT AT BEDTIME   triamterene-hydroCHLOROthiazide (MAXZIDE) 75-50 mg per tablet   No No   Sig: TAKE ONE TABLET BY MOUTH DAILY      Facility-Administered Medications: None     Past Medical History:   Diagnosis Date    Essential hypertension     Hyperlipidemia LDL goal <100     S/P colonoscopy 2017 to      Past Surgical History:   Procedure Laterality Date    HX BREAST BIOPSY Right     HX BREAST BIOPSY Left     HX COLONOSCOPY      about 5 years ago/ Normal    HX LEDY AND BSO       Social History     Socioeconomic History    Marital status:      Spouse name: Not on file    Number of children: Not on file    Years of education: Not on file    Highest education level: Not on file   Occupational History    Not on file   Social Needs    Financial resource strain: Not on file    Food insecurity:     Worry: Not on file     Inability: Not on file    Transportation needs:     Medical: Not on file     Non-medical: Not on file   Tobacco Use    Smoking status: Never Smoker    Smokeless tobacco: Never Used   Substance and Sexual Activity    Alcohol use:  Yes     Alcohol/week: 2.4 oz     Types: 4 Standard drinks or equivalent per week    Drug use: No    Sexual activity: Not on file   Lifestyle    Physical activity:     Days per week: Not on file     Minutes per session: Not on file    Stress: Not on file   Relationships    Social connections:     Talks on phone: Not on file     Gets together: Not on file     Attends [employer] service: Not on file     Active member of club or organization: Not on file     Attends meetings of clubs or organizations: Not on file     Relationship status: Not on file    Intimate partner violence:     Fear of current or ex partner: Not on file     Emotionally abused: Not on file     Physically abused: Not on file     Forced sexual activity: Not on file   Other Topics Concern    Not on file   Social History Narrative    Not on file     Family History   Problem Relation Age of Onset    Heart Disease Mother         circulation issues    No Known Problems Father     Breast Cancer Neg Hx      Allergies   Allergen Reactions    Lisinopril Angioedema     Current Facility-Administered Medications   Medication Dose Route Frequency    hydrALAZINE (APRESOLINE) tablet 25 mg  25 mg Oral TID    aspirin delayed-release tablet 81 mg  81 mg Oral DAILY    simvastatin (ZOCOR) tablet 10 mg  10 mg Oral QHS    triamterene-hydroCHLOROthiazide (MAXZIDE) 75-50 mg tablet 1 Tab  1 Tab Oral DAILY    sodium chloride (NS) flush 5-40 mL  5-40 mL IntraVENous Q8H    sodium chloride (NS) flush 5-40 mL  5-40 mL IntraVENous PRN    acetaminophen (TYLENOL) tablet 650 mg  650 mg Oral Q4H PRN    ondansetron (ZOFRAN) injection 4 mg  4 mg IntraVENous Q4H PRN    enoxaparin (LOVENOX) injection 40 mg  40 mg SubCUTAneous Q24H    morphine injection 2 mg  2 mg IntraVENous Q4H PRN     Objective:     Vitals:    03/28/19 2342 03/29/19 0248 03/29/19 0721 03/29/19 1156   BP: 130/59 (!) 163/96 152/68 158/80   Pulse: 61 72 62 61   Resp: 18 22 19 20   Temp: 97.8 °F (36.6 °C) 97.4 °F (36.3 °C) 98 °F (36.7 °C) 98.2 °F (36.8 °C)   SpO2: 92% 94% 95% 97%   Weight:  211 lb 4.8 oz (95.8 kg)     Height:         PHYSICAL EXAM     Constitutional:  the patient is well developed and in no acute distress  EENMT:  Sclera clear, pupils equal, oral mucosa moist  Respiratory: diminished on left, clear on right  Cardiovascular:  RRR without M,G,R  Gastrointestinal: soft and non-tender; with positive bowel sounds. Musculoskeletal: warm without cyanosis. There is trace lower leg edema.   Skin:  no jaundice or rashes, no wounds   Neurologic: no gross neuro deficits     Psychiatric:  alert and oriented x 4    CXR:  New large left apical pneumothorax      Recent Labs     03/28/19  0507 03/27/19  1418   WBC 9.6 12.0*   HGB 12.9 14.5   HCT 40.4 44.4    229     Recent Labs     03/29/19  0430 03/28/19  0507 03/27/19  1418    141 140   K 3.8 3.4* 3.9    106 108*   * 129* 194*   CO2 26 27 22   BUN 42* 21 19   CREA 1.65* 0.91 1.13*   CA 9.4 9.2 9.2   TROIQ  --  0.04  --    ALB  --   --  3.6   TBILI  --   --  0.6   ALT  --   --  25   SGOT  --   --  40*     No results for input(s): PH, PCO2, PO2, HCO3 in the last 72 hours. No results for input(s): LCAD, LAC in the last 72 hours. Assessment:  (Medical Decision Making)     Hospital Problems  Date Reviewed: 2/22/2019          Codes Class Noted POA    Sinus bradycardia ICD-10-CM: R00.1  ICD-9-CM: 427.89  3/29/2019 Unknown        Spontaneous pneumothorax ICD-10-CM: J93.83  ICD-9-CM: 512.89  3/29/2019 Unknown        * (Principal) Pulmonary edema ICD-10-CM: J81.1  ICD-9-CM: 051  3/27/2019 Yes        Hypertensive urgency ICD-10-CM: I16.0  ICD-9-CM: 401.9  3/27/2019 Yes        CKD (chronic kidney disease) ICD-10-CM: N18.9  ICD-9-CM: 585.9  3/27/2019 Yes        Essential hypertension ICD-10-CM: I10  ICD-9-CM: 401.9  2/9/2016 Yes        Hyperlipidemia LDL goal <100 ICD-10-CM: E78.5  ICD-9-CM: 272.4  2/9/2016 Yes              Plan:  (Medical Decision Making)     --chest tube/uresil connect to [facility]  --repeat CXR  --CT when fully expanded by CXR to review for underlying lung disease    More than 50% of the time documented was spent in face-to-face contact with the patient and in the care of the patient on the floor/unit where the patient is located. Thank you very much for this referral.  We appreciate the opportunity to participate in this patient's care. Will follow along with above stated plan.     Balwinder Samuels MD

## 2019-03-29 NOTE — PROCEDURES
PROCEDURE:     13F Ure-Niyah device placement  CPT Code 26811    INDICATION:    LEFT PNEUMOTHORAX    SEDATION:  None    DESCRIPTION:    After obtaining informed consent from the patient, the left chest was prepped with chlorhexidine in the usual fashion. The third intercostal space was then localized and 1% lidocaine was injected over the underlying rib (5 cc total) anesthetizing the skin, subcutaneous tissue, rib, and intercostal muscles. The depth of the pleural space was then noted by aspirating air into the syringe. The provided scalpel was used to make a 3-mm incision in the needle insertion area. After assuring adequate hemostasis, the 13 F Ure-Niyah device was introduced into the chest using the provided trocar in the kit in the usual fashion. The adhesive type protector was then removed and the hole device affixed to the patient's chest.  The valve within the device was seen moving up and down with respiration. A one-way valve provided in the kit was then attached and air was removed with a 60 cc syringe to a total of 9 cc. The suction port was then closed with the provided cap. There were no complications, the patient tolerated the procedure well. A post procedure CXR has been ordered and is pending. RECOMMENDATION:    CXR now. CT if completely expanded looking for underlying lung disease.    CXR in Lyndsey Harper MD

## 2019-03-29 NOTE — PROGRESS NOTES
Assumed care of patient. Assessment completed and documented, see docflow. Patient A/Ox3, resting quietly in bed. NAD noted at present. Respirations even and non labored on 2LNC. Verbalized understanding to call for needs. Call light within reach. Will continue to monitor.

## 2019-03-30 ENCOUNTER — APPOINTMENT (OUTPATIENT)
Dept: GENERAL RADIOLOGY | Age: 74
DRG: 189 | End: 2019-03-30
Attending: INTERNAL MEDICINE
Payer: MEDICARE

## 2019-03-30 LAB
ANION GAP SERPL CALC-SCNC: 7 MMOL/L (ref 7–16)
BUN SERPL-MCNC: 37 MG/DL (ref 8–23)
CALCIUM SERPL-MCNC: 9.5 MG/DL (ref 8.3–10.4)
CHLORIDE SERPL-SCNC: 103 MMOL/L (ref 98–107)
CO2 SERPL-SCNC: 28 MMOL/L (ref 21–32)
CREAT SERPL-MCNC: 1.21 MG/DL (ref 0.6–1)
GLUCOSE SERPL-MCNC: 90 MG/DL (ref 65–100)
POTASSIUM SERPL-SCNC: 3.8 MMOL/L (ref 3.5–5.1)
SODIUM SERPL-SCNC: 138 MMOL/L (ref 136–145)

## 2019-03-30 PROCEDURE — 65660000000 HC RM CCU STEPDOWN

## 2019-03-30 PROCEDURE — 94760 N-INVAS EAR/PLS OXIMETRY 1: CPT

## 2019-03-30 PROCEDURE — 74011250636 HC RX REV CODE- 250/636: Performed by: INTERNAL MEDICINE

## 2019-03-30 PROCEDURE — 74011250637 HC RX REV CODE- 250/637: Performed by: INTERNAL MEDICINE

## 2019-03-30 PROCEDURE — 80048 BASIC METABOLIC PNL TOTAL CA: CPT

## 2019-03-30 PROCEDURE — 71045 X-RAY EXAM CHEST 1 VIEW: CPT

## 2019-03-30 PROCEDURE — 99232 SBSQ HOSP IP/OBS MODERATE 35: CPT | Performed by: INTERNAL MEDICINE

## 2019-03-30 PROCEDURE — 36415 COLL VENOUS BLD VENIPUNCTURE: CPT

## 2019-03-30 RX ORDER — HYDRALAZINE HYDROCHLORIDE 50 MG/1
50 TABLET, FILM COATED ORAL 3 TIMES DAILY
Status: DISCONTINUED | OUTPATIENT
Start: 2019-03-30 | End: 2019-04-01 | Stop reason: HOSPADM

## 2019-03-30 RX ADMIN — ASPIRIN 81 MG: 81 TABLET, COATED ORAL at 08:34

## 2019-03-30 RX ADMIN — HYDRALAZINE HYDROCHLORIDE 50 MG: 50 TABLET, FILM COATED ORAL at 17:21

## 2019-03-30 RX ADMIN — Medication 10 ML: at 21:01

## 2019-03-30 RX ADMIN — Medication 5 ML: at 15:33

## 2019-03-30 RX ADMIN — TRIAMTERENE AND HYDROCHLOROTHIAZIDE 1 TABLET: 75; 50 TABLET ORAL at 08:34

## 2019-03-30 RX ADMIN — HYDRALAZINE HYDROCHLORIDE 25 MG: 25 TABLET, FILM COATED ORAL at 08:34

## 2019-03-30 RX ADMIN — Medication 10 ML: at 05:49

## 2019-03-30 RX ADMIN — MORPHINE SULFATE 2 MG: 2 INJECTION, SOLUTION INTRAMUSCULAR; INTRAVENOUS at 20:53

## 2019-03-30 RX ADMIN — MORPHINE SULFATE 2 MG: 2 INJECTION, SOLUTION INTRAMUSCULAR; INTRAVENOUS at 01:59

## 2019-03-30 RX ADMIN — ENOXAPARIN SODIUM 40 MG: 40 INJECTION SUBCUTANEOUS at 20:53

## 2019-03-30 RX ADMIN — SIMVASTATIN 10 MG: 10 TABLET, FILM COATED ORAL at 21:00

## 2019-03-30 RX ADMIN — HYDRALAZINE HYDROCHLORIDE 50 MG: 50 TABLET, FILM COATED ORAL at 21:00

## 2019-03-30 NOTE — PROGRESS NOTES
No order for pt to be connected to suction despite patient's chest tube being hooked to suction. Spoke to on call Pulmonary doctor and will hook pt's chest tube up to high suction per MD telephone order.

## 2019-03-30 NOTE — PROGRESS NOTES
Karin Aguilar Admission Date: 3/27/2019 Daily Progress Note: 3/30/2019 The patient's chart is reviewed and the patient is discussed with the staff. Patient is a 75 y.o.  female seen and evaluated at the request of Dr. Leroy Lucas. She was admitted yesterday for shortness of breath w h/o HTN, HLP presumed to be pulmonary edema from hypertensive urgency. She reports while trying to golf on Wednesday she was coughing heavily and was short of breath. Nitro did not help. She has no known lung disease, is not a smoker and actually was improving with less O2 requirement over the past 24 hours with diuresis. She went to get a repeat CXR today and demonstrated an unexpected L large PTX. She had not any chest trauma, procedures and only reports twisting her right knee as any sort of recent injury. Subjective:  
 
Uresil placed yesterday afternoon, CT chest performed last night. CXR this AM without PTX. Less chest pain, no longer coughing. Current Facility-Administered Medications Medication Dose Route Frequency  hydrALAZINE (APRESOLINE) tablet 25 mg  25 mg Oral TID  traMADol (ULTRAM) tablet 50 mg  50 mg Oral Q6H PRN  
 aspirin delayed-release tablet 81 mg  81 mg Oral DAILY  simvastatin (ZOCOR) tablet 10 mg  10 mg Oral QHS  triamterene-hydroCHLOROthiazide (MAXZIDE) 75-50 mg tablet 1 Tab  1 Tab Oral DAILY  sodium chloride (NS) flush 5-40 mL  5-40 mL IntraVENous Q8H  
 sodium chloride (NS) flush 5-40 mL  5-40 mL IntraVENous PRN  
 acetaminophen (TYLENOL) tablet 650 mg  650 mg Oral Q4H PRN  
 ondansetron (ZOFRAN) injection 4 mg  4 mg IntraVENous Q4H PRN  
 enoxaparin (LOVENOX) injection 40 mg  40 mg SubCUTAneous Q24H  
 morphine injection 2 mg  2 mg IntraVENous Q4H PRN Review of Systems Constitutional: negative for fever, chills, sweats Cardiovascular: negative for chest pain, palpitations, syncope, edema Gastrointestinal:  negative for dysphagia, reflux, vomiting, diarrhea, abdominal pain, or melena Neurologic:  negative for focal weakness, numbness, headache Objective:  
 
Vitals:  
 03/29/19 2150 03/29/19 2246 03/30/19 2971 03/30/19 7670 BP: 153/73 149/80 138/82 164/83 Pulse: 65 69 67 70 Resp:  20 18 20 Temp:  97.3 °F (36.3 °C) 97.9 °F (36.6 °C) 98 °F (36.7 °C) SpO2:  99% 98% 98% Weight:   196 lb 14.4 oz (89.3 kg) Height:      
 
Intake and Output:  
03/28 1901 - 03/30 0700 In: 2115 [P.O.:2115] Out: 2600 [Urine:2600] No intake/output data recorded. Physical Exam:  
Constitution:  the patient is well developed and in no acute distress EENMT:  Sclera clear, pupils equal, oral mucosa moist 
Respiratory: clear bilaterally, L sided uresil in place, no air leak even with cough Cardiovascular:  RRR without M,G,R 
Gastrointestinal: soft and non-tender; with positive bowel sounds. Musculoskeletal: warm without cyanosis. There is no lower leg edema. Skin:  no jaundice or rashes, no wounds Neurologic: no gross neuro deficits Psychiatric:  alert and oriented x 4 CXR: No PTX 
 
 
CT Chest: small areas of atelectasis, no cystic lung disease or ILD. LAB No results for input(s): GLUCPOC in the last 72 hours. No lab exists for component: [place] Recent Labs  
  03/28/19 
0507 03/27/19 
1418 WBC 9.6 12.0*  
HGB 12.9 14.5 HCT 40.4 44.4  229 Recent Labs  
  03/30/19 
0406 03/29/19 
0430 03/28/19 
0507 03/27/19 
1418  139 141 140  
K 3.8 3.8 3.4* 3.9  105 106 108* CO2 28 26 27 22 GLU 90 108* 129* 194* BUN 37* 42* 21 19 CREA 1.21* 1.65* 0.91 1.13* CA 9.5 9.4 9.2 9.2  
TROIQ  --   --  0.04  --   
ALB  --   --   --  3.6 TBILI  --   --   --  0.6 ALT  --   --   --  25 SGOT  --   --   --  40* Recent Labs  
  03/27/19 
1513 PHI 7.314* PCO2I 47.1*  
PO2I 93 HCO3I 23.9 No results for input(s): LCAD, LAC in the last 72 hours. Assessment:  (Medical Decision Making) Hospital Problems  Date Reviewed: 2/22/2019 Codes Class Noted POA Sinus bradycardia ICD-10-CM: R00.1 ICD-9-CM: 427.89  3/29/2019 Unknown Spontaneous pneumothorax ICD-10-CM: J93.83 ICD-9-CM: 512.89  3/29/2019 Unknown * (Principal) Pulmonary edema ICD-10-CM: J81.1 ICD-9-CM: 267  3/27/2019 Yes Hypertensive urgency ICD-10-CM: I16.0 ICD-9-CM: 401.9  3/27/2019 Yes CKD (chronic kidney disease) ICD-10-CM: N18.9 ICD-9-CM: 585.9  3/27/2019 Yes Essential hypertension ICD-10-CM: I10 
ICD-9-CM: 401.9  2/9/2016 Yes Hyperlipidemia LDL goal <100 ICD-10-CM: E78.5 ICD-9-CM: 272.4  2/9/2016 Yes Plan:  (Medical Decision Making) --chest tube clamped 
--CXR at 1300, sooner if shortness of breath worsens 
--If in distress, unclamp at uresil box  
--if no PTX on follow up CXR will consider removal of uresil --probably plan to keep overnight and repeat CXR in AM either way 
--diuresing well (1L negative), renal function improved More than 50% of the time documented was spent in face-to-face contact with the patient and in the care of the patient on the floor/unit where the patient is located.  
 
Pepper Gagnon MD

## 2019-03-30 NOTE — PROGRESS NOTES
Hospitalist Progress Note Admit Date:  3/27/2019  2:09 PM  
Name:  Radha Tran Age:  75 y.o. 
:  1945 MRN:  [id number] PCP:  Arabella Macdonald MD 
Treatment Team: Attending Provider: Saida Richmond MD; Utilization Review: Reymundo Wall, RANDELL; Care Manager: Buddie Gowers, RANDELL; Consulting Provider: Dannie Olvera MD 
 
HPI/Subjective:  
74 y/o WF admitted on 3/27 with hypertensive urgency and acute pulmonary edema. Repeat CXR to assess edema on 3/29 showed acute large left apical PTX and chest tube was inserted. 3/30: Chest tube placed yesterday. F/u CXRs are w/o PTX. CT chest w atelectasis and small residual left PTX. Denies chest pain or SOB. Tube now clamped this morning. Overalls he fells well. Cr better. ROS otherwise negative. Objective:  
 
Patient Vitals for the past 24 hrs: 
 Temp Pulse Resp BP SpO2  
19 0738 98 °F (36.7 °C) 70 20 164/83 98 % 19 0305 97.9 °F (36.6 °C) 67 18 138/82 98 % 19 2246 97.3 °F (36.3 °C) 69 20 149/80 99 % 19 2150  65  153/73   
19 1923 98 °F (36.7 °C) 70 22 145/78 99 % 19 1500 98.1 °F (36.7 °C) 60 19 142/82 95 % 19 1415  (!) 58 20 143/83 100 % 19 1354  62 20 150/82   
19 1156 98.2 °F (36.8 °C) 61 20 158/80 97 % Oxygen Therapy O2 Sat (%): 98 % (19 0738) Pulse via Oximetry: 100 beats per minute (19 1737) O2 Device: Nasal cannula (19 1354) O2 Flow Rate (L/min): 2 l/min (19 1354) FIO2 (%): 40 % (19 1516) Intake/Output Summary (Last 24 hours) at 3/30/2019 9089 Last data filed at 3/30/2019 0900 Gross per 24 hour Intake 1300 ml Output 1900 ml Net -600 ml *Note that automatically entered I/Os may not be accurate; dependent on patient compliance with collection and accurate  by techs. General:    Well nourished. Alert. CV:   RRR. No murmur, rub, or gallop. Lungs:   CTAB. No wheezing, rhonchi, or rales. Chest tube to left upper chest, clamped. Abdomen:   Soft, nontender, nondistended. Extremities: Warm and dry. No cyanosis or edema. Skin:     No rashes or jaundice. Neuro:  No gross focal deficits Data Review: 
I have reviewed all labs, meds, and studies from the last 24 hours: 
 
Recent Results (from the past 24 hour(s)) METABOLIC PANEL, BASIC Collection Time: 19  4:06 AM  
Result Value Ref Range Sodium 138 136 - 145 mmol/L Potassium 3.8 3.5 - 5.1 mmol/L Chloride 103 98 - 107 mmol/L  
 CO2 28 21 - 32 mmol/L Anion gap 7 7 - 16 mmol/L Glucose 90 65 - 100 mg/dL BUN 37 (H) 8 - 23 MG/DL Creatinine 1.21 (H) 0.6 - 1.0 MG/DL  
 GFR est AA 56 (L) >60 ml/min/1.73m2 GFR est non-AA 46 (L) >60 ml/min/1.73m2 Calcium 9.5 8.3 - 10.4 MG/DL All Micro Results None Results for orders placed or performed during the hospital encounter of 19  
2D ECHO COMPLETE ADULT (TTE) W OR WO CONTR Narrative [address], [address] [facility] 
[phone] Transthoracic Echocardiogram 
2D, M-mode, Doppler, and Color Doppler Patient: Ninfa Menendez 
MR #: [id number] : [id number] Age: 75 years Gender: Female Study date: 28-Mar-2019 Account #: [de-identified] Height: 62 in 
Weight: 210.5 lb 
BSA: 1.96 mï¾² Status:Routine Location: [facility] BP: 167/ 78 Allergies: LISINOPRIL Sonographer:  William Laguna RDCS Group:  [facility] Referring Physician:  Charley Mittal MD 
Reading Physician:  Grady Spann MD 
 
INDICATIONS: Shortness of Breath PROCEDURE: This was a routine study. A transthoracic echocardiogram was 
performed. The study included complete 2D imaging, M-mode, complete spectral 
Doppler, and color Doppler. Intravenous contrast (Definity, 2 ml) was 
administered. Image quality was adequate. LEFT VENTRICLE: Size was normal. Systolic function was normal. Ejection 
fraction was estimated in the range of 55 % to 60 %. There were no regional 
wall motion abnormalities. Wall thickness was mildly increased. Left 
ventricular diastolic function was normal. Average E/e' of 11.15. 
 
RIGHT VENTRICLE: The size was normal. Systolic function was normal. 
 
LEFT ATRIUM: The atrium was moderately dilated. RIGHT ATRIUM: The atrium was mildly dilated. SYSTEMIC VEINS: IVC: The inferior vena cava was normal in size and course. AORTIC VALVE: The valve was structurally normal, tri-commissural. There was  
no 
evidence for stenosis. There was no insufficiency. MITRAL VALVE: Valve structure was normal. There was no evidence for stenosis. There was mild regurgitation. TRICUSPID VALVE: The valve structure was normal. There was no evidence for 
stenosis. There was mild regurgitation. PULMONIC VALVE: The valve structure was normal. There was no evidence for 
stenosis. There was trace insufficiency. PERICARDIUM: There was no pericardial effusion. AORTA: The root exhibited normal size. SUMMARY: 
 
-  Left ventricle: Systolic function was normal. Ejection fraction was 
estimated in the range of 55 % to 60 %. There were no regional wall motion 
abnormalities. Wall thickness was mildly increased. -  Left atrium: The atrium was moderately dilated. SYSTEM MEASUREMENT TABLES 
 
2D mode AoR Diam (2D): 2.9 cm 
LA Dimension (2D): 4.3 cm Left Atrium Systolic Volume Index; Method of Disks, Biplane; 2D mode;: 52.6 
ml/m2 IVS/LVPW (2D): 0.9 IVSd (2D): 1.2 cm LVIDd (2D): 4.6 cm 
LVIDs (2D): 3.1 cm 
LVPWd (2D): 1.4 cm RVIDd (2D): 3 cm Unspecified Scan Mode Peak Grad; Mean; Antegrade Flow: 14 mm[Hg] Vmax; Antegrade Flow: 193 cm/s Prepared and signed by Jacinta Pallas, MD 
Signed 28-Mar-2019 12:07:19 Current Meds: 
Current Facility-Administered Medications Medication Dose Route Frequency  hydrALAZINE (APRESOLINE) tablet 25 mg  25 mg Oral TID  traMADol (ULTRAM) tablet 50 mg  50 mg Oral Q6H PRN  
 aspirin delayed-release tablet 81 mg  81 mg Oral DAILY  simvastatin (ZOCOR) tablet 10 mg  10 mg Oral QHS  triamterene-hydroCHLOROthiazide (MAXZIDE) 75-50 mg tablet 1 Tab  1 Tab Oral DAILY  sodium chloride (NS) flush 5-40 mL  5-40 mL IntraVENous Q8H  
 sodium chloride (NS) flush 5-40 mL  5-40 mL IntraVENous PRN  
 acetaminophen (TYLENOL) tablet 650 mg  650 mg Oral Q4H PRN  
 ondansetron (ZOFRAN) injection 4 mg  4 mg IntraVENous Q4H PRN  
 enoxaparin (LOVENOX) injection 40 mg  40 mg SubCUTAneous Q24H  
 morphine injection 2 mg  2 mg IntraVENous Q4H PRN Other Studies (last 24 hours): Xr Chest Pa Lat Result Date: 3/29/2019 PA and lateral chest radiographs History: pulm edema f/u, 73 years Female Comparison: Chest radiograph March 27, 2019 Findings:  Normal cardiomediastinal silhouette. There is a new large left apical pneumothorax. Again visualized are surgical chain sutures of the left upper lobe. Otherwise there appears to have been interval improvement of the previous pulmonary edema. Persistent trace left pleural effusion. Minimal dependent subsegmental atelectasis bilateral lung bases. Visualized soft tissue and osseous structures otherwise unremarkable. Impression:  1. Acute large left apical pneumothorax. 2.  Interval improvement in pulmonary edema. [facility] The critical results contained in this report were communicated to the patient's floor nurse Elaine by Dr. Anthony Ortez on March 29, 2019 at 11:22 AM at the time of discovery and reporting of these findings. Critical results were communicated as outlined in Section II.C.2.a.i of the ACR Practice Guideline for Communication of Diagnostic Imaging Findings. Ct Chest Wo Cont Result Date: 3/29/2019 CT OF THE CHEST WITHOUT CONTRAST HISTORY: Left pneumothorax COMPARISON: Chest x-ray dated 3/81/2655 TECHNIQUE: A helical acquisition was performed through the chest utilizing 1.4ID slice thickness without intravenous contrast. Coronal and sagittal reformats were obtained. Dose reduction techniques used: Automated exposure control, adjustment of the mAs and/or kVp according to patient's size, and iterative reconstruction techniques. FINDINGS: *  PLEURA/PERICARDIUM: Small residual left pneumothorax after placement of the pleural catheter. *  LUNGS: Linear atelectasis of the left upper lobe and both lower lobes. *  DARYA/MEDIASTINUM: Within normal limits. *  TRACHEOBRONCHIAL TREE: Within normal limits. *  AORTA/PULMONARY VESSELS: Within normal limits. *  CORONARY ARTERIES: No coronary artery calcification is seen. *  CHEST WALL/AXILLA: Within normal limits. *  VISUALIZED UPPER ABDOMEN: Within normal limits. *  SPINE / BONES: Within normal limits. *  ADDITIONAL COMMENTS: None. IMPRESSION: Small residual left pneumothorax. Bilateral linear atelectasis. Date of Dictation: 3/29/2019 9:23 PM  
 
Xr Chest [facility] Result Date: 3/30/2019 EXAM: XR CHEST PORT INDICATION: Pneumothorax COMPARISON: 3/29/2019 FINDINGS: A portable AP radiograph of the chest was obtained at 0427 hours. No evidence of pneumothorax and left pleural catheter in place. Left basilar airspace disease unchanged. . The cardiac and mediastinal contours and pulmonary vascularity are normal.  The bones and soft tissues are grossly within normal limits. IMPRESSION: No evidence of pneumothorax. Left lower lobe atelectasis or pneumonia unchanged. Xr Chest [facility] Result Date: 3/29/2019 Portable Chest  X-ray  Indication: Pneumothorax follow-up, left chest tube insertion Comparison:  Exam earlier today 1005 hours Findings:  Portable AP view demonstrates small caliber chest tube in place at the peripheral margin left lung apex. Exam negative pneumothorax. Right lung well expanding clear. Impression:  Negative for pneumothorax left chest tube in place. Jerica Shultz Assessment and Plan:  
 
Hospital Problems as of 3/30/2019 Date Reviewed: 2/22/2019 Codes Class Noted - Resolved POA Sinus bradycardia ICD-10-CM: R00.1 ICD-9-CM: 427.89  3/29/2019 - Present Unknown Spontaneous pneumothorax ICD-10-CM: J93.83 ICD-9-CM: 512.89  3/29/2019 - Present Unknown * (Principal) Pulmonary edema ICD-10-CM: J81.1 ICD-9-CM: 899  3/27/2019 - Present Yes Hypertensive urgency ICD-10-CM: I16.0 ICD-9-CM: 401.9  3/27/2019 - Present Yes  
   
 CKD (chronic kidney disease) ICD-10-CM: N18.9 ICD-9-CM: 585.9  3/27/2019 - Present Yes Essential hypertension ICD-10-CM: I10 
ICD-9-CM: 401.9  2/9/2016 - Present Yes Hyperlipidemia LDL goal <100 ICD-10-CM: E78.5 ICD-9-CM: 272.4  2/9/2016 - Present Yes Plan: # Left apical PTX 
 - S/p chest tube placement 3/29 
 - CT chest reviewed; f/u CXR w/o PTX. Repeat planned for 3/30 at 1pm and tomorrow AM. 
 - Appreciate pulm help # MICHELINE v CKD - Better today. She's at baseline.  
  
# Sinus bradycardia - DC diltiazem and BB 
 - Needs to f/u with cardiologist at discharge 
  
# Hypertensive urgency/HTN 
            - QMTQDAQ resolved. - Start PO hydralazine since AV blockers dc due to bradycardia. 
  
# Acute pulmonary edema 
            - Suspect secondary to hypertensive urgency              - TTE normal LVEF, no DD.  
  
# HLD 
            - statin 
  
# Sinus tachycardia 
            - resolved DC planning/Dispo: Hopefully home tomorrow pending chest tube removal. 
Diet:  DIET CARDIAC 
DVT ppx:  Lovenox Signed: 
Shakila eCrvantes MD

## 2019-03-30 NOTE — PROGRESS NOTES
03/29/19 1955 Pain 1 Pain Scale 1 Numeric (0 - 10) Pain Intensity 1 0 Patient Stated Pain Goal 0 Pain Reassessment 1 Yes Morphine 2 mg IV effective

## 2019-03-30 NOTE — PROGRESS NOTES
Pt resting quietly in bed. Pt awakens easily and has no complaints at this time. Uresil in placed to suction per MD order. No drainage noted. Pt instructed to call for assistance

## 2019-03-30 NOTE — PROGRESS NOTES
Assumed care of patient. Assessment completed and documented, see docflow. Patient A/Ox3, resting quietly in bed. NAD noted at present. Respirations even and non labored on 2LNC. Left CT Uresil intact, set to suction. Remote tele, NSR 73. Encouraged to call for needs. Call light within reach. Will continue to monitor.

## 2019-03-30 NOTE — PROGRESS NOTES
End of shift report PM handoff RN: Behzad Morris Pain:  No c/o pain during shift Neuro: A/Ox3. No c/o numbness or tingling Cardio:  S1/S2  WNL for specified parameters. Resp:  RA clear bilaterally. Symmetric chest wall excursion. GI/:  Voiding. Urine is yellow, clear. Last BM [id number] Diet: Cardiac regular diet. Tolerating well. Ambulation:  Patient ambulates independently. No falls during shift. Linen change: [id number] Additional info: Continue POC 
 
EOS handoff RN: Behzad Morris

## 2019-03-30 NOTE — PROGRESS NOTES
Pt c/o pain 10/10 in right side of her back. Prn morphine 2 mg iv given at this time. Will continue to monitor patient

## 2019-03-30 NOTE — PROGRESS NOTES
03/30/19 0215 Pain 1 Pain Scale 1 Numeric (0 - 10) Pain Intensity 1 0 Patient Stated Pain Goal 0 Pain Reassessment 1 Yes Morphine effective. Pt reports no pain at this time

## 2019-03-30 NOTE — PROGRESS NOTES
Problem: Falls - Risk of 
Goal: *Absence of Falls Description Document [facility] Fall Risk and appropriate interventions in the flowsheet. Outcome: Progressing Towards Goal 
Note:  
Fall Risk Interventions: 
Mobility Interventions: Patient to call before getting OOB Medication Interventions: Patient to call before getting OOB Elimination Interventions: Call light in reach, Patient to call for help with toileting needs History of Falls Interventions: Door open when patient unattended, Consult care management for discharge planning, Evaluate medications/consider consulting pharmacy

## 2019-03-31 ENCOUNTER — APPOINTMENT (OUTPATIENT)
Dept: GENERAL RADIOLOGY | Age: 74
DRG: 189 | End: 2019-03-31
Attending: INTERNAL MEDICINE
Payer: MEDICARE

## 2019-03-31 PROCEDURE — 74011250637 HC RX REV CODE- 250/637: Performed by: INTERNAL MEDICINE

## 2019-03-31 PROCEDURE — 71045 X-RAY EXAM CHEST 1 VIEW: CPT

## 2019-03-31 PROCEDURE — 65660000000 HC RM CCU STEPDOWN

## 2019-03-31 PROCEDURE — 74011250636 HC RX REV CODE- 250/636: Performed by: INTERNAL MEDICINE

## 2019-03-31 PROCEDURE — 99232 SBSQ HOSP IP/OBS MODERATE 35: CPT | Performed by: INTERNAL MEDICINE

## 2019-03-31 RX ADMIN — ASPIRIN 81 MG: 81 TABLET, COATED ORAL at 08:40

## 2019-03-31 RX ADMIN — ENOXAPARIN SODIUM 40 MG: 40 INJECTION SUBCUTANEOUS at 20:19

## 2019-03-31 RX ADMIN — Medication 10 ML: at 21:43

## 2019-03-31 RX ADMIN — Medication 5 ML: at 08:41

## 2019-03-31 RX ADMIN — HYDRALAZINE HYDROCHLORIDE 50 MG: 50 TABLET, FILM COATED ORAL at 16:39

## 2019-03-31 RX ADMIN — HYDRALAZINE HYDROCHLORIDE 50 MG: 50 TABLET, FILM COATED ORAL at 08:40

## 2019-03-31 RX ADMIN — Medication 10 ML: at 05:34

## 2019-03-31 RX ADMIN — TRIAMTERENE AND HYDROCHLOROTHIAZIDE 1 TABLET: 75; 50 TABLET ORAL at 08:40

## 2019-03-31 RX ADMIN — HYDRALAZINE HYDROCHLORIDE 50 MG: 50 TABLET, FILM COATED ORAL at 21:43

## 2019-03-31 RX ADMIN — SIMVASTATIN 10 MG: 10 TABLET, FILM COATED ORAL at 21:43

## 2019-03-31 NOTE — PROGRESS NOTES
Patsy Zepeda in monitor room notified RN of patient's 's. Assessed patient, patient had returned from [facility]. Uresil clamped at present. /87, Apical . Patient denies CP, tightness or SOB. Patient sitting up in bedside recliner. Door open will continue to monitor.

## 2019-03-31 NOTE — PROGRESS NOTES
Pt lying in bed, resting on room air. Pt has uresil chest tube placed to left side, to suction at this time. Pt is alert and oriented x4 with no complaints at this time. No acute distress noted, pt instructed to call for assistance. Call light in reach.

## 2019-03-31 NOTE — PROGRESS NOTES
Pt c/o pain 6/10 in left side if her back. PRN morphine 2 mg given slow IV push at this time. VSS, will continue to monitor

## 2019-03-31 NOTE — PROGRESS NOTES
Problem: Falls - Risk of 
Goal: *Absence of Falls Description Document Dawna Hansen Fall Risk and appropriate interventions in the flowsheet. Outcome: Progressing Towards Goal 
Note:  
Fall Risk Interventions: 
Mobility Interventions: Patient to call before getting OOB Medication Interventions: Patient to call before getting OOB, Evaluate medications/consider consulting pharmacy Elimination Interventions: Call light in reach, Patient to call for help with toileting needs History of Falls Interventions: Evaluate medications/consider consulting pharmacy

## 2019-03-31 NOTE — PROGRESS NOTES
[place] [place] Admission Date: 3/27/2019 Daily Progress Note: 3/31/2019 The patient's chart is reviewed and the patient is discussed with the staff. Patient is a 75 y.o.  female seen and evaluated at the request of Dr. Miriam Barth. She was admitted yesterday for shortness of breath w h/o HTN, HLP presumed to be pulmonary edema from hypertensive urgency. She reports while trying to golf on Wednesday she was coughing heavily and was short of breath. Nitro did not help. She has no known lung disease, is not a smoker and actually was improving with less O2 requirement over the past 24 hours with diuresis. She went to get a repeat CXR today and demonstrated an unexpected L large PTX. She had not any chest trauma, procedures and only reports twisting her right knee as any sort of recent injury. Subjective:  
 
Uresil clamped for over 5 hours, but repeat CXR showed development of small left apical pneumothorax. Placed back to suction last night. Current Facility-Administered Medications Medication Dose Route Frequency  hydrALAZINE (APRESOLINE) tablet 50 mg  50 mg Oral TID  traMADol (ULTRAM) tablet 50 mg  50 mg Oral Q6H PRN  
 aspirin delayed-release tablet 81 mg  81 mg Oral DAILY  simvastatin (ZOCOR) tablet 10 mg  10 mg Oral QHS  triamterene-hydroCHLOROthiazide (MAXZIDE) 75-50 mg tablet 1 Tab  1 Tab Oral DAILY  sodium chloride (NS) flush 5-40 mL  5-40 mL IntraVENous Q8H  
 sodium chloride (NS) flush 5-40 mL  5-40 mL IntraVENous PRN  
 acetaminophen (TYLENOL) tablet 650 mg  650 mg Oral Q4H PRN  
 ondansetron (ZOFRAN) injection 4 mg  4 mg IntraVENous Q4H PRN  
 enoxaparin (LOVENOX) injection 40 mg  40 mg SubCUTAneous Q24H  
 morphine injection 2 mg  2 mg IntraVENous Q4H PRN Review of Systems Constitutional: negative for fever, chills, sweats Cardiovascular: negative for chest pain, palpitations, syncope, edema Gastrointestinal:  negative for dysphagia, reflux, vomiting, diarrhea, abdominal pain, or melena Neurologic:  negative for focal weakness, numbness, headache Objective:  
 
Vitals:  
 03/31/19 0005 03/31/19 9129 03/31/19 7947 03/31/19 0745 BP: 137/82 135/83  153/86 Pulse: 99 89  96 Resp: 20 18  18 Temp: 97.7 °F (36.5 °C) 97.4 °F (36.3 °C)  98.6 °F (37 °C) SpO2: 95% 94%  95% Weight:   204 lb 9.6 oz (92.8 kg) Height:      
 
Intake and Output:  
03/29 1901 - 03/31 0700 In: 1480 [P.O.:1480] Out: 2200 [Urine:2200] 03/31 0701 - 03/31 1900 In: 120 [P.O.:120] Out: - Physical Exam:  
Constitution:  the patient is well developed and in no acute distress EENMT:  Sclera clear, pupils equal, oral mucosa moist 
Respiratory: clear bilaterally, L sided uresil in place, no air leak even with cough Cardiovascular:  RRR without M,G,R 
Gastrointestinal: soft and non-tender; with positive bowel sounds. Musculoskeletal: warm without cyanosis. There is no lower leg edema. Skin:  no jaundice or rashes, no wounds Neurologic: no gross neuro deficits Psychiatric:  alert and oriented x 4 CXR: No PTX 3/31 CT Chest: small areas of atelectasis, no cystic lung disease or ILD. LAB No results for input(s): GLUCPOC in the last 72 hours. No lab exists for component: [place] No results for input(s): WBC, HGB, HCT, PLT, INR, HGBEXT, HCTEXT, PLTEXT, HGBEXT, HCTEXT, PLTEXT in the last 72 hours. No lab exists for component: INREXT, INREXT Recent Labs  
  03/30/19 
0406 03/29/19 
0430  139  
K 3.8 3.8  105 CO2 28 26 GLU 90 108* BUN 37* 42* CREA 1.21* 1.65* CA 9.5 9.4 No results for input(s): PH, PCO2, PO2, HCO3, PHI, PCO2I, PO2I, HCO3I in the last 72 hours. No results for input(s): LCAD, LAC in the last 72 hours. Assessment:  (Medical Decision Making) Hospital Problems  Date Reviewed: 2/22/2019 Codes Class Noted POA Sinus bradycardia ICD-10-CM: R00.1 ICD-9-CM: 427.89  3/29/2019 Unknown Spontaneous pneumothorax ICD-10-CM: J93.83 ICD-9-CM: 512.89  3/29/2019 Unknown * (Principal) Pulmonary edema ICD-10-CM: J81.1 ICD-9-CM: 230  3/27/2019 Yes Hypertensive urgency ICD-10-CM: I16.0 ICD-9-CM: 401.9  3/27/2019 Yes CKD (chronic kidney disease) ICD-10-CM: N18.9 ICD-9-CM: 585.9  3/27/2019 Yes Essential hypertension ICD-10-CM: I10 
ICD-9-CM: 401.9  2/9/2016 Yes Hyperlipidemia LDL goal <100 ICD-10-CM: E78.5 ICD-9-CM: 272.4  2/9/2016 Yes Plan:  (Medical Decision Making) --chest tube clamping trial again today. --CXR at 1330, sooner if shortness of breath worsens 
--If in distress, unclamp at uresil box  
--if no PTX on follow up CXR will consider removal of uresil --probably plan to keep overnight and repeat CXR in AM either way --Weaned off O2 More than 50% of the time documented was spent in face-to-face contact with the patient and in the care of the patient on the floor/unit where the patient is located.  
 
Ottie Cooks, MD

## 2019-03-31 NOTE — PROGRESS NOTES
End of shift report PM handoff RN: Olvin Acharya Pain:  No c/o pain during shift Neuro: A/Ox3. No c/o numbness or tingling Cardio:  S1/S2  Remote monitor,  & . Resp:  RA clear bilaterally. Symmetric chest wall excursion. GI/:  Voiding. Urine is yellow, clear. Last BM [id number] Diet: Cardiac regular diet. Tolerating well. Ambulation:  Patient ambulates independently. No falls during shift. Linen change: [id number] Additional info: Patient Continue [address] 
 
EOS handoff RN: Carol Phillips

## 2019-03-31 NOTE — PROGRESS NOTES
CXR reviewed. Appears stable. Hooked Uresil back to suction, no bubbling. Uresil removed. Patient tolerated well. Check CXR in AM and can D/C if stable.  
 
Jazmine Irizarry MD

## 2019-03-31 NOTE — PROGRESS NOTES
Hospitalist Progress Note Admit Date:  3/27/2019  2:09 PM  
Name:  Ceasar Laura Age:  75 y.o. 
:  1945 MRN:  [id number] PCP:  Saeed Rivera MD 
Treatment Team: Attending Provider: Shyam Medina MD; Utilization Review: Sheela Barrera, RN; Care Manager: Yvan Mackey, RN; Consulting Provider: Wilbert Rai MD 
 
HPI/Subjective:  
74 y/o WF admitted on 3/27 with hypertensive urgency and acute pulmonary edema. Repeat CXR to assess edema on 3/29 showed acute large left apical PTX and chest tube was inserted. 3/31: she is off O2, feels well, has ambulated a little bit to BR and chair. No chest pain or discomfort. CXR yesterday afternoon showed interval development of PTX so chest tube was unclamped; CXR this AM better. Trial of clamping again today, repeat CXR this afternoon. ROS otherwise negative. Objective:  
 
Patient Vitals for the past 24 hrs: 
 Temp Pulse Resp BP SpO2  
19 0745 98.6 °F (37 °C) 96 18 153/86 95 % 19 0337 97.4 °F (36.3 °C) 89 18 135/83 94 % 19 0005 97.7 °F (36.5 °C) 99 20 137/82 95 % 19 1959 97.4 °F (36.3 °C) 96 20 151/83 97 % 19 1500 97.7 °F (36.5 °C) 88 18 145/83 98 % 19 1213  90 18  99 % 19 1100 97.9 °F (36.6 °C) 81 19 145/84 100 % Oxygen Therapy O2 Sat (%): 95 % (19 0745) Pulse via Oximetry: 100 beats per minute (19 1737) O2 Device: Nasal cannula (19 1213) O2 Flow Rate (L/min): 1 l/min(weaned to Room Air) (19 1213) FIO2 (%): 40 % (19 1516) Intake/Output Summary (Last 24 hours) at 3/31/2019 2118 Last data filed at 3/31/2019 2565 Gross per 24 hour Intake 720 ml Output 1300 ml Net -580 ml *Note that automatically entered I/Os may not be accurate; dependent on patient compliance with collection and accurate  by techs. General:    Well nourished. Alert. CV:   RRR. No murmur, rub, or gallop. Lungs:   CTAB. No wheezing, rhonchi, or rales. Uresil left upper chest.  
Abdomen:   Soft, nontender, nondistended. Extremities: Warm and dry. No cyanosis or edema. Skin:     No rashes or jaundice. Neuro:  No gross focal deficits Data Review: 
I have reviewed all labs, meds, and studies from the last 24 hours: 
 
No results found for this or any previous visit (from the past 24 hour(s)). All Micro Results None Results for orders placed or performed during the hospital encounter of 19  
2D ECHO COMPLETE ADULT (TTE) W OR WO CONTR Narrative [facility] [address] [facility], [address] [facility] 
[phone] Transthoracic Echocardiogram 
2D, M-mode, Doppler, and Color Doppler Patient: Isabella Shankar 
MR #: [id number] : [id number] Age: 75 years Gender: Female Study date: 28-Mar-2019 Account #: [de-identified] Height: 62 in 
Weight: 210.5 lb 
BSA: 1.96 mï¾² Status:Routine Location: 816 BP: 167/ 78 Allergies: LISINOPRIL Sonographer:  Rigo Merino [facility] Group:  [facility] Referring Physician:  Lore Alvarenga MD 
Reading Physician:  aCrlee Lewis MD 
 
INDICATIONS: Shortness of Breath PROCEDURE: This was a routine study. A transthoracic echocardiogram was 
performed. The study included complete 2D imaging, M-mode, complete spectral 
Doppler, and color Doppler. Intravenous contrast (Definity, 2 ml) was 
administered. Image quality was adequate. LEFT VENTRICLE: Size was normal. Systolic function was normal. Ejection 
fraction was estimated in the range of 55 % to 60 %. There were no regional 
wall motion abnormalities. Wall thickness was mildly increased. Left 
ventricular diastolic function was normal. Average E/e' of 11.15. 
 
RIGHT VENTRICLE: The size was normal. Systolic function was normal. 
 
LEFT ATRIUM: The atrium was moderately dilated. RIGHT ATRIUM: The atrium was mildly dilated. SYSTEMIC VEINS: IVC: The inferior vena cava was normal in size and course. AORTIC VALVE: The valve was structurally normal, tri-commissural. There was  
no 
evidence for stenosis. There was no insufficiency. MITRAL VALVE: Valve structure was normal. There was no evidence for stenosis. There was mild regurgitation. TRICUSPID VALVE: The valve structure was normal. There was no evidence for 
stenosis. There was mild regurgitation. PULMONIC VALVE: The valve structure was normal. There was no evidence for 
stenosis. There was trace insufficiency. PERICARDIUM: There was no pericardial effusion. AORTA: The root exhibited normal size. SUMMARY: 
 
-  Left ventricle: Systolic function was normal. Ejection fraction was 
estimated in the range of 55 % to 60 %. There were no regional wall motion 
abnormalities. Wall thickness was mildly increased. -  Left atrium: The atrium was moderately dilated. SYSTEM MEASUREMENT TABLES 
 
2D mode AoR Diam (2D): 2.9 cm 
LA Dimension (2D): 4.3 cm Left Atrium Systolic Volume Index; Method of Disks, Biplane; 2D mode;: 52.6 
ml/m2 IVS/LVPW (2D): 0.9 IVSd (2D): 1.2 cm LVIDd (2D): 4.6 cm 
LVIDs (2D): 3.1 cm 
LVPWd (2D): 1.4 cm RVIDd (2D): 3 cm Unspecified Scan Mode Peak Grad; Mean; Antegrade Flow: 14 mm[Hg] Vmax; Antegrade Flow: 193 cm/s Prepared and signed by Arik Conti MD 
Signed 28-Mar-2019 12:07:19 Current Meds: 
Current Facility-Administered Medications Medication Dose Route Frequency  hydrALAZINE (APRESOLINE) tablet 50 mg  50 mg Oral TID  traMADol (ULTRAM) tablet 50 mg  50 mg Oral Q6H PRN  
 aspirin delayed-release tablet 81 mg  81 mg Oral DAILY  simvastatin (ZOCOR) tablet 10 mg  10 mg Oral QHS  triamterene-hydroCHLOROthiazide (MAXZIDE) 75-50 mg tablet 1 Tab  1 Tab Oral DAILY  sodium chloride (NS) flush 5-40 mL  5-40 mL IntraVENous Q8H  
 sodium chloride (NS) flush 5-40 mL  5-40 mL IntraVENous PRN  
  acetaminophen (TYLENOL) tablet 650 mg  650 mg Oral Q4H PRN  
 ondansetron (ZOFRAN) injection 4 mg  4 mg IntraVENous Q4H PRN  
 enoxaparin (LOVENOX) injection 40 mg  40 mg SubCUTAneous Q24H  
 morphine injection 2 mg  2 mg IntraVENous Q4H PRN Other Studies (last 24 hours): Xr Chest [facility] Result Date: 3/31/2019 EXAM: XR CHEST PORT INDICATION: ptx COMPARISON: 3/30/2019 FINDINGS: A portable AP radiograph of the chest was obtained at 0443 hours. Decreased size of the small left apical pneumothorax. Pleural catheter is now extrathoracic. . The lungs are clear. The cardiac and mediastinal contours and pulmonary vascularity are normal.  The bones and soft tissues are grossly within normal limits. IMPRESSION: Decreased size of a small left apical pneumothorax. Pleural catheter is now extrathoracic. Xr Chest [facility] Result Date: 3/30/2019 Portable chest: History: Left-sided chest tube, assess for pneumothorax. .  Comparison: Earlier on the same day Findings: A single view of the chest was obtained at 1311 hours. There is a trace left apical pneumothorax which appears newly visualized. Smallbore left-sided chest tube remains present. The cardiac and mediastinal silhouette are normal in size and configuration. Mild left basilar opacity remains. There are no significant pleural effusions. The pulmonary vascularity is within normal limits. Impression: 1. Interval development of trace left apical pneumothorax. 2. Stable mild left basilar airspace opacity. Assessment and Plan:  
 
Hospital Problems as of 3/31/2019 Date Reviewed: 2/22/2019 Codes Class Noted - Resolved POA Sinus bradycardia ICD-10-CM: R00.1 ICD-9-CM: 427.89  3/29/2019 - Present Unknown Spontaneous pneumothorax ICD-10-CM: J93.83 ICD-9-CM: 512.89  3/29/2019 - Present Unknown * (Principal) Pulmonary edema ICD-10-CM: J81.1 ICD-9-CM: 563  3/27/2019 - Present Yes Hypertensive urgency ICD-10-CM: I16.0 ICD-9-CM: 401.9  3/27/2019 - Present Yes  
   
 CKD (chronic kidney disease) ICD-10-CM: N18.9 ICD-9-CM: 585.9  3/27/2019 - Present Yes Essential hypertension ICD-10-CM: I10 
ICD-9-CM: 401.9  2/9/2016 - Present Yes Hyperlipidemia LDL goal <100 ICD-10-CM: E78.5 ICD-9-CM: 272.4  2/9/2016 - Present Yes Plan: # Left apical PTX 
            - S/p chest tube placement 3/29 
            - CT chest reviewed - Chest tubed clamped overnight 3/30. F/u CXR. - Appreciate pulm help 
  
# MICHELINE v CKD 
            - Better, at baseline.  
  
# Sinus bradycardia 
            - DC diltiazem and BB 
            - Needs to f/u with cardiologist at discharge 
  
# Hypertensive urgency/HTN 
            - YBGGYIF resolved. 
            - Start PO hydralazine since AV blockers dc due to bradycardia. 
  
# Acute pulmonary edema 
            - Suspect secondary to hypertensive urgency              - FVS normal LVEF, no DD.  
  
# HLD 
            - statin 
  
# Sinus tachycardia 
            - resolved DC planning/Dispo: Clamping trial tonight. Plan for home when able, likely as early as tomorrow. Diet:  DIET CARDIAC 
DVT ppx:  lovenox Signed: 
Arcelia Galvez MD

## 2019-03-31 NOTE — PROGRESS NOTES
Received report from Bakari Oquendo, [address] [facility]. Patient awake in bed. Respirations present. On room air. No signs of distress. Bed low and locked. Call light within reach. Will continue to monitor.

## 2019-03-31 NOTE — PROGRESS NOTES
Assumed care of patient. Assessment completed and documented, see docflow. Patient resting quietly in bed. Awakens to voice, alert and oriented. Denies pain or needs. NAD noted at present. Respirations even and non labored on RA. Left CT Uresil intact, set to suction. Remote tele, . Encouraged to call for needs. Call light within reach. Will continue to monitor.

## 2019-04-01 ENCOUNTER — APPOINTMENT (OUTPATIENT)
Dept: GENERAL RADIOLOGY | Age: 74
DRG: 189 | End: 2019-04-01
Attending: INTERNAL MEDICINE
Payer: MEDICARE

## 2019-04-01 ENCOUNTER — APPOINTMENT (OUTPATIENT)
Dept: GENERAL RADIOLOGY | Age: 74
DRG: 189 | End: 2019-04-01
Attending: NURSE PRACTITIONER
Payer: MEDICARE

## 2019-04-01 VITALS
TEMPERATURE: 98.2 F | HEART RATE: 97 BPM | HEIGHT: 62 IN | OXYGEN SATURATION: 96 % | WEIGHT: 210.9 LBS | DIASTOLIC BLOOD PRESSURE: 87 MMHG | BODY MASS INDEX: 38.81 KG/M2 | RESPIRATION RATE: 19 BRPM | SYSTOLIC BLOOD PRESSURE: 137 MMHG

## 2019-04-01 PROBLEM — R00.1 SINUS BRADYCARDIA: Status: RESOLVED | Noted: 2019-03-29 | Resolved: 2019-04-01

## 2019-04-01 PROBLEM — J93.83 SPONTANEOUS PNEUMOTHORAX: Status: RESOLVED | Noted: 2019-03-29 | Resolved: 2019-04-01

## 2019-04-01 PROBLEM — N18.9 CKD (CHRONIC KIDNEY DISEASE): Chronic | Status: ACTIVE | Noted: 2019-03-27

## 2019-04-01 PROBLEM — J81.1 PULMONARY EDEMA: Status: RESOLVED | Noted: 2019-03-27 | Resolved: 2019-04-01

## 2019-04-01 PROBLEM — I16.0 HYPERTENSIVE URGENCY: Status: RESOLVED | Noted: 2019-03-27 | Resolved: 2019-04-01

## 2019-04-01 PROCEDURE — 71045 X-RAY EXAM CHEST 1 VIEW: CPT

## 2019-04-01 PROCEDURE — 99232 SBSQ HOSP IP/OBS MODERATE 35: CPT | Performed by: INTERNAL MEDICINE

## 2019-04-01 PROCEDURE — 74011250637 HC RX REV CODE- 250/637: Performed by: INTERNAL MEDICINE

## 2019-04-01 RX ORDER — HYDRALAZINE HYDROCHLORIDE 50 MG/1
50 TABLET, FILM COATED ORAL 3 TIMES DAILY
Qty: 90 TAB | Refills: 0 | Status: SHIPPED | OUTPATIENT
Start: 2019-04-01 | End: 2019-05-03 | Stop reason: SDUPTHER

## 2019-04-01 RX ADMIN — TRIAMTERENE AND HYDROCHLOROTHIAZIDE 1 TABLET: 75; 50 TABLET ORAL at 09:22

## 2019-04-01 RX ADMIN — Medication 10 ML: at 05:59

## 2019-04-01 RX ADMIN — HYDRALAZINE HYDROCHLORIDE 50 MG: 50 TABLET, FILM COATED ORAL at 09:22

## 2019-04-01 RX ADMIN — ASPIRIN 81 MG: 81 TABLET, COATED ORAL at 09:22

## 2019-04-01 NOTE — PROGRESS NOTES
Claudia Barbour Admission Date: 3/27/2019 Daily Progress Note: 4/1/2019 Patient is C 8 y.o.  female seen and evaluated at the request of Dr. Nayeli Larsen She has no known lung disease, is not a smoker and actually was improving with less O2 requirement over the past 24 hours with diuresis. She went to get a repeat CXR today and demonstrated an unexpected L large PTX. She had not any chest trauma, procedures and only reports twisting her right knee as any sort of recent injury.  
Uresil inserted and placed to suction. PTX improved and uresil removed. Small residual apical PTX. Subjective: No acute distress. Review of Systems Respiratory: negative for cough, sputum, pneumonia or wheezing Current Facility-Administered Medications Medication Dose Route Frequency  hydrALAZINE (APRESOLINE) tablet 50 mg  50 mg Oral TID  traMADol (ULTRAM) tablet 50 mg  50 mg Oral Q6H PRN  
 aspirin delayed-release tablet 81 mg  81 mg Oral DAILY  simvastatin (ZOCOR) tablet 10 mg  10 mg Oral QHS  triamterene-hydroCHLOROthiazide (MAXZIDE) 75-50 mg tablet 1 Tab  1 Tab Oral DAILY  sodium chloride (NS) flush 5-40 mL  5-40 mL IntraVENous Q8H  
 sodium chloride (NS) flush 5-40 mL  5-40 mL IntraVENous PRN  
 acetaminophen (TYLENOL) tablet 650 mg  650 mg Oral Q4H PRN  
 ondansetron (ZOFRAN) injection 4 mg  4 mg IntraVENous Q4H PRN  
 enoxaparin (LOVENOX) injection 40 mg  40 mg SubCUTAneous Q24H  
 morphine injection 2 mg  2 mg IntraVENous Q4H PRN Objective:  
 
Vitals:  
 04/01/19 4847 04/01/19 3365 04/01/19 5534 04/01/19 2954 BP: 138/78   171/85 Pulse: 88 (!) 103  96 Resp: 17   18 Temp: 98.2 °F (36.8 °C)   97.9 °F (36.6 °C) SpO2: 96%   97% Weight:   210 lb 14.4 oz (95.7 kg) Height:      
 
Intake and Output:  
03/30 1901 - 04/01 0700 In: 720 [P.O.:720] Out: 800 [Urine:800] No intake/output data recorded. Physical Exam: Constitutional: the patient is well develop HEENT: Sclera clear, pupils equal, oral mucosa moist 
Lungs: CTA bilaterally on RA Cardiovascular: RRR without M,G,R 
Abd/GI: soft and non-tender; with positive bowel sounds. Ext: warm without cyanosis. There is no lower leg edema. Musculoskeletal: moves all four extremities with equal strength Skin: no jaundice or rashes, no wounds Neuro: no gross neuro deficits Lines/Drains: IV 
Nutrition:cardiac LAB Recent Labs  
  03/30/19 
0406   
K 3.8  CO2 28  
GLU 90 BUN 37* CREA 1.21* CXR:  
4/1 
 
  
CT Chest: small areas of atelectasis, no cystic lung disease or ILD. 
 
  
 
 
Assessment:  
 
Patient Active Problem List  
Diagnosis Code  Essential hypertension I10  
 Hyperlipidemia LDL goal <100 E78.5  Chest pain, unspecified R07.9  Sick sinus syndrome (HCC) I49.5  CKD (chronic kidney disease) N18.9  Spontaneous pneumothorax J93.83 Active Problems: 
  Essential hypertension (2/9/2016) Hyperlipidemia LDL goal <100 (2/9/2016) CKD (chronic kidney disease) (3/27/2019) Spontaneous pneumothorax (3/29/2019) 
uresil removed, small left apical PTX. On RA 
 
PLAN: 
 
- Repeat CXR in 4 hours - if remains stable- discharge home with close follow up ROSA Shabazz More than 50% of time documented was spent in face-to-face contact with the patient and in the care of the patient on the floor/unit where the patient is located. Lungs: clear with equal BS Heart:  RRR with no Murmur/Rubs/Gallops Additional Comments:  Await repeat CXR to rule out expanding PTX. If stable, can discharge with office follow up. I have spoken with and examined the patient. I agree with the above assessment and plan as documented.  
 
Michael Frost MD

## 2019-04-01 NOTE — PROGRESS NOTES
Discharge instructions and prescriptions provided and explained to the pt. Medication information sheets for all new prescriptions given and reviewed with patient. Opportunity for questions provided. Pt leaving floor via wheelchair.

## 2019-04-01 NOTE — PROGRESS NOTES
Patient is discharging home today. She is independent in all ADLs. No discharge planning needs identified. Case Management will remain available to assist as needed. Care Management Interventions PCP Verified by CM: Yes(Sue Shukla, ) Mode of Transport at Discharge: Other (see comment)(family) Transition of Care Consult (CM Consult): Discharge Planning Discharge Durable Medical Equipment: No 
Physical Therapy Consult: No 
Occupational Therapy Consult: No 
Speech Therapy Consult: No 
Current Support Network: Own Home, Lives Alone Confirm Follow Up Transport: Family Plan discussed with Pt/Family/Caregiver: Yes Freedom of Choice Offered: Yes [employer] Information Provided?: No 
Discharge Location Discharge Placement: Home

## 2019-04-01 NOTE — DISCHARGE SUMMARY
Hospitalist Discharge Summary     Admit Date:  3/27/2019  2:09 PM   Name:  Katya Room   Age:  75 y.o.  :  1945   MRN:  [id number]   PCP:  Andi Morocho MD  Treatment Team: Attending Provider: Christopher Mclain MD; Utilization Review: Mark Sharp RN; Care Manager: Patricio Corbett RN; Consulting Provider: Svetlana Tolbert MD    Problem List for this Hospitalization:  Hospital Problems as of 2019 Date Reviewed: 2019          Codes Class Noted - Resolved POA    CKD (chronic kidney disease) (Chronic) ICD-10-CM: N18.9  ICD-9-CM: 585.9  3/27/2019 - Present Yes        Essential hypertension (Chronic) ICD-10-CM: I10  ICD-9-CM: 401.9  2016 - Present Yes        Hyperlipidemia LDL goal <100 (Chronic) ICD-10-CM: E78.5  ICD-9-CM: 272.4  2016 - Present Yes        RESOLVED: Sinus bradycardia ICD-10-CM: R00.1  ICD-9-CM: 427.89  3/29/2019 - 2019 Yes        RESOLVED: Spontaneous pneumothorax ICD-10-CM: J93.83  ICD-9-CM: 512.89  3/29/2019 - 2019 Yes        * (Principal) RESOLVED: Pulmonary edema ICD-10-CM: J81.1  ICD-9-CM: 937  3/27/2019 - 2019 Yes        RESOLVED: Hypertensive urgency ICD-10-CM: I16.0  ICD-9-CM: 401.9  3/27/2019 - 2019 Yes              Hospital Course:  Mrs. Angeles Olea is a very nice 74 y/o WF with a h/o HTN, HLD, ? SSS who was admitted on 3/27 with acute onset severe SOB while playing golf. CXR in the ED showed pulmonary edema and initial BP was 211/85. She was on Bipap and weaned to [place] and was admitted to the medical floor. She had not taken her BP meds that day. She was diuresed with a few doses of IV lasix and her breathing improved. Blood pressure improved as well. She developed bradycardia and her AVN blockers (diltiazem and metoprolol) had to be discontinued. Oral hydralazine was added for BP control. On 3/29 she went to radiology for a follow up x-ray for her pulmonary edema. She was found to have a large spontaneous apical pneumothorax.  The patient had been stable and off O2 earlier that day but did complain of some vague left sided chest/back discomfort prior to her x-ray. Pulmonology was consulted and a chest tube was placed. PTX improved and tube was clamped but this recurred and she had to be placed back to suction. Follow up x-rays remained stable with trace apical PTX and her chest tube was removed on 3/31. X-ray on  again shows trace apical left PTX. She is off oxygen and ambulating without difficulty. She is medically stable for discharge. She needs follow up with Cardiology (already scheduled this week), Pulmonology and her PCP. Disposition: Home or Self CareHome  Activity: Activity as tolerated  Diet: DIET CARDIAC Regular    Follow up instructions, discharge meds at bottom of this note. Plan was discussed with patient, CM, nursing. All questions answered. Patient was stable at time of discharge. Diagnostic Imaging/Tests:   Xr Chest Port    Result Date: 3/27/2019  Chest X-ray INDICATION: 53-year-old female with raspatory distress urographic comparison exams: Chest film dated 2016 A portable AP view of the chest was obtained. FINDINGS: The lungs are clear. There is central vessel congestion with perihilar edema. .  The heart size is normal.  The bony thorax is intact. IMPRESSION: Central vessel congestion with perihilar edema.        Echocardiogram results:  Results for orders placed or performed during the hospital encounter of 19   2D ECHO COMPLETE ADULT (TTE) W OR [address], [address] [facility]  [phone]    Transthoracic Echocardiogram  2D, M-mode, Doppler, and Color Doppler    Patient: Julio Cesar April  MR #: [id number]  : [id number]  Age: 75 years  Gender: Female  Study date: 28-Mar-2019  Account #: [de-identified]  Height: 62 in  Weight: 210.5 lb  BSA: 1.96 mï¾²  Status:Routine  Location: 816  BP: 167/ 78    Allergies: LISINOPRIL    Sonographer:  Bridgette Miles RDCS  Group:  [facility]  Referring Physician:  Arcelia Galvez MD  Reading Physician:  Bill Gerard MD    INDICATIONS: Shortness of Breath    PROCEDURE: This was a routine study. A transthoracic echocardiogram was  performed. The study included complete 2D imaging, M-mode, complete spectral  Doppler, and color Doppler. Intravenous contrast (Definity, 2 ml) was  administered. Image quality was adequate. LEFT VENTRICLE: Size was normal. Systolic function was normal. Ejection  fraction was estimated in the range of 55 % to 60 %. There were no regional  wall motion abnormalities. Wall thickness was mildly increased. Left  ventricular diastolic function was normal. Average E/e' of 11.15.    RIGHT VENTRICLE: The size was normal. Systolic function was normal.    LEFT ATRIUM: The atrium was moderately dilated. RIGHT ATRIUM: The atrium was mildly dilated. SYSTEMIC VEINS: IVC: The inferior vena cava was normal in size and course. AORTIC VALVE: The valve was structurally normal, tri-commissural. There was   no  evidence for stenosis. There was no insufficiency. MITRAL VALVE: Valve structure was normal. There was no evidence for stenosis. There was mild regurgitation. TRICUSPID VALVE: The valve structure was normal. There was no evidence for  stenosis. There was mild regurgitation. PULMONIC VALVE: The valve structure was normal. There was no evidence for  stenosis. There was trace insufficiency. PERICARDIUM: There was no pericardial effusion. AORTA: The root exhibited normal size. SUMMARY:    -  Left ventricle: Systolic function was normal. Ejection fraction was  estimated in the range of 55 % to 60 %. There were no regional wall motion  abnormalities. Wall thickness was mildly increased. -  Left atrium: The atrium was moderately dilated.     SYSTEM MEASUREMENT TABLES    2D mode  AoR Diam (2D): 2.9 cm  LA Dimension (2D): 4.3 cm  Left Atrium Systolic Volume Index; Method of Disks, Biplane; 2D mode;: 52.6  ml/m2  IVS/LVPW (2D): 0.9  IVSd (2D): 1.2 cm  LVIDd (2D): 4.6 cm  LVIDs (2D): 3.1 cm  LVPWd (2D): 1.4 cm  RVIDd (2D): 3 cm    Unspecified Scan Mode  Peak Grad; Mean; Antegrade Flow: 14 mm[Hg]  Vmax; Antegrade Flow: 193 cm/s    Prepared and signed by    Emanuel Canales MD  Signed 28-Mar-2019 12:07:19         Procedures done this admission:  Procedure(s):  CHEST TUBES INSERTION  ULTRASOUND    All Micro Results     None          Labs: Results:       BMP, Mg, Phos Recent Labs     03/30/19  0406      K 3.8      CO2 28   AGAP 7   BUN 37*   CREA 1.21*   CA 9.5   GLU 90      CBC No results for input(s): WBC, RBC, HGB, HCT, PLT, GRANS, LYMPH, EOS, MONOS, BASOS, IG, ANEU, ABL, EDNNIS, ABM, ABB, AIG, HGBEXT, HCTEXT, PLTEXT, HGBEXT, HCTEXT, PLTEXT in the last 72 hours. LFT No results for input(s): SGOT, ALT, TBIL, AP, TP, ALB, GLOB, AGRAT, GPT in the last 72 hours.    Cardiac Testing Lab Results   Component Value Date/Time     (H) 03/27/2019 02:18 PM    Troponin-I, Qt. 0.04 03/28/2019 05:07 AM      Coagulation Tests No results found for: PTP, INR, APTT   A1c No results found for: HBA1C, HGBE8, ACC2MGXG, RQU4ZQVW   Lipid Panel Lab Results   Component Value Date/Time    Cholesterol, total 206 (H) 02/22/2019 08:50 AM    HDL Cholesterol 76 02/22/2019 08:50 AM    LDL, calculated 114 (H) 02/22/2019 08:50 AM    VLDL, calculated 16 02/22/2019 08:50 AM    Triglyceride 80 02/22/2019 08:50 AM      Thyroid Panel Lab Results   Component Value Date/Time    TSH 1.970 02/22/2019 08:50 AM    TSH 1.160 11/30/2018 11:05 AM        Most Recent UA Lab Results   Component Value Date/Time    Color Yellow 02/22/2019 08:50 AM    Appearance Cloudy (A) 02/22/2019 08:50 AM    pH (UA) 5.5 02/22/2019 08:50 AM    Ketone Negative 02/22/2019 08:50 AM    Bilirubin Negative 02/22/2019 08:50 AM    Blood Negative 02/22/2019 08:50 AM    Nitrites Negative 02/22/2019 08:50 AM Leukocyte Esterase 2+ (A) 02/22/2019 08:50 AM    WBC 6-10 (A) 02/22/2019 08:50 AM    RBC 0-2 02/22/2019 08:50 AM    Epithelial cells >10 (A) 02/22/2019 08:50 AM    Bacteria Few 02/22/2019 08:50 AM    Mucus Present 02/22/2019 08:50 AM        Allergies   Allergen Reactions    Lisinopril Angioedema     Immunization History   Administered Date(s) Administered    Influenza Vaccine 10/01/2015, 11/02/2018    Pneumococcal Conjugate (PCV-13) 02/09/2016    Pneumococcal Vaccine (Unspecified Type) 11/25/2013    Td 02/14/2015       All Labs from Last 24 Hrs:  No results found for this or any previous visit (from the past 24 hour(s)).     Current Med List in Hospital:   Current Facility-Administered Medications   Medication Dose Route Frequency    hydrALAZINE (APRESOLINE) tablet 50 mg  50 mg Oral TID    traMADol (ULTRAM) tablet 50 mg  50 mg Oral Q6H PRN    aspirin delayed-release tablet 81 mg  81 mg Oral DAILY    simvastatin (ZOCOR) tablet 10 mg  10 mg Oral QHS    triamterene-hydroCHLOROthiazide (MAXZIDE) 75-50 mg tablet 1 Tab  1 Tab Oral DAILY    sodium chloride (NS) flush 5-40 mL  5-40 mL IntraVENous Q8H    sodium chloride (NS) flush 5-40 mL  5-40 mL IntraVENous PRN    acetaminophen (TYLENOL) tablet 650 mg  650 mg Oral Q4H PRN    ondansetron (ZOFRAN) injection 4 mg  4 mg IntraVENous Q4H PRN    enoxaparin (LOVENOX) injection 40 mg  40 mg SubCUTAneous Q24H    morphine injection 2 mg  2 mg IntraVENous Q4H PRN       Discharge Exam:  Patient Vitals for the past 24 hrs:   Temp Pulse Resp BP SpO2   04/01/19 1114 98.2 °F (36.8 °C) (!) 106 19 137/87 96 %   04/01/19 0726 97.9 °F (36.6 °C) 96 18 171/85 97 %   04/01/19 0358  (!) 103      04/01/19 0250 98.2 °F (36.8 °C) 88 17 138/78 96 %   04/01/19 0133  97      04/01/19 0026 98.2 °F (36.8 °C) 82 18 124/75 97 %   03/31/19 1943 98.2 °F (36.8 °C) 98 18 132/77 97 %   03/31/19 1500 98.3 °F (36.8 °C) 97 18 134/76 95 %     Oxygen Therapy  O2 Sat (%): 96 % (04/01/19 1114)  Pulse via Oximetry: 100 beats per minute (03/27/19 1737)  O2 Device: Room air (03/31/19 2042)  O2 Flow Rate (L/min): 1 l/min(weaned to Room Air) (03/30/19 1213)  FIO2 (%): 40 % (03/27/19 1516)    Intake/Output Summary (Last 24 hours) at 4/1/2019 1329  Last data filed at 4/1/2019 0927  Gross per 24 hour   Intake 840 ml   Output 1000 ml   Net -160 ml       *Note that automatically entered I/Os may not be accurate; dependent on patient compliance with collection and accurate  by assistants. General:    Well nourished. Alert. Eyes:   Normal sclera. Extraocular movements intact. ENT:  Normocephalic, atraumatic. Moist mucous membranes  CV:   Regular rate and rhythm. No murmur, rub, or gallop. Lungs:  Clear to auscultation bilaterally. No wheezing, rhonchi, or rales. Abdomen: Soft, nontender, nondistended. Bowel sounds normal.   Extremities: Warm and dry. No cyanosis or edema. Neurologic: CN II-XII grossly intact. Sensation intact. Skin:     No rashes or jaundice. Psych:  Normal mood and affect. Discharge Info:   Current Discharge Medication List      START taking these medications    Details   hydrALAZINE (APRESOLINE) 50 mg tablet Take 1 Tab by mouth three (3) times daily. Qty: 90 Tab, Refills: 0         CONTINUE these medications which have NOT CHANGED    Details   fluocinolone (DERMA-SMOOTHE/FS SCALP OIL) 0.01 % oil 5 mL by Scalp route every Monday and Friday. Qty: 1 Bottle, Refills: 5      simvastatin (ZOCOR) 40 mg tablet TAKE ONE TABLET BY MOUTH EACH NIGHT AT BEDTIME  Qty: 90 Tab, Refills: 2      triamterene-hydroCHLOROthiazide (MAXZIDE) 75-50 mg per tablet TAKE ONE TABLET BY MOUTH DAILY  Qty: 90 Tab, Refills: 2      aspirin delayed-release 81 mg tablet Take  by mouth daily. omeprazole (PRILOSEC) 20 mg capsule Take 20 mg by mouth daily. cholecalciferol, vitamin D3, 2,000 unit tab Take  by mouth.       nitroglycerin (NITROSTAT) 0.4 mg SL tablet 1 Tab by SubLINGual route every five (5) minutes as needed for Chest Pain. Indications: ANGINA  Qty: 25 Tab, Refills: 11      biotin 2,500 mcg tab Take  by mouth two (2) times a day. STOP taking these medications       dilTIAZem ER (CARDIZEM LA) 360 mg Tb24 tablet Comments:   Reason for Stopping:         metoprolol succinate (TOPROL-XL) 100 mg tablet Comments:   Reason for Stopping: Follow Up Orders: Follow-up Appointments   Procedures    FOLLOW UP VISIT Appointment in: Other (Specify) PCP -- 1 week  Cardiology -- this week Pulmonology -- 2 weeks     PCP -- 1 week   Cardiology -- this week  Pulmonology -- 2 weeks     Standing Status:   Standing     Number of Occurrences:   1     Order Specific Question:   Appointment in     Answer: Other (Specify)       Follow-up Information     Follow up With Specialties Details Why Contact Info    Jhon Puckett MD [facility] In 1 week  [address] [facility] [address] [facility] [address] follow up. Spontaneous pneumothorax. [address] [facility]  [address] [facility]    Latia Araiza 1923 follow up.  [facility]  [phone]            Time spent in patient discharge planning and coordination 35 minutes.     Signed:  Zoe Kebede MD

## 2019-04-01 NOTE — PROGRESS NOTES
Patient slept well throughout the night. Patient asleep in bed at this time. Respirations present. No signs of distress. Bedside report given to oncoming RN, [facility].

## 2019-04-01 NOTE — PROGRESS NOTES
Assumed care of patient. Assessment completed and documented, see docflow. Patient resting quietly in bed. Awakens to voice, alert and oriented. Denies pain or needs. NAD noted at present. Respirations even and non labored on RA. Remote tele, NSR 92 Encouraged to call for needs. Call light within reach. Will continue to monitor.

## 2019-04-01 NOTE — PROGRESS NOTES
Monitor room called x 2 with separate episodes of -150's. Both occurrences, patient ambulating in room, from bed to bathroom and returning. Dr Bruna Mcdonald made aware.

## 2019-04-01 NOTE — DISCHARGE INSTRUCTIONS
Patient Education        Bradycardia: Care Instructions  Your Care Instructions    Bradycardia is a slow heart rate. If your heart beats too slowly, it can't supply your body with enough blood. This can make you weak or dizzy. Or it may make you pass out. Sometimes medicine can cause this problem. If this happens, your doctor may have you adjust one of your medicines. If a medicine is not the problem, your doctor may recommend a pacemaker. It is important to treat bradycardia so that you don't get more serious health problems. Your doctor will want to see you on a routine schedule to make sure that your heartbeat is normal.  Follow-up care is a key part of your treatment and safety. Be sure to make and go to all appointments, and call your doctor if you are having problems. It's also a good idea to know your test results and keep a list of the medicines you take. How can you care for yourself at home? · Take your medicines exactly as prescribed. Call your doctor if you think you are having a problem with your medicine. If your bradycardia is caused by another disease, your doctor will try to treat the disease. If it is caused by heart medicines, he or she will adjust your medicines. · Make lifestyle changes to improve your heart health. ? Get regular exercise. Try for 30 minutes on most days of the week. If you do not have other heart problems, you likely do not have limits on the type or level of activity that you can do. You may want to walk, swim, bike, or do other activities. Ask your doctor what level of exercise is safe for you. ? To control your cholesterol, avoid foods with a lot of fat, saturated fat, or sodium. Try to eat more fiber. And if your doctor says it's okay, get some exercise on most days. ? Do not smoke. Smoking can make your heart condition worse. If you need help quitting, talk to your doctor about stop-smoking programs and medicines.  These can increase your chances of quitting for good.  ? Limit alcohol to 2 drinks a day for men and 1 drink a day for women. Too much alcohol can cause health problems. Pacemaker  If you have a pacemaker, you will get more specific information about it. Be sure to:  · Check your pulse as your doctor tells you. · Have your pacemaker checked as often as your doctor recommends. You may be able to do this over the phone or computer. · Avoid strong magnetic or electrical fields. These include MRIs, welding equipment, and generators. · You will be checked several times right after you get your pacemaker and when it is time to have the battery changed. Batteries last for 5 to 15 years. · You can talk on a cell phone. But keep it 6 inches away from your pacemaker. · Microwaves, TVs, radios, and kitchen and bathroom appliances won't harm you. When should you call for help? Call 911 anytime you think you may need emergency care. For example, call if:    · You have symptoms of sudden heart failure. These may include:  ? Severe trouble breathing. ? A fast or irregular heartbeat. ? Coughing up pink, foamy mucus. ? You passed out.     · You have symptoms of a stroke. These may include:  ? Sudden numbness, tingling, weakness, or loss of movement in your face, arm, or leg, especially on only one side of your body. ? Sudden vision changes. ? Sudden trouble speaking. ? Sudden confusion or trouble understanding simple statements. ? Sudden problems with walking or balance. ? A sudden, severe headache that is different from past headaches.    Call your doctor now or seek immediate medical care if:    · You have new or changed symptoms of heart failure, such as:  ? New or increased shortness of breath. ? New or worse swelling in your legs, ankles, or feet. ? Sudden weight gain, such as more than 2 to 3 pounds in a day or 5 pounds in a week. (Your doctor may suggest a different range of weight gain.)  ? Feeling dizzy or lightheaded or like you may faint. ?  Feeling so tired or weak that you cannot do your usual activities. ? Not sleeping well. Shortness of breath wakes you at night. You need extra pillows to prop yourself up to breathe easier.    Watch closely for changes in your health, and be sure to contact your doctor if:    · You do not get better as expected. Where can you learn more? Go to http://hali-estelita.info/. Enter Q982 in the search box to learn more about \"Bradycardia: Care Instructions. \"  Current as of: July 22, 2018  Content Version: 11.9  © 0821-6913 [employer]. Care instructions adapted under license by [employer] (which disclaims liability or warranty for this information). If you have questions about a medical condition or this instruction, always ask your healthcare professional. [place] [address] any warranty or liability for your use of this information. Patient Education        Collapsed Lung: Care Instructions  Your Care Instructions    A collapsed lung (pneumothorax) is a buildup of air in the space between the lung and the chest wall. As more air builds up in this space, the pressure against the lung makes the lung collapse. This causes shortness of breath and chest pain because your lung cannot fully expand. A collapsed lung is usually caused by an injury to the chest, but it may also occur suddenly without an injury because of a lung illness, such as emphysema or lung fibrosis. Your lung may collapse after lung surgery or another medical procedure. Sometimes it happens for no known reason in an otherwise healthy person (spontaneous pneumothorax). Treatment depends on the cause of the collapse. It may heal with rest, although your doctor will want to keep track of your progress. It can take several days for the lung to expand again. Your doctor may have drained the air with a needle or tube inserted into the space between your chest and the collapsed lung.  If you have a chest tube, be sure to follow your doctor's instructions about how to care for the tube. You may need further treatment if you are not getting better. Surgery is sometimes needed to keep the lung inflated. The doctor will want to keep track of your progress, so you will need a follow-up exam within a few days. The doctor has checked you carefully, but problems can develop later. If you notice any problems or new symptoms, get medical treatment right away. Follow-up care is a key part of your treatment and safety. Be sure to make and go to all appointments, and call your doctor if you are having problems. It's also a good idea to know your test results and keep a list of the medicines you take. How can you care for yourself at home? · Get plenty of rest and sleep. You may feel weak and tired for a while, but your energy level will improve with time. · Hold a pillow against your chest when you cough or take deep breaths. This will support your chest and decrease your pain. · Take pain medicines exactly as directed. ? If the doctor gave you a prescription medicine for pain, take it as prescribed. ? If you are not taking a prescription pain medicine, ask your doctor if you can take an over-the-counter medicine. · If your doctor prescribed antibiotics, take them as directed. Do not stop taking them just because you feel better. You need to take the full course of antibiotics. · If you have a bandage over your chest tube, or the place where the chest tube was inserted, keep it clean and dry. Follow your doctor's instructions on bandage care. · If you go home with a tube in place, follow the doctor's directions. Do not adjust the tube in any way. This could break the seal or cause other problems. Keep the tube dry. · Avoid any movements that require your muscles, especially your chest muscles, to strain. Such movements include laughing hard, bearing down to have a bowel movement, and heavy lifting.  Try not to cough.  · Do not fly in an airplane until your doctor tells you it is okay. Avoid any situations where there is increased air pressure. · Do not smoke or allow others to smoke around you. If you need help quitting, talk to your doctor about stop-smoking programs and medicines. These can increase your chances of quitting for good. When should you call for help? Call 911 anytime you think you may need emergency care. For example, call if:    · You have severe trouble breathing.     · You passed out (lost consciousness).    Call your doctor now or seek immediate medical care if:    · You have new or worse trouble breathing.     · You have new pain or your pain gets worse.     · You have a fever.     · You cough up blood.     · Your chest tube starts to come out or falls out.     · You are bleeding through the bandage where the tube was put in.    Watch closely for changes in your health, and be sure to contact your doctor if:    · The skin around the place where the chest tube was put in is red or irritated.     · You do not get better as expected. Where can you learn more? Go to http://hali-estelita.info/. Enter N943 in the search box to learn more about \"Collapsed Lung: Care Instructions. \"  Current as of: September 5, 2018  Content Version: 11.9  © 9699-4246 [employer]. Care instructions adapted under license by [employer] (which disclaims liability or warranty for this information). If you have questions about a medical condition or this instruction, always ask your healthcare professional. [place] [address] any warranty or liability for your use of this information.          DISCHARGE SUMMARY from Nurse    PATIENT INSTRUCTIONS:    After general anesthesia or intravenous sedation, for 24 hours or while taking prescription Narcotics:  · Limit your activities  · Do not drive and operate hazardous machinery  · Do not make important personal or business decisions  · Do  not drink alcoholic beverages  · If you have not urinated within 8 hours after discharge, please contact your surgeon on call. Report the following to your surgeon:  · Excessive pain, swelling, redness or odor of or around the surgical area  · Temperature over 100.5  · Nausea and vomiting lasting longer than 4 hours or if unable to take medications  · Any signs of decreased circulation or nerve impairment to extremity: change in color, persistent  numbness, tingling, coldness or increase pain  · Any questions    What to do at Home:      *  Please give a list of your current medications to your Primary Care Provider. *  Please update this list whenever your medications are discontinued, doses are      changed, or new medications (including over-the-counter products) are added. *  Please carry medication information at all times in case of emergency situations. These are general instructions for a healthy lifestyle:    No smoking/ No tobacco products/ Avoid exposure to second hand smoke  Surgeon General's Warning:  Quitting smoking now greatly reduces serious risk to your health. Obesity, smoking, and sedentary lifestyle greatly increases your risk for illness    A healthy diet, regular physical exercise & weight monitoring are important for maintaining a healthy lifestyle    You may be retaining fluid if you have a history of heart failure or if you experience any of the following symptoms:  Weight gain of 3 pounds or more overnight or 5 pounds in a week, increased swelling in our hands or feet or shortness of breath while lying flat in bed. Please call your doctor as soon as you notice any of these symptoms; do not wait until your next office visit.     Recognize signs and symptoms of STROKE:    F-face looks uneven    A-arms unable to move or move unevenly    S-speech slurred or non-existent    T-time-call 911 as soon as signs and symptoms begin-DO NOT go       Back to bed or wait to see if you get better-TIME IS BRAIN. Warning Signs of HEART ATTACK     Call 911 if you have these symptoms:   Chest discomfort. Most heart attacks involve discomfort in the center of the chest that lasts more than a few minutes, or that goes away and comes back. It can feel like uncomfortable pressure, squeezing, fullness, or pain.  Discomfort in other areas of the upper body. Symptoms can include pain or discomfort in one or both arms, the back, neck, jaw, or stomach.  Shortness of breath with or without chest discomfort.  Other signs may include breaking out in a cold sweat, nausea, or lightheadedness. Don't wait more than five minutes to call 911 - MINUTES MATTER! Fast action can save your life. Calling 911 is almost always the fastest way to get lifesaving treatment. Emergency Medical Services staff can begin treatment when they arrive -- up to an hour sooner than if someone gets to the hospital by car. The discharge information has been reviewed with the patient. The patient verbalized understanding. Discharge medications reviewed with the patient and appropriate educational materials and side effects teaching were provided.   ___________________________________________________________________________________________________________________________________

## 2019-04-02 ENCOUNTER — PATIENT OUTREACH (OUTPATIENT)
Dept: CASE MANAGEMENT | Age: 74
End: 2019-04-02

## 2019-04-02 NOTE — PROGRESS NOTES
This note will not be viewable in [address] Date/Time of Call: 04/02/19 104pm  
What was the patient hospitalized for? Pulmonary Edema Consent for [facility] Call Does the patient understand his/her diagnosis and/or treatment and what happened during the hospitalization? Patient agrees to call Patient verbalizes understanding of hospital stay and diagnosis Did the patient receive discharge instructions? Yes  
CM Assessed Risk for Readmission:  
 
 
Patient stated Risk for Readmission:  Patient is a moderate risk for readmission due to diagnoses and/ or comorbidities No concerns voiced at this time Review any discharge instructions ([facility] Ask patient if they understand these. Do they have any questions? Reviewed Were home services ordered (nursing, PT, OT, ST, etc.)? No   
If so, has the first visit occurred? If not, why? (Assist with coordination of services if necessary. ) 
 NA Was any DME ordered? No  
If so, has it been received? If not, why?  (Assist patient in obtaining DME orders &/or equipment if necessary. ) NA Complete a review of all medications (new, continued and discontinued meds per the D/C instructions and medication tab in [facility]). Medications reviewed START taking: 
hydrALAZINE 50 mg tablet (APRESOLINE) STOP taking: 
dilTIAZem  mg Tb24 tablet (CARDIZEM LA) metoprolol succinate 100 mg tablet (TOPROL-XL) Were all new prescriptions filled? If not, why?  (Assist patient in obtaining medications if necessary  escalate for CCM &/or SW if ongoing issues are verbalized by pt or anticipated) Yes Does the patient understand the purpose and dosing instructions for all medications? (If patient has questions, provide explanation and education.) Patient verbalizes understanding of medications Does the patient have any problems in performing ADLs? (If patient is unable to perform ADLs  what is the limiting factor(s)? Do they have a support system that can assist? If no support system is present, discuss possible assistance that they may be able to obtain. Escalate for CCM/SW if ongoing issues are verbalized by pt or anticipated) Patient is independent with ADLs Does the patient have all follow-up appointments scheduled? 7 day f/up with PCP?  
(f/up with PCP may be w/in 14 days if patient has a f/up with their specialist w/in 7 days) 7-14 day f/up with specialist?  
(or per discharge instructions) If f/up has not been made  what actions has the care coordinator made to accomplish this? Has transportation been arranged? Yes 
 
 
04/08/19 at 330pm 
 
 
 
Cardiology 04/04/19 at 1115am, Pulmonary 04/15/19 at 1130am 
 
Reviewed appointment information with patient Patient is independent with transportation Any other questions or concerns expressed by the patient? No questions or concerns are voiced at this time. Care Coordinator contact information provided should any needs arise Schedule next appointment with BUD Elliott or refer to RN Case Manager/ per the workflow guidelines. When is care coordinators next follow-up call scheduled? If referred for CCM  what RN care manager was the referral assigned? Within 30 days Within 30 days NA  
VIJAY Call Completed By: Franchesca Mclean CMA Care Coordinator

## 2019-04-15 ENCOUNTER — HOSPITAL ENCOUNTER (OUTPATIENT)
Dept: GENERAL RADIOLOGY | Age: 74
Discharge: HOME | End: 2019-04-15
Payer: MEDICARE

## 2019-04-15 DIAGNOSIS — J93.9 PNEUMOTHORAX, UNSPECIFIED TYPE: ICD-10-CM

## 2019-04-15 PROBLEM — E66.01 SEVERE OBESITY (HCC): Status: ACTIVE | Noted: 2019-04-15

## 2019-04-15 PROCEDURE — 71046 X-RAY EXAM CHEST 2 VIEWS: CPT

## 2019-04-23 ENCOUNTER — PATIENT OUTREACH (OUTPATIENT)
Dept: CASE MANAGEMENT | Age: 74
End: 2019-04-23

## 2019-04-23 NOTE — PROGRESS NOTES
This note will not be viewable in [address] Transitions of Care  Follow up Outreach Note Outreach type Phone call: Spoke with patient Date/Time of Outreach: 04/23/19 at 1231pm  
 
Has patient attended PCP or specialist follow-up appointments since last contact? What was outcome of appointment? When is next follow-up scheduled? Patient has completed FUs with PCP, Cardiology, and Pulmonology with FUs scheduled appropriately she states the Pulmonologist released her and she is doing wonderful Review medications. Any medication changes since last outreach? Does patient have any questions or issues related to their medications? Patient has medications, no significant changes, no questions or concerns at this time Home health active? If yes  any issue? Progress? NA Referrals needed? 
(CM, SW, HH, etc.) 
 NA Other issues/Miscellaneous? (Transportation, access to meals, ability to perform ADLs, adequate caregiver support, etc.) No questions or concerns are voiced at this time. Patient is independent with ADLs Next Outreach Scheduled? Graduation from program? 
 NA Yes Next Steps/Goals (if applicable): 
 NA Outreach completed by: 
 Ekta Ramos CMA Community Care Coordinator

## 2020-03-02 ENCOUNTER — HOSPITAL ENCOUNTER (OUTPATIENT)
Dept: MAMMOGRAPHY | Age: 75
Discharge: HOME | End: 2020-03-02
Attending: FAMILY MEDICINE
Payer: MEDICARE

## 2020-03-02 DIAGNOSIS — Z12.31 VISIT FOR SCREENING MAMMOGRAM: ICD-10-CM

## 2020-03-02 PROCEDURE — 77067 SCR MAMMO BI INCL CAD: CPT

## 2020-08-14 PROBLEM — N18.30 STAGE 3 CHRONIC KIDNEY DISEASE (HCC): Status: ACTIVE | Noted: 2019-03-27

## 2021-02-08 ENCOUNTER — TRANSCRIBE ORDER (OUTPATIENT)
Dept: SCHEDULING | Age: 76
End: 2021-02-08

## 2021-02-08 DIAGNOSIS — Z12.31 SCREENING MAMMOGRAM FOR HIGH-RISK PATIENT: Primary | ICD-10-CM

## 2021-02-12 PROBLEM — E66.01 SEVERE OBESITY (HCC): Status: RESOLVED | Noted: 2019-04-15 | Resolved: 2021-02-12

## 2021-03-05 ENCOUNTER — HOSPITAL ENCOUNTER (OUTPATIENT)
Dept: MAMMOGRAPHY | Age: 76
Discharge: HOME | End: 2021-03-05
Attending: FAMILY MEDICINE
Payer: MEDICARE

## 2021-03-05 DIAGNOSIS — Z12.31 SCREENING MAMMOGRAM FOR HIGH-RISK PATIENT: ICD-10-CM

## 2021-03-05 PROCEDURE — 77067 SCR MAMMO BI INCL CAD: CPT

## 2022-02-23 ENCOUNTER — TRANSCRIBE ORDER (OUTPATIENT)
Dept: SCHEDULING | Age: 77
End: 2022-02-23

## 2022-02-23 DIAGNOSIS — Z12.31 ENCOUNTER FOR SCREENING MAMMOGRAM FOR MALIGNANT NEOPLASM OF BREAST: Primary | ICD-10-CM

## 2022-02-25 PROBLEM — D50.9 MICROCYTIC ANEMIA: Status: ACTIVE | Noted: 2022-02-25

## 2022-03-12 ENCOUNTER — HOSPITAL ENCOUNTER (OUTPATIENT)
Dept: MAMMOGRAPHY | Age: 77
Discharge: HOME | End: 2022-03-12
Attending: FAMILY MEDICINE
Payer: MEDICARE

## 2022-03-12 DIAGNOSIS — Z12.31 ENCOUNTER FOR SCREENING MAMMOGRAM FOR MALIGNANT NEOPLASM OF BREAST: ICD-10-CM

## 2022-03-12 PROCEDURE — 77067 SCR MAMMO BI INCL CAD: CPT

## 2022-03-19 PROBLEM — D50.9 MICROCYTIC ANEMIA: Status: ACTIVE | Noted: 2022-02-25

## 2022-03-19 PROBLEM — N18.30 STAGE 3 CHRONIC KIDNEY DISEASE (HCC): Status: ACTIVE | Noted: 2019-03-27

## 2022-03-20 PROBLEM — I49.5 SICK SINUS SYNDROME (HCC): Status: ACTIVE | Noted: 2018-08-20

## 2022-08-13 DIAGNOSIS — Z51.81 MEDICATION MONITORING ENCOUNTER: ICD-10-CM

## 2022-08-13 DIAGNOSIS — I10 ESSENTIAL HYPERTENSION: Primary | ICD-10-CM

## 2022-08-13 DIAGNOSIS — D50.9 MICROCYTIC ANEMIA: ICD-10-CM

## 2022-08-19 ENCOUNTER — NURSE ONLY (OUTPATIENT)
Dept: FAMILY MEDICINE CLINIC | Facility: CLINIC | Age: 77
End: 2022-08-19

## 2022-08-19 DIAGNOSIS — I10 ESSENTIAL HYPERTENSION: ICD-10-CM

## 2022-08-19 DIAGNOSIS — Z51.81 MEDICATION MONITORING ENCOUNTER: ICD-10-CM

## 2022-08-19 DIAGNOSIS — D50.9 MICROCYTIC ANEMIA: ICD-10-CM

## 2022-08-19 LAB
BASOPHILS # BLD: 0.1 K/UL (ref 0–0.2)
BASOPHILS NFR BLD: 1 % (ref 0–2)
DIFFERENTIAL METHOD BLD: ABNORMAL
EOSINOPHIL # BLD: 0.1 K/UL (ref 0–0.8)
EOSINOPHIL NFR BLD: 2 % (ref 0.5–7.8)
ERYTHROCYTE [DISTWIDTH] IN BLOOD BY AUTOMATED COUNT: 14 % (ref 11.9–14.6)
HCT VFR BLD AUTO: 27.4 % (ref 35.8–46.3)
HGB BLD-MCNC: 8.2 G/DL (ref 11.7–15.4)
IMM GRANULOCYTES # BLD AUTO: 0 K/UL (ref 0–0.5)
IMM GRANULOCYTES NFR BLD AUTO: 1 % (ref 0–5)
LYMPHOCYTES # BLD: 1.2 K/UL (ref 0.5–4.6)
LYMPHOCYTES NFR BLD: 24 % (ref 13–44)
MCH RBC QN AUTO: 30.4 PG (ref 26.1–32.9)
MCHC RBC AUTO-ENTMCNC: 29.9 G/DL (ref 31.4–35)
MCV RBC AUTO: 101.5 FL (ref 79.6–97.8)
MONOCYTES # BLD: 0.2 K/UL (ref 0.1–1.3)
MONOCYTES NFR BLD: 4 % (ref 4–12)
NEUTS SEG # BLD: 3.3 K/UL (ref 1.7–8.2)
NEUTS SEG NFR BLD: 68 % (ref 43–78)
NRBC # BLD: 0 K/UL (ref 0–0.2)
PLATELET # BLD AUTO: 184 K/UL (ref 150–450)
PMV BLD AUTO: 12.1 FL (ref 9.4–12.3)
RBC # BLD AUTO: 2.7 M/UL (ref 4.05–5.2)
WBC # BLD AUTO: 4.9 K/UL (ref 4.3–11.1)

## 2022-08-20 LAB
ALBUMIN SERPL-MCNC: 3.9 G/DL (ref 3.2–4.6)
ALBUMIN/GLOB SERPL: 0.9 {RATIO} (ref 1.2–3.5)
ALP SERPL-CCNC: 70 U/L (ref 50–136)
ALT SERPL-CCNC: 14 U/L (ref 12–65)
ANION GAP SERPL CALC-SCNC: 8 MMOL/L (ref 7–16)
AST SERPL-CCNC: 23 U/L (ref 15–37)
BILIRUB SERPL-MCNC: 0.6 MG/DL (ref 0.2–1.1)
BUN SERPL-MCNC: 27 MG/DL (ref 8–23)
CALCIUM SERPL-MCNC: 9.5 MG/DL (ref 8.3–10.4)
CHLORIDE SERPL-SCNC: 104 MMOL/L (ref 98–107)
CHOLEST SERPL-MCNC: 178 MG/DL
CO2 SERPL-SCNC: 25 MMOL/L (ref 21–32)
CREAT SERPL-MCNC: 1.8 MG/DL (ref 0.6–1)
FOLATE SERPL-MCNC: 24.4 NG/ML (ref 3.1–17.5)
GLOBULIN SER CALC-MCNC: 4.3 G/DL (ref 2.3–3.5)
GLUCOSE SERPL-MCNC: 81 MG/DL (ref 65–100)
HDLC SERPL-MCNC: 45 MG/DL (ref 40–60)
HDLC SERPL: 4 {RATIO}
LDLC SERPL CALC-MCNC: 97 MG/DL
POTASSIUM SERPL-SCNC: 3.7 MMOL/L (ref 3.5–5.1)
PROT SERPL-MCNC: 8.2 G/DL (ref 6.3–8.2)
SODIUM SERPL-SCNC: 137 MMOL/L (ref 136–145)
TRIGL SERPL-MCNC: 180 MG/DL (ref 35–150)
VIT B12 SERPL-MCNC: 235 PG/ML (ref 193–986)
VLDLC SERPL CALC-MCNC: 36 MG/DL (ref 6–23)

## 2022-08-26 ENCOUNTER — OFFICE VISIT (OUTPATIENT)
Dept: FAMILY MEDICINE CLINIC | Facility: CLINIC | Age: 77
End: 2022-08-26
Payer: MEDICARE

## 2022-08-26 VITALS
HEART RATE: 88 BPM | OXYGEN SATURATION: 96 % | DIASTOLIC BLOOD PRESSURE: 72 MMHG | HEIGHT: 63 IN | BODY MASS INDEX: 31.33 KG/M2 | WEIGHT: 176.8 LBS | RESPIRATION RATE: 16 BRPM | SYSTOLIC BLOOD PRESSURE: 122 MMHG

## 2022-08-26 DIAGNOSIS — E78.5 HYPERLIPIDEMIA LDL GOAL <100: ICD-10-CM

## 2022-08-26 DIAGNOSIS — D53.9 MACROCYTIC ANEMIA: ICD-10-CM

## 2022-08-26 DIAGNOSIS — I10 ESSENTIAL HYPERTENSION: ICD-10-CM

## 2022-08-26 DIAGNOSIS — N17.9 ACUTE KIDNEY INJURY SUPERIMPOSED ON CHRONIC KIDNEY DISEASE (HCC): Primary | ICD-10-CM

## 2022-08-26 DIAGNOSIS — N18.9 ACUTE KIDNEY INJURY SUPERIMPOSED ON CHRONIC KIDNEY DISEASE (HCC): Primary | ICD-10-CM

## 2022-08-26 DIAGNOSIS — Z00.00 MEDICARE ANNUAL WELLNESS VISIT, SUBSEQUENT: ICD-10-CM

## 2022-08-26 PROBLEM — D50.9 MICROCYTIC ANEMIA: Status: RESOLVED | Noted: 2022-02-25 | Resolved: 2022-08-26

## 2022-08-26 LAB
BASOPHILS # BLD: 0.1 K/UL (ref 0–0.2)
BASOPHILS NFR BLD: 1 % (ref 0–2)
DIFFERENTIAL METHOD BLD: ABNORMAL
EOSINOPHIL # BLD: 0.1 K/UL (ref 0–0.8)
EOSINOPHIL NFR BLD: 2 % (ref 0.5–7.8)
ERYTHROCYTE [DISTWIDTH] IN BLOOD BY AUTOMATED COUNT: 13.8 % (ref 11.9–14.6)
HCT VFR BLD AUTO: 27.7 % (ref 35.8–46.3)
HGB BLD-MCNC: 8.3 G/DL (ref 11.7–15.4)
IMM GRANULOCYTES # BLD AUTO: 0 K/UL (ref 0–0.5)
IMM GRANULOCYTES NFR BLD AUTO: 0 % (ref 0–5)
LYMPHOCYTES # BLD: 2.1 K/UL (ref 0.5–4.6)
LYMPHOCYTES NFR BLD: 40 % (ref 13–44)
MCH RBC QN AUTO: 30.4 PG (ref 26.1–32.9)
MCHC RBC AUTO-ENTMCNC: 30 G/DL (ref 31.4–35)
MCV RBC AUTO: 101.5 FL (ref 79.6–97.8)
MONOCYTES # BLD: 0.3 K/UL (ref 0.1–1.3)
MONOCYTES NFR BLD: 6 % (ref 4–12)
NEUTS SEG # BLD: 2.7 K/UL (ref 1.7–8.2)
NEUTS SEG NFR BLD: 51 % (ref 43–78)
NRBC # BLD: 0 K/UL (ref 0–0.2)
PLATELET # BLD AUTO: 164 K/UL (ref 150–450)
PMV BLD AUTO: 12.3 FL (ref 9.4–12.3)
RBC # BLD AUTO: 2.73 M/UL (ref 4.05–5.2)
WBC # BLD AUTO: 5.3 K/UL (ref 4.3–11.1)

## 2022-08-26 PROCEDURE — G0439 PPPS, SUBSEQ VISIT: HCPCS | Performed by: FAMILY MEDICINE

## 2022-08-26 PROCEDURE — 1090F PRES/ABSN URINE INCON ASSESS: CPT | Performed by: FAMILY MEDICINE

## 2022-08-26 PROCEDURE — G8427 DOCREV CUR MEDS BY ELIG CLIN: HCPCS | Performed by: FAMILY MEDICINE

## 2022-08-26 PROCEDURE — 1036F TOBACCO NON-USER: CPT | Performed by: FAMILY MEDICINE

## 2022-08-26 PROCEDURE — 99214 OFFICE O/P EST MOD 30 MIN: CPT | Performed by: FAMILY MEDICINE

## 2022-08-26 PROCEDURE — G8417 CALC BMI ABV UP PARAM F/U: HCPCS | Performed by: FAMILY MEDICINE

## 2022-08-26 PROCEDURE — 1123F ACP DISCUSS/DSCN MKR DOCD: CPT | Performed by: FAMILY MEDICINE

## 2022-08-26 PROCEDURE — G8400 PT W/DXA NO RESULTS DOC: HCPCS | Performed by: FAMILY MEDICINE

## 2022-08-26 ASSESSMENT — ENCOUNTER SYMPTOMS
VOMITING: 0
COUGH: 0
SHORTNESS OF BREATH: 1
BLOOD IN STOOL: 0
ABDOMINAL PAIN: 0
NAUSEA: 0

## 2022-08-26 ASSESSMENT — LIFESTYLE VARIABLES
HOW MANY STANDARD DRINKS CONTAINING ALCOHOL DO YOU HAVE ON A TYPICAL DAY: 1 OR 2
HOW OFTEN DO YOU HAVE A DRINK CONTAINING ALCOHOL: 2-3 TIMES A WEEK

## 2022-08-26 ASSESSMENT — ANXIETY QUESTIONNAIRES
6. BECOMING EASILY ANNOYED OR IRRITABLE: 0
7. FEELING AFRAID AS IF SOMETHING AWFUL MIGHT HAPPEN: 0
5. BEING SO RESTLESS THAT IT IS HARD TO SIT STILL: 0
1. FEELING NERVOUS, ANXIOUS, OR ON EDGE: 0
3. WORRYING TOO MUCH ABOUT DIFFERENT THINGS: 0
2. NOT BEING ABLE TO STOP OR CONTROL WORRYING: 0
4. TROUBLE RELAXING: 0
GAD7 TOTAL SCORE: 0

## 2022-08-26 ASSESSMENT — PATIENT HEALTH QUESTIONNAIRE - PHQ9
SUM OF ALL RESPONSES TO PHQ9 QUESTIONS 1 & 2: 0
SUM OF ALL RESPONSES TO PHQ QUESTIONS 1-9: 0
2. FEELING DOWN, DEPRESSED OR HOPELESS: 0
SUM OF ALL RESPONSES TO PHQ QUESTIONS 1-9: 0
SUM OF ALL RESPONSES TO PHQ QUESTIONS 1-9: 0
1. LITTLE INTEREST OR PLEASURE IN DOING THINGS: 0
SUM OF ALL RESPONSES TO PHQ QUESTIONS 1-9: 0

## 2022-08-26 NOTE — PROGRESS NOTES
[address] [facility],[address], DO  [address], [address] [facility] [address]  Ph No:  [phone]  Fax:  [phone]        Assessment/Plan:   Nathan Perdue was seen today for hypertension, cholesterol problem and medicare awv. Diagnoses and all orders for this visit:    Acute kidney injury superimposed on chronic kidney disease ([facility] [address]  Reviewed below labs with patient. Considerable decrease in GFR from baseline, 29 at recent check with baseline at 44. Await repeat labs today. -     Basic Metabolic Panel; Future  -     Basic Metabolic Panel    Macrocytic anemia  Considerable decrease in hemoglobin. Patient's last hemoglobin was 11 6 months ago, has decreased to 8.2. She notes mild shortness of breath for months but no other concerning symptoms. She had recent labs including normal U16 and folic acid level. Await iron levels. Advised patient this may involve further work-up, referral or ER visit depending on the trend. Advise she go to the ER with concerning symptoms for GI bleed such as black or bloody stools or hematemesis  -     CBC with Auto Differential; Future  -     Ferritin; Future  -     Iron; Future  -     Soluble transferrin receptor; Future  -     Soluble transferrin receptor  -     Iron  -     Ferritin  -     CBC with Auto Differential    Essential hypertension  Blood pressure controlled with hydralazine, Maxide. History of allergy to ACE inhibitors. Hyperlipidemia LDL goal <100  LDL at goal with Zetia and simvastatin    Medicare annual wellness visit, subsequent        Labs:  No results found for this visit on 08/26/22.     Nurse Only on 08/19/2022   Component Date Value Ref Range Status    Vitamin B-12 08/19/2022 235  193 - 986 pg/mL Final    Folate 08/19/2022 24.4 (A) 3.1 - 17.5 ng/mL Final    Cholesterol, Total 08/19/2022 178  <200 MG/DL Final    Comment: Borderline High: 200-239 mg/dL  High: Greater than or equal to 240 mg/dL      Triglycerides 08/19/2022 180 (A) 35 - 150 MG/DL Final    Comment: Borderline High: 150-199 mg/dL, High: 200-499 mg/dL  Very High: Greater than or equal to 500 mg/dL      HDL 08/19/2022 45  40 - 60 MG/DL Final    LDL Calculated 08/19/2022 97  <100 MG/DL Final    Comment: Near Optimal: 100-129 mg/dL  Borderline High: 130-159, High: 160-189 mg/dL  Very High: Greater than or equal to 190 mg/dL      VLDL Cholesterol Calculated 08/19/2022 36 (A) 6.0 - 23.0 MG/DL Final    Chol/HDL Ratio 08/19/2022 4.0    Final    Sodium 08/19/2022 137  136 - 145 mmol/L Final    Potassium 08/19/2022 3.7  3.5 - 5.1 mmol/L Final    Chloride 08/19/2022 104  98 - 107 mmol/L Final    CO2 08/19/2022 25  21 - 32 mmol/L Final    Anion Gap 08/19/2022 8  7 - 16 mmol/L Final    Glucose 08/19/2022 81  65 - 100 mg/dL Final    BUN 08/19/2022 27 (A) 8 - 23 MG/DL Final    Creatinine 08/19/2022 1.80 (A) 0.6 - 1.0 MG/DL Final    GFR  08/19/2022 35 (A) >60 ml/min/1.73m2 Final    GFR Non- 08/19/2022 29 (A) >60 ml/min/1.73m2 Final    Comment:   Estimated GFR is calculated using the Modification of Diet in Renal Disease (MDRD) Study equation, reported for both  Americans (GFRAA) and non- Americans (GFRNA), and normalized to 1.73m2 body surface area. The physician must decide which value applies to the patient. The MDRD study equation should only be used in individuals age 25 or older. It has not been validated for the following: pregnant women, patients with serious comorbid conditions,or on certain medications, or persons with extremes of body size, muscle mass, or nutritional status.       Calcium 08/19/2022 9.5  8.3 - 10.4 MG/DL Final    Total Bilirubin 08/19/2022 0.6  0.2 - 1.1 MG/DL Final    ALT 08/19/2022 14  12 - 65 U/L Final    AST 08/19/2022 23  15 - 37 U/L Final    Alk Phosphatase 08/19/2022 70  50 - 136 U/L Final    Total Protein 08/19/2022 8.2  6.3 - 8.2 g/dL Final    Albumin 08/19/2022 3.9  3.2 - 4.6 g/dL Final    Globulin 08/19/2022 4.3 (A) 2.3 - 3.5 g/dL Final    Albumin/Globulin Ratio 08/19/2022 0.9 (A) 1.2 - 3.5   Final    WBC 08/19/2022 4.9  4.3 - 11.1 K/uL Final    RBC 08/19/2022 2.70 (A) 4.05 - 5.2 M/uL Final    Hemoglobin 08/19/2022 8.2 (A) 11.7 - 15.4 g/dL Final    Hematocrit 08/19/2022 27.4 (A) 35.8 - 46.3 % Final    MCV 08/19/2022 101.5 (A) 79.6 - 97.8 FL Final    MCH 08/19/2022 30.4  26.1 - 32.9 PG Final    MCHC 08/19/2022 29.9 (A) 31.4 - 35.0 g/dL Final    RDW 08/19/2022 14.0  11.9 - 14.6 % Final    Platelets 64/33/2961 184  150 - 450 K/uL Final    MPV 08/19/2022 12.1  9.4 - 12.3 FL Final    nRBC 08/19/2022 0.00  0.0 - 0.2 K/uL Final    **Note: Absolute NRBC parameter is now reported with Hemogram**    Differential Type 08/19/2022 AUTOMATED    Final    Seg Neutrophils 08/19/2022 68  43 - 78 % Final    Lymphocytes 08/19/2022 24  13 - 44 % Final    Monocytes 08/19/2022 4  4.0 - 12.0 % Final    Eosinophils % 08/19/2022 2  0.5 - 7.8 % Final    Basophils 08/19/2022 1  0.0 - 2.0 % Final    Immature Granulocytes 08/19/2022 1  0.0 - 5.0 % Final    Segs Absolute 08/19/2022 3.3  1.7 - 8.2 K/UL Final    Absolute Lymph # 08/19/2022 1.2  0.5 - 4.6 K/UL Final    Absolute Mono # 08/19/2022 0.2  0.1 - 1.3 K/UL Final    Absolute Eos # 08/19/2022 0.1  0.0 - 0.8 K/UL Final    Basophils Absolute 08/19/2022 0.1  0.0 - 0.2 K/UL Final    Absolute Immature Granulocyte 08/19/2022 0.0  0.0 - 0.5 K/UL Final           Rachelle Marshall is a 65 y.o. female who is seen for evaluation of   Chief Complaint   Patient presents with    Hypertension    Cholesterol Problem    Medicare AWV       HPI:   She is here today to follow-up chronic issues. She feels well. She does not feel that she is having any problems with memory difficulties. She states she is not forgetting appointments or to go places or getting lost.  She is not having black or bloody stools. She is not having indigestion, heartburn or epigastric pain.   Her blood pressure at home is running well.    Review of Systems:  Review of Systems   Constitutional:  Negative for chills, fever and unexpected weight change. Respiratory:  Positive for shortness of breath (mild x months). Negative for cough. Cardiovascular:  Negative for chest pain and leg swelling. Gastrointestinal:  Negative for abdominal pain, blood in stool, nausea and vomiting. Psychiatric/Behavioral:  The patient is not nervous/anxious. History:  Past Medical History:   Diagnosis Date    CKD (chronic kidney disease) stage 3, GFR 30-59 ml/min (HCC)     Essential hypertension     Hyperlipidemia LDL goal <100     Menopause     S/P colonoscopy 2017 2022 to 2027    Spontaneous pneumothorax 2019    SSS (sick sinus syndrome) ([facility])        Past Surgical History:   Procedure Laterality Date    BREAST BIOPSY Right     BREAST BIOPSY Left     COLONOSCOPY      HYSTERECTOMY, TOTAL ABDOMINAL (CERVIX REMOVED)      MICHAEL AND BSO (CERVIX REMOVED)         Current Outpatient Medications   Medication Sig Dispense Refill    Multiple Vitamins-Minerals (PRESERVISION AREDS 2 PO) Take by mouth      aspirin 81 MG EC tablet Take by mouth daily      Biotin 2.5 MG TABS Take 5,000 mcg by mouth daily      Cholecalciferol 50 MCG (2000 UT) TABS Take by mouth      ezetimibe (ZETIA) 10 MG tablet Take 10 mg by mouth daily      hydrALAZINE (APRESOLINE) 50 MG tablet Take 50 mg by mouth 3 times daily      nitroGLYCERIN (NITROSTAT) 0.4 MG SL tablet Place 0.4 mg under the tongue      omeprazole (PRILOSEC) 20 MG delayed release capsule Take 20 mg by mouth daily as needed      simvastatin (ZOCOR) 40 MG tablet TAKE ONE TABLET BY MOUTH EVERY NIGHT Indications: high cholesterol      triamterene-hydroCHLOROthiazide (MAXZIDE) 75-50 MG per tablet Take 1 tablet by mouth daily       No current facility-administered medications for this visit.        Immunization History   Administered Date(s) Administered    COVID-19, MODERNA BLUE border, Primary or Immunocompromised, (age 12y+), IM, 100 mcg/0.5mL 01/29/2021, 02/26/2021    Influenza Virus Vaccine 10/01/2015, 11/02/2018    Influenza, High Dose (Fluzone 65 yrs and older) 09/13/2019, 10/01/2020, 11/18/2021    Influenza, Triv, inactivated, subunit, adjuvanted, IM (Fluad 65 yrs and older) 09/20/2019    Pneumococcal Conjugate 13-valent ([id number]) 02/09/2016    Pneumococcal Polysaccharide ([id number]) 09/20/2019    Pneumococcal Vaccine 11/25/2013    Td vaccine (adult) 02/14/2015    Zoster Recombinant (Shingrix) 09/04/2019, 03/04/2020       PHQ-9  Little interest or pleasure in doing things: Not at all  Feeling down, depressed, or hopeless: Not at all  PHQ-9 Total Score: 0     Vitals:    Vitals:    08/26/22 0828   BP: 122/72   Site: Left Upper Arm   Position: Sitting   Pulse: 88   Resp: 16   SpO2: 96%   Weight: 176 lb 12.8 oz (80.2 kg)   Height: 5' 3\" (1.6 m)          Physical Exam:  Physical Exam  Vitals reviewed. Constitutional:       Appearance: Normal appearance. HENT:      Head: Normocephalic and atraumatic. Cardiovascular:      Rate and Rhythm: Normal rate and regular rhythm. Heart sounds: No murmur heard. Pulmonary:      Effort: Pulmonary effort is normal. No respiratory distress. Breath sounds: Normal breath sounds. No wheezing or rhonchi. Abdominal:      General: There is no distension. Palpations: There is no mass. Tenderness: There is no abdominal tenderness. There is no right CVA tenderness, left CVA tenderness, guarding or rebound. Hernia: No hernia is present. Musculoskeletal:      Cervical back: Neck supple. Right lower leg: No edema. Left lower leg: No edema. Lymphadenopathy:      Cervical: No cervical adenopathy. Skin:     General: Skin is warm and dry. Neurological:      Mental Status: She is alert.    Psychiatric:         Mood and Affect: Mood normal.         Behavior: Behavior normal.           Fortino Bustamante, DO    This note was generated using Dragon voice recognition software. There may be medical errors due to computer generated translation            Medicare Annual Wellness Visit    Rose Paz is here for Hypertension, Cholesterol Problem, and Medicare AWV    Assessment & Plan   Medicare annual wellness visit, subsequent  Acute kidney injury superimposed on chronic kidney disease ([facility] [address]  -     Basic Metabolic Panel; Future  Macrocytic anemia  -     CBC with Auto Differential; Future  -     Ferritin; Future  -     Iron; Future  -     Soluble transferrin receptor; Future  Essential hypertension  Hyperlipidemia LDL goal <100    Recommendations for Preventive Services Due: see orders and patient instructions/AVS.  Recommended screening schedule for the next 5-10 years is provided to the patient in written form: see Patient Instructions/AVS.     No follow-ups on file. Subjective       Patient's complete Health Risk Assessment and screening values have been reviewed and are found in Flowsheets. The following problems were reviewed today and where indicated follow up appointments were made and/or referrals ordered. Positive Risk Factor Screenings with Interventions:     Cognitive:   Words recalled: 1 Word Recalled  Clock Drawing Test (CDT): (!) Abnormal  Total Score Interpretation: Abnormal Mini-Cog  Cognitive Impairment Interventions:  Patient declines any further evaluation/treatment for cognitive impairment            Health Habits/Nutrition:  Physical Activity: Sufficiently Active    Days of Exercise per Week: 3 days    Minutes of Exercise per Session: 150+ min     Have you lost any weight without trying in the past 3 months?: No  Body mass index: (!) 31.32  Have you seen the dentist within the past year?: Yes  Health Habits/Nutrition Interventions:  Maintain current exercise             Objective   Vitals:    08/26/22 0828   BP: 122/72   Site: Left Upper Arm   Position: Sitting   Pulse: 88   Resp: 16   SpO2: 96%   Weight: 176 lb 12.8 oz (80.2 kg)   Height: 5' 3\" (1.6 m)      Body mass index is 31.32 kg/m². Allergies   Allergen Reactions    Lisinopril Angioedema     Prior to Visit Medications    Medication Sig Taking?  Authorizing Provider   Multiple Vitamins-Minerals (PRESERVISION AREDS 2 PO) Take by mouth Yes Historical Provider, MD   aspirin 81 MG EC tablet Take by mouth daily Yes Ar Automatic Reconciliation   Biotin 2.5 MG TABS Take 5,000 mcg by mouth daily Yes Ar Automatic Reconciliation   Cholecalciferol 50 MCG (2000 UT) TABS Take by mouth Yes Ar Automatic Reconciliation   ezetimibe (ZETIA) 10 MG tablet Take 10 mg by mouth daily Yes Ar Automatic Reconciliation   hydrALAZINE (APRESOLINE) 50 MG tablet Take 50 mg by mouth 3 times daily Yes Ar Automatic Reconciliation   nitroGLYCERIN (NITROSTAT) 0.4 MG SL tablet Place 0.4 mg under the tongue Yes Ar Automatic Reconciliation   omeprazole (PRILOSEC) 20 MG delayed release capsule Take 20 mg by mouth daily as needed Yes Ar Automatic Reconciliation   simvastatin (ZOCOR) 40 MG tablet TAKE ONE TABLET BY MOUTH EVERY NIGHT Indications: high cholesterol Yes Ar Automatic Reconciliation   triamterene-hydroCHLOROthiazide (MAXZIDE) 75-50 MG per tablet Take 1 tablet by mouth daily Yes Ar Automatic Reconciliation       CareTeam (Including outside providers/suppliers regularly involved in providing care):   Patient Care Team:  Harry Ko DO as PCP - . Suhas Duran 25, DO as PCP - [facility] Empaneled Provider  Jeffery Mitchell OD (Optometry)     Reviewed and updated this visit:  Tobacco  Allergies  Meds  Problems  Med Hx  Surg Hx  Soc Hx  Fam Hx

## 2022-08-26 NOTE — PATIENT INSTRUCTIONS
Personalized Preventive Plan for Meme Spence - 8/26/2022  Medicare offers a range of preventive health benefits. Some of the tests and screenings are paid in full while other may be subject to a deductible, co-insurance, and/or copay. Some of these benefits include a comprehensive review of your medical history including lifestyle, illnesses that may run in your family, and various assessments and screenings as appropriate. After reviewing your medical record and screening and assessments performed today your provider may have ordered immunizations, labs, imaging, and/or referrals for you. A list of these orders (if applicable) as well as your Preventive Care list are included within your After Visit Summary for your review. Other Preventive Recommendations:    A preventive eye exam performed by an eye specialist is recommended every 1-2 years to screen for glaucoma; cataracts, macular degeneration, and other eye disorders. A preventive dental visit is recommended every 6 months. Try to get at least 150 minutes of exercise per week or 10,000 steps per day on a pedometer . Order or download the FREE \"Exercise & Physical Activity: Your Everyday Guide\" from [employer]. Call 8-385.222.3212 or search [employer] online. You need 0770-6671 mg of calcium and 3211-3005 IU of vitamin D per day. It is possible to meet your calcium requirement with diet alone, but a vitamin D supplement is usually necessary to meet this goal.  When exposed to the sun, use a sunscreen that protects against both UVA and UVB radiation with an SPF of 30 or greater. Reapply every 2 to 3 hours or after sweating, drying off with a towel, or swimming. Always wear a seat belt when traveling in a car. Always wear a helmet when riding a bicycle or motorcycle.

## 2022-08-27 LAB
ANION GAP SERPL CALC-SCNC: 9 MMOL/L (ref 7–16)
BUN SERPL-MCNC: 29 MG/DL (ref 8–23)
CALCIUM SERPL-MCNC: 9.3 MG/DL (ref 8.3–10.4)
CHLORIDE SERPL-SCNC: 103 MMOL/L (ref 98–107)
CO2 SERPL-SCNC: 24 MMOL/L (ref 21–32)
CREAT SERPL-MCNC: 2 MG/DL (ref 0.6–1)
FERRITIN SERPL-MCNC: 89 NG/ML (ref 8–388)
GLUCOSE SERPL-MCNC: 130 MG/DL (ref 65–100)
IRON SERPL-MCNC: 34 UG/DL (ref 35–150)
POTASSIUM SERPL-SCNC: 3.7 MMOL/L (ref 3.5–5.1)
SODIUM SERPL-SCNC: 136 MMOL/L (ref 136–145)
TRANSFERRIN SERPL-SCNC: 19.9 NMOL/L (ref 12.2–27.3)

## 2022-09-09 ENCOUNTER — HOSPITAL ENCOUNTER (OUTPATIENT)
Dept: ULTRASOUND IMAGING | Age: 77
Discharge: HOME | End: 2022-09-12
Payer: MEDICARE

## 2022-09-09 DIAGNOSIS — N17.9 ACUTE KIDNEY INJURY SUPERIMPOSED ON CHRONIC KIDNEY DISEASE (HCC): ICD-10-CM

## 2022-09-09 DIAGNOSIS — N18.9 ACUTE KIDNEY INJURY SUPERIMPOSED ON CHRONIC KIDNEY DISEASE (HCC): ICD-10-CM

## 2022-09-09 DIAGNOSIS — D53.9 MACROCYTIC ANEMIA: ICD-10-CM

## 2022-09-09 PROCEDURE — 76770 US EXAM ABDO BACK WALL COMP: CPT

## 2022-09-13 RX ORDER — TRIAMTERENE AND HYDROCHLOROTHIAZIDE 75; 50 MG/1; MG/1
TABLET ORAL
Qty: 90 TABLET | Refills: 1 | OUTPATIENT
Start: 2022-09-13

## 2022-09-13 RX ORDER — SIMVASTATIN 40 MG
TABLET ORAL
Qty: 90 TABLET | Refills: 0 | Status: SHIPPED | OUTPATIENT
Start: 2022-09-13

## 2022-09-13 NOTE — TELEPHONE ENCOUNTER
Called and spoke to pt. Advised to stop Maxide and scheduled follow up.   Pt stated she has appointment with Nephrology on 9/23/22

## 2022-09-13 NOTE — TELEPHONE ENCOUNTER
I am going to have her hold the [facility] for now based on the kidney function. Please have her schedule a 4 to 6-week follow-up for kidney function and referral follow-up.   Has she heard from the nephrologist?

## 2022-09-16 ENCOUNTER — OFFICE VISIT (OUTPATIENT)
Dept: CARDIOLOGY CLINIC | Age: 77
End: 2022-09-16
Payer: MEDICARE

## 2022-09-16 VITALS
DIASTOLIC BLOOD PRESSURE: 74 MMHG | BODY MASS INDEX: 30.65 KG/M2 | HEIGHT: 63 IN | HEART RATE: 74 BPM | WEIGHT: 173 LBS | SYSTOLIC BLOOD PRESSURE: 118 MMHG

## 2022-09-16 DIAGNOSIS — I10 ESSENTIAL (PRIMARY) HYPERTENSION: Primary | ICD-10-CM

## 2022-09-16 DIAGNOSIS — I49.5 SICK SINUS SYNDROME (HCC): ICD-10-CM

## 2022-09-16 DIAGNOSIS — D64.9 ANEMIA, UNSPECIFIED TYPE: ICD-10-CM

## 2022-09-16 PROCEDURE — 99214 OFFICE O/P EST MOD 30 MIN: CPT | Performed by: INTERNAL MEDICINE

## 2022-09-16 PROCEDURE — 93000 ELECTROCARDIOGRAM COMPLETE: CPT | Performed by: INTERNAL MEDICINE

## 2022-09-16 PROCEDURE — G8417 CALC BMI ABV UP PARAM F/U: HCPCS | Performed by: INTERNAL MEDICINE

## 2022-09-16 PROCEDURE — G8428 CUR MEDS NOT DOCUMENT: HCPCS | Performed by: INTERNAL MEDICINE

## 2022-09-16 PROCEDURE — 1036F TOBACCO NON-USER: CPT | Performed by: INTERNAL MEDICINE

## 2022-09-16 PROCEDURE — G8400 PT W/DXA NO RESULTS DOC: HCPCS | Performed by: INTERNAL MEDICINE

## 2022-09-16 PROCEDURE — 1123F ACP DISCUSS/DSCN MKR DOCD: CPT | Performed by: INTERNAL MEDICINE

## 2022-09-16 PROCEDURE — 1090F PRES/ABSN URINE INCON ASSESS: CPT | Performed by: INTERNAL MEDICINE

## 2022-09-16 RX ORDER — HYDRALAZINE HYDROCHLORIDE 25 MG/1
25 TABLET, FILM COATED ORAL 3 TIMES DAILY
Qty: 270 TABLET | Refills: 3 | Status: SHIPPED | OUTPATIENT
Start: 2022-09-16

## 2022-09-16 NOTE — PROGRESS NOTES
[address] [facility], [address] [place] [address], [address] [place] [address], [address] [address]  PHONE: [phone]    SUBJECTIVE:   Consuelo Abbasi is a 65 y.o. female 1945   seen for a follow up visit regarding the following:     Chief Complaint   Patient presents with    Hypertension     yearly         History of present illness: 65 y.o. female presented for follow-up 9/16/22 recent noted anemia as well as acute kidney injury. Patient was notified by her primary care physician to stop Maxide therapy. Patient inadvertently has stopped hydralazine and continued Maxide therapy. No significant symptoms today but occasional shortness of breath. Interval HX   She has a previous history of hypertension, as well as spontaneous pneumothorax that was evaluated in 2019. 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 At that time, she was taken off Diltiazem therapy. No recent syncope, presyncope or any other issue. She presented for followup 08/07/2020. She was previously seen by Dr. Aravind Greene. Cardiac History:      Spontaneous pneumothorax. 2016 stress perfusion imaging with normal perfusion. 2018 ECG was reviewed with sinus arrest with underlying escape rhythm (taken off Diltiazem at that time). Atrial fibrillation in the patient's medical record, but erroneous diagnosis due to ECG interpreted as atrial fibrillation in 2018, reviewed. History of anticoagulation therapy with Eliquis. Discontinued due to absence of atrial fibrillation and subsequent workup. 2019 echocardiogram - normal left ventricular systolic function, normal left ventricular diastolic function noted. No major valvular disease. 08/07/2020 ECG reviewed, sinus rhythm, low voltage in precordial leads, poor R-wave progression. Sinus  Rhythm Left bundle branch block.  ABNORMAL      Assessment and Plan:    Acute kidney injury  Work-up ongoing  We had a discussion today regarding medication changes  I reiterated that Maxide therapy should be discontinued we continued hydralazine at lower dose for blood pressure management  Discussed avoiding nonsteroidal anti-inflammatory medications also in light of anemia described below  Anemia  Referral made to gastroenterology    History of conduction disease noted. The patient was taken off calcium channel blocker therapy. Discussed indications for device placement. No class 1 or class 2 indications  at this time. LBBB no sx      Hyperlipidemia. Current Outpatient Medications   Medication Sig    simvastatin (ZOCOR) 40 MG tablet TAKE ONE TABLET BY MOUTH EVERY NIGHT    Multiple Vitamins-Minerals (PRESERVISION AREDS 2 PO) Take by mouth    aspirin 81 MG EC tablet Take by mouth daily    Biotin 2.5 MG TABS Take 5,000 mcg by mouth daily    Cholecalciferol 50 MCG (2000 UT) TABS Take by mouth    ezetimibe (ZETIA) 10 MG tablet Take 10 mg by mouth daily    nitroGLYCERIN (NITROSTAT) 0.4 MG SL tablet Place 0.4 mg under the tongue    omeprazole (PRILOSEC) 20 MG delayed release capsule Take 20 mg by mouth daily as needed    triamterene-hydroCHLOROthiazide (MAXZIDE) 75-50 MG per tablet Take 1 tablet by mouth daily     No current facility-administered medications for this visit. Past Medical History, Past Surgical History, Family history, Social History, and Medications were all reviewed with the patient today and updated as necessary.        Allergies   Allergen Reactions    Lisinopril Angioedema     Past Medical History:   Diagnosis Date    CKD (chronic kidney disease) stage 3, GFR 30-59 ml/min ([facility])     Essential hypertension     Hyperlipidemia LDL goal <100     Menopause     S/P colonoscopy 2017 2022 to 2027    Spontaneous pneumothorax 2019    SSS (sick sinus syndrome) ([facility] [address]      Past Surgical History:   Procedure Laterality Date    BREAST BIOPSY Right     BREAST BIOPSY Left     COLONOSCOPY      HYSTERECTOMY, 08/26/22 10:03 AM   Result Value Ref Range    Sodium 136 136 - 145 mmol/L    Potassium 3.7 3.5 - 5.1 mmol/L    Chloride 103 98 - 107 mmol/L    CO2 24 21 - 32 mmol/L    Anion Gap 9 7 - 16 mmol/L    Glucose 130 (H) 65 - 100 mg/dL    BUN 29 (H) 8 - 23 MG/DL    Creatinine 2.00 (H) 0.6 - 1.0 MG/DL    GFR  31 (L) >60 ml/min/1.73m2    GFR Non- 26 (L) >60 ml/min/1.73m2    Calcium 9.3 8.3 - 10.4 MG/DL   Soluble transferrin receptor    Collection Time: 08/26/22 10:03 AM   Result Value Ref Range    Soluble Transferrin Recept 19.9 12.2 - 27.3 nmol/L   Iron    Collection Time: 08/26/22 10:03 AM   Result Value Ref Range    Iron 34 (L) 35 - 150 ug/dL   Ferritin    Collection Time: 08/26/22 10:03 AM   Result Value Ref Range    Ferritin 89 8 - 388 NG/ML   CBC with Auto Differential    Collection Time: 08/26/22 10:03 AM   Result Value Ref Range    WBC 5.3 4.3 - 11.1 K/uL    RBC 2.73 (L) 4.05 - 5.2 M/uL    Hemoglobin 8.3 (L) 11.7 - 15.4 g/dL    Hematocrit 27.7 (L) 35.8 - 46.3 %    .5 (H) 79.6 - 97.8 FL    MCH 30.4 26.1 - 32.9 PG    MCHC 30.0 (L) 31.4 - 35.0 g/dL    RDW 13.8 11.9 - 14.6 %    Platelets 376 736 - 634 K/uL    MPV 12.3 9.4 - 12.3 FL    nRBC 0.00 0.0 - 0.2 K/uL    Differential Type AUTOMATED      Seg Neutrophils 51 43 - 78 %    Lymphocytes 40 13 - 44 %    Monocytes 6 4.0 - 12.0 %    Eosinophils % 2 0.5 - 7.8 %    Basophils 1 0.0 - 2.0 %    Immature Granulocytes 0 0.0 - 5.0 %    Segs Absolute 2.7 1.7 - 8.2 K/UL    Absolute Lymph # 2.1 0.5 - 4.6 K/UL    Absolute Mono # 0.3 0.1 - 1.3 K/UL    Absolute Eos # 0.1 0.0 - 0.8 K/UL    Basophils Absolute 0.1 0.0 - 0.2 K/UL    Absolute Immature Granulocyte 0.0 0.0 - 0.5 K/UL   Transthoracic echocardiogram (TTE) complete with contrast, bubble, strain, and 3D PRN    Collection Time: 08/31/22 12:25 PM   Result Value Ref Range    IVSd 1.0 (A) 0.6 - 0.9 cm    LVIDd 5.0 3.9 - 5.3 cm    LVIDs 3.6 cm    LVOT Diameter 2.0 cm    LVPWd 0.7 0.6 - 0.9 cm    EF BP 60 55 - 100 %    LV Ejection Fraction A2C 63 %    LV Ejection Fraction A4C 55 %    LV EDV A2C 103 mL    LV EDV A4C 119 mL    LV EDV  (A) 56 - 104 mL    LV ESV A2C 38 mL    LV ESV A4C 54 mL    LV ESV BP 46 19 - 49 mL    LVOT Peak Gradient 4 mmHg    LVOT Mean Gradient 2 mmHg    LVOT SV 65.9 ml    LVOT Peak Velocity 1.0 m/s    LVOT VTI 21.0 cm    RVIDd 3.2 cm    RVSP 29 mmHg    LA Diameter 4.2 cm    LA Volume A/L 64 mL    LA Volume 2C 56 (A) 22 - 52 mL    LA Volume 4C 63 (A) 22 - 52 mL    LA Volume BP 60 (A) 22 - 52 mL    Est. RA Pressure 3 mmHg    MV A Velocity 0.86 m/s    MV E Wave Deceleration Time 190.5 ms    MV E Velocity 0.84 m/s    LV E' Lateral Velocity 9 cm/s    LV E' Septal Velocity 7 cm/s    MR Peak Gradient 74 mmHg    MR Peak Velocity 4.3 m/s    TAPSE 2.1 1.7 cm    TR Peak Gradient 26 mmHg    TR Max Velocity 2.53 m/s    Body Surface Area 1.88 m2    Fractional Shortening 2D 28 28 - 44 %    LV ESV Index BP 25 mL/m2    LV EDV Index BP 63 mL/m2    LV ESV Index A4C 30 mL/m2    LV EDV Index A4C 65 mL/m2    LV ESV Index A2C 21 mL/m2    LV EDV Index A2C 56 mL/m2    LVIDd Index 2.73 cm/m2    LVIDs Index 1.97 cm/m2    LV RWT Ratio 0.28     LV Mass 2D 146.8 67 - 162 g    LV Mass 2D Index 80.2 43 - 95 g/m2    MV E/A 0.98     E/E' Ratio (Averaged) 10.67     E/E' Lateral 9.33     E/E' Septal 12.00     LA Volume Index BP 33 16 - 34 ml/m2    LA Volume Index A/L 35 16 - 34 mL/m2    LVOT Stroke Volume Index 36.0 mL/m2    LVOT Area 3.1 cm2    LA Volume Index 2C 31 16 - 34 mL/m2    LA Volume Index 4C 34 16 - 34 mL/m2    LA Size Index 2.30 cm/m2    RV Basal Dimension 3.8 cm    RV Mid Dimension 3.5 cm     Lab Results   Component Value Date/Time    CHOL 178 08/19/2022 10:43 AM    HDL 45 08/19/2022 10:43 AM    VLDL 30 02/18/2022 08:54 AM       No results found for any visits on 09/16/22. Helio Monroy was seen today for hypertension.     Diagnoses and all orders for this visit:    Essential (primary) hypertension  - EKG 12 Lead    Sick sinus syndrome (HCC)    Anemia, unspecified type  -     AFL - Terri Rivas MD, Gastroenterology    Other orders  -     hydrALAZINE (APRESOLINE) 25 MG tablet; Take 1 tablet by mouth 3 times daily    Return in about 1 year (around 9/16/2023).        Lacey Carbajal MD  9/16/2022  4:55 PM

## 2022-09-17 ASSESSMENT — ENCOUNTER SYMPTOMS
EYE PAIN: 0
STRIDOR: 0
ABDOMINAL PAIN: 0
NAIL CHANGES: 0
APHONIA: 0
COUGH: 0

## 2022-09-22 NOTE — PROGRESS NOTES
[facility] Hematology and Oncology: Office Visit New Patient H & P    Reason for visit:  Macrocytic anemia    History of Present Illness:  Ms. Leela Luque is a 65years old female patient with history of hypertension, hyperlipidemia, chronic kidney disease, colon polyps, total abdominal hysterectomy with bilateral salpingo-oophorectomy and spontaneous pneumothorax who has been referred for evaluation of microcytic anemia. On 8/26/202022, she presented to her PCP with complaints of fatigue and shortness of breath with activity. Hemoglobin was down to 8.3 from 11 (in March 2022). On evaluation today, patient reports moderate persistent fatigue and exertional dyspnea with onset a few months ago. She denies fevers, night sweats, chest pain, bloody/black stools, nausea, vomiting, change in appetite, heartburn, headache, gait disturbances, tingling/numbness or weakness in any part of her body. She denies any swollen glands easy bleeding or bruising. She has intentionally lost about 24 pounds over the course of last few months by following healthier eating habits. She is scheduled to undergo EGD and colonoscopy on 10/19/2022. She is accompanied by her daughter during this visit. Review of Systems:  14 point ROS was negative except as mentioned above. ECOG PERFORMANCE STATUS - 1- Restricted in physically strenuous activity but ambulatory and able to carry out work of a light or sedentary nature such as light house work, office work. Pain - /10. None/Minimal pain - not affecting QOL     Fatigue - No flowsheet data found. Distress - No flowsheet data found.          Reviewed and updated this visit by provider:          Current Outpatient Medications   Medication Sig Dispense Refill    hydrALAZINE (APRESOLINE) 25 MG tablet Take 1 tablet by mouth 3 times daily 270 tablet 3    simvastatin (ZOCOR) 40 MG tablet TAKE ONE TABLET BY MOUTH EVERY NIGHT 90 tablet 0    aspirin 81 MG EC tablet Take by mouth daily Biotin 2.5 MG TABS Take 5,000 mcg by mouth daily      Cholecalciferol 50 MCG (2000 UT) TABS Take by mouth      ezetimibe (ZETIA) 10 MG tablet Take 10 mg by mouth daily      nitroGLYCERIN (NITROSTAT) 0.4 MG SL tablet Place 0.4 mg under the tongue      omeprazole (PRILOSEC) 20 MG delayed release capsule Take 20 mg by mouth daily as needed      Multiple Vitamins-Minerals (PRESERVISION AREDS 2 PO) Take by mouth (Patient not taking: Reported on 9/23/2022)       No current facility-administered medications for this visit.         OBJECTIVE:  /61 (Site: Right Upper Arm, Position: Sitting, Cuff Size: Medium Adult)   Pulse 79   Temp 98.1 °F (36.7 °C) (Oral)   Resp 22   Ht 5' 2\" (1.575 m) Comment: taken w/o shoes  Wt 177 lb 12.8 oz (80.6 kg)   SpO2 98%   BMI 32.52 kg/m²     Physical Exam:  Patient alert and oriented x 3, in no acute distress  Integumentary: No Pallor, no icterus  HEENT: Moist mucous membranes, normal oropharynx  Lymph nodes: No cervical or axillary lymphadenopathy  Cardiovascular:RRR, S1, S2 present, no m/r/g   Lungs: Clear to auscultation, no rales or wheezing, no accessory muscle use  Abdomen: Soft, and non-tender on palpation, no organomegaly, bowel sounds audible  Extremities: No peripheral edema, no joint swelling  Neurological: patient can follow commands and move all extremities    Labs:  Lab Results   Component Value Date    WBC 5.3 08/26/2022    HGB 8.3 (L) 08/26/2022    HCT 27.7 (L) 08/26/2022    .5 (H) 08/26/2022     08/26/2022       Lab Results   Component Value Date    NEUTROABS 2.0 02/18/2022    LYMPHOPCT 40 08/26/2022    LYMPHSABS 2.1 08/26/2022    MONOPCT 6 08/26/2022    MONOSABS 0.3 08/26/2022    EOSABS 0.1 08/26/2022    BASOPCT 1 08/26/2022       Lab Results   Component Value Date     08/26/2022    K 3.7 08/26/2022     08/26/2022    CO2 24 08/26/2022    BUN 29 (H) 08/26/2022    CREATININE 2.00 (H) 08/26/2022    GLUCOSE 130 (H) 08/26/2022    CALCIUM 9.3 08/26/2022    PROT 8.2 08/19/2022    LABALBU 3.9 08/19/2022    BILITOT 0.6 08/19/2022    ALKPHOS 70 08/19/2022    AST 23 08/19/2022    ALT 14 08/19/2022    LABGLOM 26 (L) 08/26/2022    GFRAA 31 (L) 08/26/2022    AGRATIO 1.3 02/18/2022    GLOB 4.3 (H) 08/19/2022           Imaging:  US RETROPERITONEAL COMPLETE    Result Date: 9/9/2022  RENAL ULTRASOUND. INDICATION: Chronic renal failure. COMPARISON: CT scan 2014. TECHNIQUE: Direct skin contact sector 3-5 MHz images of the kidneys are obtained. FINDINGS: Renal echogenicity borderline, right kidney is isoechoic or mildly hyperechoic compared to the liver. Right kidney: 9.5 cm in length. No hydronephrosis. Left kidney: 10.1 cm in length. No hydronephrosis. Urinary bladder: Unremarkable Vasculature: Aorta: Normal caliber. IVC:  Patent. Negative for obstruction. Borderline renal echogenicity. Transthoracic echocardiogram (TTE) complete with contrast, bubble, strain, and 3D PRN    Result Date: 9/6/2022  Formatting of this result is different from the original.   Left Ventricle: Normal left ventricular systolic function with a visually estimated EF of 55 - 60%. Left ventricle size is normal. Mildly increased wall thickness. Normal wall motion. Abnormal diastolic function. Aortic Valve: Mild sclerosis of the aortic valve cusp. Mitral Valve: Mild regurgitation. Left Atrium: Left atrium is moderately dilated. LA Vol Index A/L is 35 mL/m2. Problems:  1. Iron deficiency anemia, unspecified iron deficiency anemia type    2. Stage 3 chronic kidney disease, unspecified whether stage 3a or 3b CKD ([address]    3. Other specified metabolic disorders ([address]     4. Other general symptoms and signs     5. Macrocytosis    History of colonic polyps. PLAN:    Discussed the patients finding of anemia which is macrocytic.   Possible etiologies include iron deficiency anemia due to chronic blood loss, anemia of inflammation, or primary bone marrow disorder such as myelodysplastic syndrome. We will proceed with diagnostic testing as follows. -CBC, CMP,FOBT, Retic  - CRP, ESR  -Iron studies, soluble transferrin receptor  - homocysteine, MMA  - LDH, Haptoglobin  - SPEP/UPEP/FLC  - TSH/Free T4  -She is scheduled to see GI for endoscopy evaluation in October.  -Discussed the role of bone marrow biopsy and diagnostic work-up microcytic anemia. All questions were answered to the best of my abilities. Patient verbalized understanding of the management plan. Return office visit after endoscopy evaluation, with labs as ordered. Patricio Hanna MD  [facility] Hematology and Oncology  [address] [place] [address]  Office : [phone]  Fax : [phone]    Elements of this note have been dictated using speech recognition software. As a result, errors of speech recognition may have occurred.

## 2022-09-22 NOTE — PROGRESS NOTES
Leo Obrien Abstract      Reason for Referral:  Macrocytic anemia    Referring Provider:  Dr. Kera Garcia, [facility]     Primary Care Provider:  Dr. Kera Garcia, [facility]    Family History of Cancer/Hematologic Disorders:  Negative     Presenting Symptoms:   Fatigue and shortness of breath    Narrative with recent with Results/Procedures/Biopsies and Dates completed:  Ms. Leo Obrien is a 42-year-old  female with a medical history of HTN, hyperlipidemia, CKD, sick sinus syndrome, colon polyps, and spontaneous pneumothorax. Surgical history includes bilateral breast biopsies, left lower lobectomy, and MICHAEL w/BSO. Family history of heart disease. She denies use of any tobacco products or drugs but does consume alcohol occasionally. On 8/26/22 she was seen in routine follow up with PCP. She was reporting fatigue and some shortness of breath with activity. Hgb was down to 8.3 from 11.0 back in March 2022. She denied any bleeding, dark stools, hematemesis, etc.  Iron studies were ordered which revealed an iron level mildly low at 34. Also, some renal insufficiency was noted. Referral to nephrology and hematology for further evaluation and recommendations. On 9/16/22 she was seen in routine follow up with cardiology. Referral to GI for anemia. Last colonoscopy was 2017, due for repeat screening 2022 with history of colon polyps. On 9/21/22 she was seen by Dr. Neelima Lechuga, [facility], and recommendation was to arrange EGD/colonoscopy. 8/2022 [facility]                      5/23/2017 Colonoscopy         Notes from Referring Provider: 9/12/22 progress note Dr. Savannah Torre, [facility]      Other Pertinent Information:  Covid vaccination - 1/29/21 and 2/26/21    Presented at Tumor Board:   No

## 2022-09-23 ENCOUNTER — HOSPITAL ENCOUNTER (OUTPATIENT)
Dept: LAB | Age: 77
Discharge: HOME | End: 2022-09-26
Payer: MEDICARE

## 2022-09-23 ENCOUNTER — TELEPHONE (OUTPATIENT)
Dept: ONCOLOGY | Age: 77
End: 2022-09-23

## 2022-09-23 ENCOUNTER — OFFICE VISIT (OUTPATIENT)
Dept: ONCOLOGY | Age: 77
End: 2022-09-23
Payer: MEDICARE

## 2022-09-23 ENCOUNTER — TELEPHONE (OUTPATIENT)
Dept: FAMILY MEDICINE CLINIC | Facility: CLINIC | Age: 77
End: 2022-09-23

## 2022-09-23 VITALS
DIASTOLIC BLOOD PRESSURE: 61 MMHG | HEART RATE: 79 BPM | BODY MASS INDEX: 32.72 KG/M2 | TEMPERATURE: 98.1 F | HEIGHT: 62 IN | SYSTOLIC BLOOD PRESSURE: 127 MMHG | WEIGHT: 177.8 LBS | OXYGEN SATURATION: 98 % | RESPIRATION RATE: 22 BRPM

## 2022-09-23 DIAGNOSIS — E88.89 OTHER SPECIFIED METABOLIC DISORDERS (HCC): ICD-10-CM

## 2022-09-23 DIAGNOSIS — N18.30 STAGE 3 CHRONIC KIDNEY DISEASE, UNSPECIFIED WHETHER STAGE 3A OR 3B CKD (HCC): ICD-10-CM

## 2022-09-23 DIAGNOSIS — D50.9 IRON DEFICIENCY ANEMIA, UNSPECIFIED IRON DEFICIENCY ANEMIA TYPE: Primary | ICD-10-CM

## 2022-09-23 DIAGNOSIS — R68.89 OTHER GENERAL SYMPTOMS AND SIGNS: ICD-10-CM

## 2022-09-23 DIAGNOSIS — D50.9 IRON DEFICIENCY ANEMIA, UNSPECIFIED IRON DEFICIENCY ANEMIA TYPE: ICD-10-CM

## 2022-09-23 DIAGNOSIS — D75.89 MACROCYTOSIS: ICD-10-CM

## 2022-09-23 LAB
ALBUMIN SERPL-MCNC: 3.1 G/DL (ref 3.2–4.6)
ALBUMIN/GLOB SERPL: 0.6 {RATIO} (ref 1.2–3.5)
ALP SERPL-CCNC: 66 U/L (ref 50–136)
ALT SERPL-CCNC: 11 U/L (ref 12–65)
ANION GAP SERPL CALC-SCNC: 7 MMOL/L (ref 4–13)
AST SERPL-CCNC: 16 U/L (ref 15–37)
BASOPHILS # BLD: 0 K/UL (ref 0–0.2)
BASOPHILS NFR BLD: 1 % (ref 0–2)
BILIRUB SERPL-MCNC: 0.3 MG/DL (ref 0.2–1.1)
BUN SERPL-MCNC: 16 MG/DL (ref 8–23)
CALCIUM SERPL-MCNC: 9 MG/DL (ref 8.3–10.4)
CHLORIDE SERPL-SCNC: 107 MMOL/L (ref 101–110)
CO2 SERPL-SCNC: 25 MMOL/L (ref 21–32)
CREAT SERPL-MCNC: 1.4 MG/DL (ref 0.6–1)
CRP SERPL-MCNC: 6.5 MG/DL (ref 0–0.9)
DIFFERENTIAL METHOD BLD: ABNORMAL
EOSINOPHIL # BLD: 0.1 K/UL (ref 0–0.8)
EOSINOPHIL NFR BLD: 2 % (ref 0.5–7.8)
ERYTHROCYTE [DISTWIDTH] IN BLOOD BY AUTOMATED COUNT: 13.9 % (ref 11.9–14.6)
ERYTHROCYTE [SEDIMENTATION RATE] IN BLOOD: 13 MM/HR (ref 0–30)
FERRITIN SERPL-MCNC: 89 NG/ML (ref 8–388)
GLOBULIN SER CALC-MCNC: 4.8 G/DL (ref 2.3–3.5)
GLUCOSE SERPL-MCNC: 90 MG/DL (ref 65–100)
HAPTOGLOB SERPL-MCNC: 280 MG/DL (ref 30–200)
HCT VFR BLD AUTO: 25.1 % (ref 35.8–46.3)
HGB BLD-MCNC: 7.6 G/DL (ref 11.7–15.4)
HGB RETIC QN AUTO: 28 PG (ref 29–35)
IMM GRANULOCYTES # BLD AUTO: 0 K/UL (ref 0–0.5)
IMM GRANULOCYTES NFR BLD AUTO: 1 % (ref 0–5)
IMM RETICS NFR: 20.6 % (ref 3–15.9)
IRON SATN MFR SERPL: 11 %
IRON SERPL-MCNC: 28 UG/DL (ref 35–150)
LDH SERPL L TO P-CCNC: 206 U/L (ref 110–210)
LYMPHOCYTES # BLD: 1.3 K/UL (ref 0.5–4.6)
LYMPHOCYTES NFR BLD: 31 % (ref 13–44)
MCH RBC QN AUTO: 29.6 PG (ref 26.1–32.9)
MCHC RBC AUTO-ENTMCNC: 30.3 G/DL (ref 31.4–35)
MCV RBC AUTO: 97.7 FL (ref 79.6–97.8)
MONOCYTES # BLD: 0.2 K/UL (ref 0.1–1.3)
MONOCYTES NFR BLD: 5 % (ref 4–12)
NEUTS SEG # BLD: 2.6 K/UL (ref 1.7–8.2)
NEUTS SEG NFR BLD: 60 % (ref 43–78)
NRBC # BLD: 0 K/UL (ref 0–0.2)
PLATELET # BLD AUTO: 287 K/UL (ref 150–450)
PMV BLD AUTO: 9.4 FL (ref 9.4–12.3)
POTASSIUM SERPL-SCNC: 3.2 MMOL/L (ref 3.5–5.1)
PROT SERPL-MCNC: 7.9 G/DL (ref 6.3–8.2)
RBC # BLD AUTO: 2.57 M/UL (ref 4.05–5.2)
RETICS # AUTO: 0.06 M/UL (ref 0.03–0.1)
RETICS/RBC NFR AUTO: 2.2 % (ref 0.3–2)
SODIUM SERPL-SCNC: 139 MMOL/L (ref 136–145)
T4 FREE SERPL-MCNC: 0.6 NG/DL (ref 0.78–1.4)
TIBC SERPL-MCNC: 248 UG/DL (ref 250–450)
TSH, 3RD GENERATION: 1.06 UIU/ML (ref 0.36–3)
WBC # BLD AUTO: 4.4 K/UL (ref 4.3–11.1)

## 2022-09-23 PROCEDURE — 83090 ASSAY OF HOMOCYSTEINE: CPT

## 2022-09-23 PROCEDURE — 83615 LACTATE (LD) (LDH) ENZYME: CPT

## 2022-09-23 PROCEDURE — 83521 IG LIGHT CHAINS FREE EACH: CPT

## 2022-09-23 PROCEDURE — 83921 ORGANIC ACID SINGLE QUANT: CPT

## 2022-09-23 PROCEDURE — 85046 RETICYTE/HGB CONCENTRATE: CPT

## 2022-09-23 PROCEDURE — 82784 ASSAY IGA/IGD/IGG/IGM EACH: CPT

## 2022-09-23 PROCEDURE — 36415 COLL VENOUS BLD VENIPUNCTURE: CPT

## 2022-09-23 PROCEDURE — 84443 ASSAY THYROID STIM HORMONE: CPT

## 2022-09-23 PROCEDURE — 83010 ASSAY OF HAPTOGLOBIN QUANT: CPT

## 2022-09-23 PROCEDURE — 84156 ASSAY OF PROTEIN URINE: CPT

## 2022-09-23 PROCEDURE — 84439 ASSAY OF FREE THYROXINE: CPT

## 2022-09-23 PROCEDURE — 85025 COMPLETE CBC W/AUTO DIFF WBC: CPT

## 2022-09-23 PROCEDURE — 1123F ACP DISCUSS/DSCN MKR DOCD: CPT | Performed by: INTERNAL MEDICINE

## 2022-09-23 PROCEDURE — 99204 OFFICE O/P NEW MOD 45 MIN: CPT | Performed by: INTERNAL MEDICINE

## 2022-09-23 PROCEDURE — 82728 ASSAY OF FERRITIN: CPT

## 2022-09-23 PROCEDURE — 83540 ASSAY OF IRON: CPT

## 2022-09-23 PROCEDURE — 85652 RBC SED RATE AUTOMATED: CPT

## 2022-09-23 PROCEDURE — 86140 C-REACTIVE PROTEIN: CPT

## 2022-09-23 ASSESSMENT — PATIENT HEALTH QUESTIONNAIRE - PHQ9
SUM OF ALL RESPONSES TO PHQ QUESTIONS 1-9: 0
SUM OF ALL RESPONSES TO PHQ QUESTIONS 1-9: 0
2. FEELING DOWN, DEPRESSED OR HOPELESS: 0
SUM OF ALL RESPONSES TO PHQ QUESTIONS 1-9: 0
SUM OF ALL RESPONSES TO PHQ9 QUESTIONS 1 & 2: 0
SUM OF ALL RESPONSES TO PHQ QUESTIONS 1-9: 0
2. FEELING DOWN, DEPRESSED OR HOPELESS: 0
1. LITTLE INTEREST OR PLEASURE IN DOING THINGS: 0
SUM OF ALL RESPONSES TO PHQ QUESTIONS 1-9: 0
SUM OF ALL RESPONSES TO PHQ QUESTIONS 1-9: 0

## 2022-09-23 NOTE — PATIENT INSTRUCTIONS
Patient Instructions from Today's Visit    Reason for Visit:  Follow up - Anemia     Diagnosis Information:  [email or web address]    Plan: We are seeing you today for anemia. The size of your red blood cells are also larger than normal.   You will need additional lab work drawn today to further evaluate this. We recommend that you proceed with EGD and colonoscopy. Start taking over the counter oral iron daily - you should take this on an empty stomach. Depending on the results of your labs, we may need to bone marrow biopsy in the future. Please call us if you have any questions or concerns before your next visit. Follow Up: Follow up in October with Dr. Katerine Hernandez, with labs prior. Recent Lab Results:  No visits with results within 3 Day(s) from this visit. Latest known visit with results is:    Ancillary Procedure on 08/31/2022   Component Date Value Ref Range Status    IVSd 08/31/2022 1.0 (A) 0.6 - 0.9 cm Final    LVIDd 08/31/2022 5.0  3.9 - 5.3 cm Final    LVIDs 08/31/2022 3.6  cm Final    LVOT Diameter 08/31/2022 2.0  cm Final    LVPWd 08/31/2022 0.7  0.6 - 0.9 cm Final    EF BP 08/31/2022 60  55 - 100 % Final    LV Ejection Fraction A2C 08/31/2022 63  % Final    LV Ejection Fraction A4C 08/31/2022 55  % Final    LV EDV A2C 08/31/2022 103  mL Final    LV EDV A4C 08/31/2022 119  mL Final    LV EDV BP 08/31/2022 115 (A) 56 - 104 mL Final    LV ESV A2C 08/31/2022 38  mL Final    LV ESV A4C 08/31/2022 54  mL Final    LV ESV BP 08/31/2022 46  19 - 49 mL Final    LVOT Peak Gradient 08/31/2022 4  mmHg Final    LVOT Mean Gradient 08/31/2022 2  mmHg Final    LVOT SV 08/31/2022 65.9  ml Final    LVOT Peak Velocity 08/31/2022 1.0  m/s Final    LVOT VTI 08/31/2022 21.0  cm Final    RVIDd 08/31/2022 3.2  cm Final    RVSP 08/31/2022 29  mmHg Final    LA Diameter 08/31/2022 4.2  cm Final    LA Volume A/L 08/31/2022 64  mL Final    LA Volume 2C 08/31/2022 56 (A) 22 - 52 mL Final    LA Volume 4C 08/31/2022 63 (A) 22 - 52 mL Final    LA Volume BP 08/31/2022 60 (A) 22 - 52 mL Final    Est. RA Pressure 08/31/2022 3  mmHg Final    MV A Velocity 08/31/2022 0.86  m/s Final    MV E Wave Deceleration Time 08/31/2022 190.5  ms Final    MV E Velocity 08/31/2022 0.84  m/s Final    LV E' Lateral Velocity 08/31/2022 9  cm/s Final    LV E' Septal Velocity 08/31/2022 7  cm/s Final    MR Peak Gradient 08/31/2022 74  mmHg Final    MR Peak Velocity 08/31/2022 4.3  m/s Final    TAPSE 08/31/2022 2.1  1.7 cm Final    TR Peak Gradient 08/31/2022 26  mmHg Final    TR Max Velocity 08/31/2022 2.53  m/s Final    Body Surface Area 08/31/2022 1.88  m2 Final    Fractional Shortening 2D 08/31/2022 28  28 - 44 % Final    LV ESV Index BP 08/31/2022 25  mL/m2 Final    LV EDV Index BP 08/31/2022 63  mL/m2 Final    LV ESV Index A4C 08/31/2022 30  mL/m2 Final    LV EDV Index A4C 08/31/2022 65  mL/m2 Final    LV ESV Index A2C 08/31/2022 21  mL/m2 Final    LV EDV Index A2C 08/31/2022 56  mL/m2 Final    LVIDd Index 08/31/2022 2.73  cm/m2 Final    LVIDs Index 08/31/2022 1.97  cm/m2 Final    LV RWT Ratio 08/31/2022 0.28   Final    LV Mass 2D 08/31/2022 146.8  67 - 162 g Final    LV Mass 2D Index 08/31/2022 80.2  43 - 95 g/m2 Final    MV E/A 08/31/2022 0.98   Final    E/E' Ratio (Averaged) 08/31/2022 10.67   Final    E/E' Lateral 08/31/2022 9.33   Final    E/E' Septal 08/31/2022 12.00   Final    LA Volume Index BP 08/31/2022 33  16 - 34 ml/m2 Final    LA Volume Index A/L 08/31/2022 35  16 - 34 mL/m2 Final    LVOT Stroke Volume Index 08/31/2022 36.0  mL/m2 Final    LVOT Area 08/31/2022 3.1  cm2 Final    LA Volume Index 2C 08/31/2022 31  16 - 34 mL/m2 Final    LA Volume Index 4C 08/31/2022 34  16 - 34 mL/m2 Final    LA Size Index 08/31/2022 2.30  cm/m2 Final    RV Basal Dimension 08/31/2022 3.8  cm Final    RV Mid Dimension 08/31/2022 3.5  cm Final         Treatment Summary has been discussed and given to patient: N/A        -------------------------------------------------------------------------------------------------------------------  Please call our office at [phone] if you have any  of the following symptoms:   Fever of 100.5 or greater  Chills  Shortness of breath  Swelling or pain in one leg    After office hours an answering service is available and will contact a provider for emergencies or if you are experiencing any of the above symptoms. Patient does express an interest in My Chart. My Chart log in information explained on the after visit summary printout at the [facility] [address] desk.     Jean-Claude Khan RN

## 2022-09-23 NOTE — TELEPHONE ENCOUNTER
Notified pt that potassium level is a little low and that thyroid level is off. Instructed to follow up with primary care physician. Pt verbalized understanding of instruction.

## 2022-09-23 NOTE — TELEPHONE ENCOUNTER
Pt called and stated she saw Hematology today and was advised to contact Dr. Lynda Mclaughlin to have her to review her labs from the Hematology.

## 2022-09-24 LAB — HCYS SERPL-SCNC: 24.4 UMOL/L (ref 0–19.2)

## 2022-09-26 ENCOUNTER — TELEPHONE (OUTPATIENT)
Dept: ONCOLOGY | Age: 77
End: 2022-09-26

## 2022-09-26 DIAGNOSIS — D50.9 IRON DEFICIENCY ANEMIA, UNSPECIFIED IRON DEFICIENCY ANEMIA TYPE: Primary | ICD-10-CM

## 2022-09-26 LAB
ALBUMIN MFR UR ELPH: 44.6 %
ALPHA1 GLOB MFR UR ELPH: 8.5 %
ALPHA2 GLOB 24H MFR UR ELPH: 8.1 %
B-GLOBULIN 24H MFR UR ELPH: 17.9 %
GAMMA GLOB 24H MFR UR ELPH: 20.8 %
KAPPA LC FREE SER-MCNC: 98.5 MG/L (ref 3.3–19.4)
KAPPA LC FREE/LAMBDA FREE SER: 1.41 {RATIO} (ref 0.26–1.65)
LABORATORY COMMENT REPORT: NORMAL
LAMBDA LC FREE SERPL-MCNC: 69.8 MG/L (ref 5.7–26.3)
M PROTEIN MFR UR ELPH: NORMAL %
METHYLMALONATE SERPL-SCNC: 186 NMOL/L (ref 0–378)
PROT UR-MCNC: 37 MG/DL

## 2022-09-26 NOTE — TELEPHONE ENCOUNTER
----- Message from Cheryl Dempsey MD sent at 9/24/2022  7:10 PM EDT -----  Homocystein elevated. Please send MMA also.  thanks

## 2022-09-26 NOTE — TELEPHONE ENCOUNTER
Called and spoke to [facility]. They will be contacting the patient later today.   Pt notified

## 2022-09-26 NOTE — TELEPHONE ENCOUNTER
Called pt, pt stated she has not had a call from the Nephrology office.   Advised I will follow up on the referral.   Pt stated she needs appointments on Fridays

## 2022-09-27 LAB
ALBUMIN SERPL ELPH-MCNC: 3.2 G/DL (ref 2.9–4.4)
ALBUMIN/GLOB SERPL: 0.8 {RATIO} (ref 0.7–1.7)
ALPHA1 GLOB SERPL ELPH-MCNC: 0.3 G/DL (ref 0–0.4)
ALPHA2 GLOB SERPL ELPH-MCNC: 1 G/DL (ref 0.4–1)
B-GLOBULIN SERPL ELPH-MCNC: 1.2 G/DL (ref 0.7–1.3)
GAMMA GLOB SERPL ELPH-MCNC: 1.6 G/DL (ref 0.4–1.8)
GLOBULIN SER-MCNC: 4.1 G/DL (ref 2.2–3.9)
IGA SERPL-MCNC: 352 MG/DL (ref 64–422)
IGG SERPL-MCNC: 1335 MG/DL (ref 586–1602)
IGM SERPL-MCNC: 795 MG/DL (ref 26–217)
INTERPRETATION SERPL IEP-IMP: ABNORMAL
M PROTEIN SERPL ELPH-MCNC: ABNORMAL G/DL
PROT SERPL-MCNC: 7.3 G/DL (ref 6–8.5)

## 2022-10-21 ENCOUNTER — OFFICE VISIT (OUTPATIENT)
Dept: FAMILY MEDICINE CLINIC | Facility: CLINIC | Age: 77
End: 2022-10-21
Payer: MEDICARE

## 2022-10-21 VITALS
HEIGHT: 62 IN | RESPIRATION RATE: 16 BRPM | OXYGEN SATURATION: 96 % | HEART RATE: 81 BPM | WEIGHT: 177.2 LBS | SYSTOLIC BLOOD PRESSURE: 122 MMHG | DIASTOLIC BLOOD PRESSURE: 68 MMHG | BODY MASS INDEX: 32.61 KG/M2

## 2022-10-21 DIAGNOSIS — E87.6 HYPOKALEMIA: ICD-10-CM

## 2022-10-21 DIAGNOSIS — D50.9 IRON DEFICIENCY ANEMIA, UNSPECIFIED IRON DEFICIENCY ANEMIA TYPE: ICD-10-CM

## 2022-10-21 DIAGNOSIS — K29.70 GASTRITIS, PRESENCE OF BLEEDING UNSPECIFIED, UNSPECIFIED CHRONICITY, UNSPECIFIED GASTRITIS TYPE: ICD-10-CM

## 2022-10-21 DIAGNOSIS — Z23 IMMUNIZATION DUE: ICD-10-CM

## 2022-10-21 DIAGNOSIS — R94.6 ABNORMAL THYROID FUNCTION TEST: Primary | ICD-10-CM

## 2022-10-21 DIAGNOSIS — R94.6 ABNORMAL THYROID FUNCTION TEST: ICD-10-CM

## 2022-10-21 PROCEDURE — G8484 FLU IMMUNIZE NO ADMIN: HCPCS | Performed by: FAMILY MEDICINE

## 2022-10-21 PROCEDURE — G8400 PT W/DXA NO RESULTS DOC: HCPCS | Performed by: FAMILY MEDICINE

## 2022-10-21 PROCEDURE — G0008 ADMIN INFLUENZA VIRUS VAC: HCPCS | Performed by: FAMILY MEDICINE

## 2022-10-21 PROCEDURE — 90694 VACC AIIV4 NO PRSRV 0.5ML IM: CPT | Performed by: FAMILY MEDICINE

## 2022-10-21 PROCEDURE — 1123F ACP DISCUSS/DSCN MKR DOCD: CPT | Performed by: FAMILY MEDICINE

## 2022-10-21 PROCEDURE — G8427 DOCREV CUR MEDS BY ELIG CLIN: HCPCS | Performed by: FAMILY MEDICINE

## 2022-10-21 PROCEDURE — 1036F TOBACCO NON-USER: CPT | Performed by: FAMILY MEDICINE

## 2022-10-21 PROCEDURE — G8417 CALC BMI ABV UP PARAM F/U: HCPCS | Performed by: FAMILY MEDICINE

## 2022-10-21 PROCEDURE — 1090F PRES/ABSN URINE INCON ASSESS: CPT | Performed by: FAMILY MEDICINE

## 2022-10-21 PROCEDURE — 99213 OFFICE O/P EST LOW 20 MIN: CPT | Performed by: FAMILY MEDICINE

## 2022-10-21 RX ORDER — UBIDECARENONE 30 MG
CAPSULE ORAL
COMMUNITY
End: 2022-10-24

## 2022-10-21 RX ORDER — FERROUS SULFATE 325(65) MG
325 TABLET ORAL DAILY
COMMUNITY

## 2022-10-21 ASSESSMENT — ENCOUNTER SYMPTOMS
VOMITING: 0
ABDOMINAL PAIN: 0
NAUSEA: 0
COUGH: 0
BLOOD IN STOOL: 0

## 2022-10-21 NOTE — PROGRESS NOTES
[address] [facility],[address], DO  [address], [address] [facility] [address]   No:  [phone]  Fax:  [phone]        Assessment/Plan:   Marc Lafleur was seen today for follow-up. Diagnoses and all orders for this visit:    Abnormal thyroid function test  Await repeat labs. Recent TSH WNL with low T4.  -     TSH; Future  -     T4, Free; Future    Iron deficiency anemia, unspecified iron deficiency anemia type  She has been started on iron supplement. She had recent EGD, gastritis noted on report.   established with hematology with upcoming appointment next week for recheck    Hypokalemia  Reviewed potassium levels drawn on 9/23/2022 with a potassium level of 3.2 she has been taken over-the-counter potassium. Await BMP  -     Basic Metabolic Panel; Future    Immunization due  -     Influenza, FLUAD, (age 72 y+), IM, PF, 0.5 mL    Gastritis, presence of bleeding unspecified, unspecified chronicity, unspecified gastritis type  She has been using omeprazole but not daily. Advised that she use omeprazole daily in light of recent gastritis noted on EGD. Reviewed report today. Pathology still pending. Latirce Paris is a 65 y.o. female who is seen for evaluation of   Chief Complaint   Patient presents with    Follow-up     Labs from [facility]       HPI:   She is here today due to abnormal TSH, free T4 and hypokalemia noted on recent hematology work-up. She has been taking potassium supplement. She had a hold it recently due to EGD and colonoscopy. She resumed it this morning. She has no known history of thyroid disorder. She has noted some darker stools since she started iron supplement. She had recent EGD and colonoscopy, she is still awaiting pathology results as this was done this week. She recently saw nephrology for acute on chronic kidney disease. She states that her nephrologist told her she was okay.     Review of Systems:  Review of Systems   Constitutional: Negative for chills and fever. Respiratory:  Negative for cough. Gastrointestinal:  Negative for abdominal pain, blood in stool, nausea and vomiting. History:  Past Medical History:   Diagnosis Date    CKD (chronic kidney disease) stage 3, GFR 30-59 ml/min ([facility])     Essential hypertension     Hyperlipidemia LDL goal <100     Menopause     S/P colonoscopy 2017 2022 to 2027    Spontaneous pneumothorax 2019    SSS (sick sinus syndrome) ([facility] [address]        Past Surgical History:   Procedure Laterality Date    BREAST BIOPSY Right     BREAST BIOPSY Left     CHOLECYSTECTOMY      COLONOSCOPY      HYSTERECTOMY, TOTAL ABDOMINAL (CERVIX REMOVED)      MICHAEL AND BSO (CERVIX REMOVED)         Current Outpatient Medications   Medication Sig Dispense Refill    ferrous sulfate (IRON 325) 325 (65 Fe) MG tablet Take 325 mg by mouth daily      Potassium Gluconate 550 MG TABS Take by mouth      hydrALAZINE (APRESOLINE) 25 MG tablet Take 1 tablet by mouth 3 times daily 270 tablet 3    simvastatin (ZOCOR) 40 MG tablet TAKE ONE TABLET BY MOUTH EVERY NIGHT 90 tablet 0    Multiple Vitamins-Minerals (PRESERVISION AREDS 2 PO) Take by mouth      aspirin 81 MG EC tablet Take by mouth daily      Biotin 2.5 MG TABS Take 5,000 mcg by mouth daily      Cholecalciferol 50 MCG (2000 UT) TABS Take by mouth      ezetimibe (ZETIA) 10 MG tablet Take 10 mg by mouth daily      nitroGLYCERIN (NITROSTAT) 0.4 MG SL tablet Place 0.4 mg under the tongue      omeprazole (PRILOSEC) 20 MG delayed release capsule Take 20 mg by mouth daily as needed       No current facility-administered medications for this visit.        Immunization History   Administered Date(s) Administered    COVID-19, MODERNA BLUE border, Primary or Immunocompromised, (age 12y+), IM, 100 mcg/0.5mL 01/29/2021, 02/26/2021    Influenza Virus Vaccine 10/01/2015, 11/02/2018, 09/20/2019    Influenza, FLUAD, (age 72 y+), Adjuvanted, 0.5mL 10/21/2022    Influenza, High Dose (Fluzone 65 yrs and older) 09/13/2019, 10/01/2020, 11/18/2021    Influenza, Triv, 3 Years and older, IM (Afluria (5 yrs and older) 09/29/2010    Influenza, Triv, inactivated, subunit, adjuvanted, IM (Fluad 65 yrs and older) 09/20/2019    Pneumococcal Conjugate 13-valent (Bmonxam04) 02/09/2016    Pneumococcal Polysaccharide (Izstciixe89) 09/20/2019    Pneumococcal Vaccine 11/25/2013    Td vaccine (adult) 02/14/2015    Tdap (Boostrix, Adacel) 02/14/2015    Zoster Recombinant (Shingrix) 09/04/2019, 03/04/2020             Vitals:    Vitals:    10/21/22 1159   BP: 122/68   Site: Left Upper Arm   Position: Sitting   Pulse: 81   Resp: 16   SpO2: 96%   Weight: 177 lb 3.2 oz (80.4 kg)   Height: 5' 2\" (1.575 m)          Physical Exam:  Physical Exam  Vitals reviewed. Constitutional:       Appearance: Normal appearance. HENT:      Head: Normocephalic and atraumatic. Cardiovascular:      Rate and Rhythm: Normal rate and regular rhythm. Heart sounds: No murmur heard. Pulmonary:      Effort: Pulmonary effort is normal. No respiratory distress. Breath sounds: Normal breath sounds. No wheezing or rhonchi. Abdominal:      General: There is no distension. Palpations: There is no mass. Tenderness: There is no abdominal tenderness. There is no right CVA tenderness, left CVA tenderness, guarding or rebound. Hernia: No hernia is present. Musculoskeletal:      Cervical back: Neck supple. Right lower leg: No edema. Left lower leg: No edema. Lymphadenopathy:      Cervical: No cervical adenopathy. Skin:     General: Skin is warm and dry. Neurological:      Mental Status: She is alert. Psychiatric:         Mood and Affect: Mood normal.         Behavior: Behavior normal.           Elvis Guerrero DO    This note was generated using Dragon voice recognition software.   There may be medical errors due to computer generated translation

## 2022-10-23 LAB
ANION GAP SERPL CALC-SCNC: 5 MMOL/L (ref 2–11)
BUN SERPL-MCNC: 15 MG/DL (ref 8–23)
CALCIUM SERPL-MCNC: 9.2 MG/DL (ref 8.3–10.4)
CHLORIDE SERPL-SCNC: 111 MMOL/L (ref 101–110)
CO2 SERPL-SCNC: 25 MMOL/L (ref 21–32)
CREAT SERPL-MCNC: 1.5 MG/DL (ref 0.6–1)
GLUCOSE SERPL-MCNC: 125 MG/DL (ref 65–100)
POTASSIUM SERPL-SCNC: 3.1 MMOL/L (ref 3.5–5.1)
SODIUM SERPL-SCNC: 141 MMOL/L (ref 133–143)
T4 FREE SERPL-MCNC: 0.6 NG/DL (ref 0.78–1.46)
TSH, 3RD GENERATION: 1.3 UIU/ML (ref 0.36–3.74)

## 2022-10-24 DIAGNOSIS — D50.9 IRON DEFICIENCY ANEMIA, UNSPECIFIED IRON DEFICIENCY ANEMIA TYPE: Primary | ICD-10-CM

## 2022-10-24 RX ORDER — POTASSIUM CHLORIDE 20 MEQ/1
20 TABLET, EXTENDED RELEASE ORAL 2 TIMES DAILY
Qty: 180 TABLET | Refills: 1 | Status: SHIPPED | OUTPATIENT
Start: 2022-10-24 | End: 2022-10-28

## 2022-10-27 NOTE — PROGRESS NOTES
[facility] Hematology and Oncology: Office Visit Established Patient    Reason for follow up:    Macrocytic anemia    Overview: (copied from prior)    Ms. Jeovany Constantino is a 65years old female patient with history of hypertension, hyperlipidemia, chronic kidney disease, colon polyps, total abdominal hysterectomy with bilateral salpingo-oophorectomy and spontaneous pneumothorax who has been referred for evaluation of microcytic anemia. On 8/26/202022, she presented to her PCP with complaints of fatigue and shortness of breath with activity. Hemoglobin was down to 8.3 from 11 (in March 2022). On evaluation today, patient reports moderate persistent fatigue and exertional dyspnea with onset a few months ago. She denies fevers, night sweats, chest pain, bloody/black stools, nausea, vomiting, change in appetite, heartburn, headache, gait disturbances, tingling/numbness or weakness in any part of her body. She denies any swollen glands easy bleeding or bruising. She has intentionally lost about 24 pounds over the course of last few months by following healthier eating habits. She is scheduled to undergo EGD and colonoscopy on 10/19/2022. 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 EGD showed gastritis determined by biopsy. Interval history: On evaluation today, patient reports improvement in energy and appetite. She has been taking oral iron. Denies chest pain, exertional dyspnea, nausea, vomiting, abdominal pain, black/bloody stools, dizziness, lightheadedness. Review of Systems:  14 point ROS was negative except as per HPI      ECOG PERFORMANCE STATUS - 0-Fully active, able to carry on all pre-disease performance without restriction. Pain - /10. None/Minimal pain - not affecting QOL     Fatigue - No flowsheet data found. Distress - No flowsheet data found.   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, diverticula colon, internal hemorrhoids       Reviewed and updated this visit by provider:  Tobacco  Allergies  Meds  Problems  Med Hx  Surg Hx  Fam Hx          Current Outpatient Medications   Medication Sig Dispense Refill    folic acid (FOLVITE) 1 MG tablet Take 1 tablet by mouth daily 90 tablet 1    potassium chloride (KLOR-CON M) 20 MEQ extended release tablet Take 1 tablet by mouth 2 times daily 180 tablet 1    ferrous sulfate (IRON 325) 325 (65 Fe) MG tablet Take 325 mg by mouth daily      hydrALAZINE (APRESOLINE) 25 MG tablet Take 1 tablet by mouth 3 times daily 270 tablet 3    simvastatin (ZOCOR) 40 MG tablet TAKE ONE TABLET BY MOUTH EVERY NIGHT 90 tablet 0    Multiple Vitamins-Minerals (PRESERVISION AREDS 2 PO) Take by mouth      aspirin 81 MG EC tablet Take by mouth daily      Biotin 2.5 MG TABS Take 5,000 mcg by mouth daily      Cholecalciferol 50 MCG (2000 UT) TABS Take by mouth      ezetimibe (ZETIA) 10 MG tablet Take 10 mg by mouth daily      omeprazole (PRILOSEC) 20 MG delayed release capsule Take 20 mg by mouth daily as needed      nitroGLYCERIN (NITROSTAT) 0.4 MG SL tablet Place 0.4 mg under the tongue (Patient not taking: Reported on 10/28/2022)       No current facility-administered medications for this visit.         OBJECTIVE:  /68 (Site: Left Upper Arm, Position: Standing)   Pulse 76   Temp 98.2 °F (36.8 °C)   Resp 12   Ht 5' 2\" (1.575 m)   Wt 179 lb 3.2 oz (81.3 kg)   SpO2 98%   BMI 32.78 kg/m²   Wt Readings from Last 3 Encounters:   10/28/22 179 lb 3.2 oz (81.3 kg)   10/21/22 177 lb 3.2 oz (80.4 kg)   09/23/22 177 lb 12.8 oz (80.6 kg)       Physical Exam:  Patient alert and oriented x 3, in no acute distress  Integumentary: No Pallor, no icterus  HEENT: Moist mucous membranes, normal oropharynx  Cardiovascular:RRR, S1, S2 present, + systolic murmur  Lungs: Clear to auscultation, no rales or wheezing, no accessory muscle use  Abdomen: Soft, and non-tender on palpation, no organomegaly, bowel sounds audible  Extremities: No peripheral edema, no joint swelling  Neurological: patient can follow commands and move all extremities    Labs:  Lab Results   Component Value Date    WBC 4.6 10/28/2022    HGB 7.5 (L) 10/28/2022    HCT 24.6 (L) 10/28/2022    MCV 96.1 10/28/2022     10/28/2022     Lab Results   Component Value Date    NEUTROABS 2.0 02/18/2022    LYMPHOPCT 40 10/28/2022    LYMPHSABS 1.9 10/28/2022    MONOPCT 7 10/28/2022    MONOSABS 0.3 10/28/2022    EOSABS 0.2 10/28/2022    BASOPCT 1 10/28/2022     Lab Results   Component Value Date     10/28/2022    K 2.9 (L) 10/28/2022     10/28/2022    CO2 26 10/28/2022    BUN 16 10/28/2022    CREATININE 1.60 (H) 10/28/2022    GLUCOSE 102 (H) 10/28/2022    CALCIUM 8.9 10/28/2022    PROT 7.5 10/28/2022    LABALBU 3.0 (L) 10/28/2022    BILITOT 0.4 10/28/2022    ALKPHOS 64 10/28/2022    AST 19 10/28/2022    ALT 15 10/28/2022    LABGLOM 33 (L) 10/28/2022    GFRAA 47 (L) 09/23/2022    AGRATIO 1.3 02/18/2022    GLOB 4.5 10/28/2022     Lab Results   Component Value Date    IRON 37 10/28/2022    TIBC 281 10/28/2022    FERRITIN 76 10/28/2022      Latest Reference Range & Units 10/28/22 09:54   TRANSFERRIN SATURATION >20 % 13 (L)   (L): Data is abnormally low    Polyclonal increase detected in 1 or more immunoglobulins on SPEP  Kappa lambda ratio normal-1.41  UPEP/HELLEN unremarkable  No evidence of hemolysis on labs. Methylmalonic acid level normal, homocystine elevated-pattern suggestive of folate deficiency      Imaging:  No results found. Problems:  1. Anemia, unspecified type      2. Stage 3 chronic kidney disease, unspecified whether stage 3a or 3b CKD ([address]    3. Labs are suggestive of iron deficiency despite taking daily oral iron. 4. Other general symptoms and signs     5. Macrocytosis    History of colonic polyps. Folate deficiency  Hypertension  Hyperlipidemia      PLAN:  -Normocytic anemia has persisted.   Patient denies any constitutional symptoms.  -Rx folic acid 1 mg p.o. daily.  -Scheduled for IV iron infusion.  -She will be scheduled for bone marrow biopsy to rule out the possibility of myelodysplasia. -Reviewed results of recent EGD and colonoscopy which did not reveal any active source of bleeding. Capsule endoscopy may be considered for further work-up. -Blood pressures controlled on current antihypertensive regimen which should be continued  -Continue with acetamide for dyslipidemia. All questions were answered to the best of my abilities. Return office visit with labs as scheduled. Zoila Aschoff, MD  [facility] Hematology and Oncology  [address] [place] [address]  Office : [phone]  Fax : [phone]    Elements of this note have been dictated using speech recognition software. As a result, errors of speech recognition may have occurred.

## 2022-10-28 ENCOUNTER — TELEPHONE (OUTPATIENT)
Dept: FAMILY MEDICINE CLINIC | Facility: CLINIC | Age: 77
End: 2022-10-28

## 2022-10-28 ENCOUNTER — OFFICE VISIT (OUTPATIENT)
Dept: ONCOLOGY | Age: 77
End: 2022-10-28
Payer: MEDICARE

## 2022-10-28 ENCOUNTER — HOSPITAL ENCOUNTER (OUTPATIENT)
Dept: LAB | Age: 77
Discharge: HOME | End: 2022-10-31
Payer: MEDICARE

## 2022-10-28 VITALS
HEIGHT: 62 IN | HEART RATE: 76 BPM | RESPIRATION RATE: 12 BRPM | OXYGEN SATURATION: 98 % | TEMPERATURE: 98.2 F | WEIGHT: 179.2 LBS | SYSTOLIC BLOOD PRESSURE: 116 MMHG | BODY MASS INDEX: 32.97 KG/M2 | DIASTOLIC BLOOD PRESSURE: 68 MMHG

## 2022-10-28 DIAGNOSIS — E87.6 HYPOKALEMIA: Primary | ICD-10-CM

## 2022-10-28 DIAGNOSIS — D50.9 IRON DEFICIENCY ANEMIA, UNSPECIFIED IRON DEFICIENCY ANEMIA TYPE: ICD-10-CM

## 2022-10-28 DIAGNOSIS — D64.9 ANEMIA, UNSPECIFIED TYPE: Primary | ICD-10-CM

## 2022-10-28 LAB
ALBUMIN SERPL-MCNC: 3 G/DL (ref 3.2–4.6)
ALBUMIN/GLOB SERPL: 0.7 {RATIO} (ref 0.4–1.6)
ALP SERPL-CCNC: 64 U/L (ref 50–136)
ALT SERPL-CCNC: 15 U/L (ref 12–65)
ANION GAP SERPL CALC-SCNC: 5 MMOL/L (ref 2–11)
AST SERPL-CCNC: 19 U/L (ref 15–37)
BASOPHILS # BLD: 0.1 K/UL (ref 0–0.2)
BASOPHILS NFR BLD: 1 % (ref 0–2)
BILIRUB SERPL-MCNC: 0.4 MG/DL (ref 0.2–1.1)
BUN SERPL-MCNC: 16 MG/DL (ref 8–23)
CALCIUM SERPL-MCNC: 8.9 MG/DL (ref 8.3–10.4)
CHLORIDE SERPL-SCNC: 110 MMOL/L (ref 101–110)
CO2 SERPL-SCNC: 26 MMOL/L (ref 21–32)
CREAT SERPL-MCNC: 1.6 MG/DL (ref 0.6–1)
DIFFERENTIAL METHOD BLD: ABNORMAL
EOSINOPHIL # BLD: 0.2 K/UL (ref 0–0.8)
EOSINOPHIL NFR BLD: 4 % (ref 0.5–7.8)
ERYTHROCYTE [DISTWIDTH] IN BLOOD BY AUTOMATED COUNT: 15.2 % (ref 11.9–14.6)
FERRITIN SERPL-MCNC: 76 NG/ML (ref 8–388)
GLOBULIN SER CALC-MCNC: 4.5 G/DL (ref 2.8–4.5)
GLUCOSE SERPL-MCNC: 102 MG/DL (ref 65–100)
HCT VFR BLD AUTO: 24.6 % (ref 35.8–46.3)
HGB BLD-MCNC: 7.5 G/DL (ref 11.7–15.4)
IMM GRANULOCYTES # BLD AUTO: 0 K/UL (ref 0–0.5)
IMM GRANULOCYTES NFR BLD AUTO: 1 % (ref 0–5)
IRON SATN MFR SERPL: 13 %
IRON SERPL-MCNC: 37 UG/DL (ref 35–150)
LYMPHOCYTES # BLD: 1.9 K/UL (ref 0.5–4.6)
LYMPHOCYTES NFR BLD: 40 % (ref 13–44)
MCH RBC QN AUTO: 29.3 PG (ref 26.1–32.9)
MCHC RBC AUTO-ENTMCNC: 30.5 G/DL (ref 31.4–35)
MCV RBC AUTO: 96.1 FL (ref 82–102)
MONOCYTES # BLD: 0.3 K/UL (ref 0.1–1.3)
MONOCYTES NFR BLD: 7 % (ref 4–12)
NEUTS SEG # BLD: 2.2 K/UL (ref 1.7–8.2)
NEUTS SEG NFR BLD: 47 % (ref 43–78)
NRBC # BLD: 0 K/UL (ref 0–0.2)
PLATELET # BLD AUTO: 219 K/UL (ref 150–450)
PMV BLD AUTO: 9.5 FL (ref 9.4–12.3)
POTASSIUM SERPL-SCNC: 2.9 MMOL/L (ref 3.5–5.1)
PROT SERPL-MCNC: 7.5 G/DL (ref 6.3–8.2)
RBC # BLD AUTO: 2.56 M/UL (ref 4.05–5.2)
SODIUM SERPL-SCNC: 141 MMOL/L (ref 133–143)
TIBC SERPL-MCNC: 281 UG/DL (ref 250–450)
WBC # BLD AUTO: 4.6 K/UL (ref 4.3–11.1)

## 2022-10-28 PROCEDURE — G8400 PT W/DXA NO RESULTS DOC: HCPCS | Performed by: INTERNAL MEDICINE

## 2022-10-28 PROCEDURE — 3074F SYST BP LT 130 MM HG: CPT | Performed by: INTERNAL MEDICINE

## 2022-10-28 PROCEDURE — 83550 IRON BINDING TEST: CPT

## 2022-10-28 PROCEDURE — G8484 FLU IMMUNIZE NO ADMIN: HCPCS | Performed by: INTERNAL MEDICINE

## 2022-10-28 PROCEDURE — G8417 CALC BMI ABV UP PARAM F/U: HCPCS | Performed by: INTERNAL MEDICINE

## 2022-10-28 PROCEDURE — 85025 COMPLETE CBC W/AUTO DIFF WBC: CPT

## 2022-10-28 PROCEDURE — 3078F DIAST BP <80 MM HG: CPT | Performed by: INTERNAL MEDICINE

## 2022-10-28 PROCEDURE — 1036F TOBACCO NON-USER: CPT | Performed by: INTERNAL MEDICINE

## 2022-10-28 PROCEDURE — G8427 DOCREV CUR MEDS BY ELIG CLIN: HCPCS | Performed by: INTERNAL MEDICINE

## 2022-10-28 PROCEDURE — 36415 COLL VENOUS BLD VENIPUNCTURE: CPT

## 2022-10-28 PROCEDURE — 1123F ACP DISCUSS/DSCN MKR DOCD: CPT | Performed by: INTERNAL MEDICINE

## 2022-10-28 PROCEDURE — 1090F PRES/ABSN URINE INCON ASSESS: CPT | Performed by: INTERNAL MEDICINE

## 2022-10-28 PROCEDURE — 82728 ASSAY OF FERRITIN: CPT

## 2022-10-28 PROCEDURE — 99214 OFFICE O/P EST MOD 30 MIN: CPT | Performed by: INTERNAL MEDICINE

## 2022-10-28 PROCEDURE — 80053 COMPREHEN METABOLIC PANEL: CPT

## 2022-10-28 RX ORDER — FAMOTIDINE 10 MG/ML
20 INJECTION, SOLUTION INTRAVENOUS
Status: CANCELLED | OUTPATIENT
Start: 2022-11-04

## 2022-10-28 RX ORDER — ONDANSETRON 2 MG/ML
8 INJECTION INTRAMUSCULAR; INTRAVENOUS
Status: CANCELLED | OUTPATIENT
Start: 2022-11-04

## 2022-10-28 RX ORDER — SODIUM CHLORIDE 9 MG/ML
INJECTION, SOLUTION INTRAVENOUS CONTINUOUS
Status: CANCELLED | OUTPATIENT
Start: 2022-11-04

## 2022-10-28 RX ORDER — EPINEPHRINE 1 MG/ML
0.3 INJECTION, SOLUTION, CONCENTRATE INTRAVENOUS PRN
Status: CANCELLED | OUTPATIENT
Start: 2022-11-04

## 2022-10-28 RX ORDER — ACETAMINOPHEN 325 MG/1
650 TABLET ORAL
Status: CANCELLED | OUTPATIENT
Start: 2022-11-04

## 2022-10-28 RX ORDER — POTASSIUM CHLORIDE 20 MEQ/1
40 TABLET, EXTENDED RELEASE ORAL 2 TIMES DAILY
Qty: 360 TABLET | Refills: 1 | Status: SHIPPED
Start: 2022-10-28

## 2022-10-28 RX ORDER — SODIUM CHLORIDE 9 MG/ML
5-250 INJECTION, SOLUTION INTRAVENOUS PRN
Status: CANCELLED | OUTPATIENT
Start: 2022-11-04

## 2022-10-28 RX ORDER — FOLIC ACID 1 MG/1
1 TABLET ORAL DAILY
Qty: 90 TABLET | Refills: 1 | Status: SHIPPED | OUTPATIENT
Start: 2022-10-28

## 2022-10-28 RX ORDER — DIPHENHYDRAMINE HYDROCHLORIDE 50 MG/ML
50 INJECTION INTRAMUSCULAR; INTRAVENOUS
Status: CANCELLED | OUTPATIENT
Start: 2022-11-04

## 2022-10-28 RX ORDER — HEPARIN SODIUM (PORCINE) LOCK FLUSH IV SOLN 100 UNIT/ML 100 UNIT/ML
500 SOLUTION INTRAVENOUS PRN
Status: CANCELLED | OUTPATIENT
Start: 2022-11-04

## 2022-10-28 RX ORDER — ALBUTEROL SULFATE 90 UG/1
4 AEROSOL, METERED RESPIRATORY (INHALATION) PRN
Status: CANCELLED | OUTPATIENT
Start: 2022-11-04

## 2022-10-28 RX ORDER — SODIUM CHLORIDE 0.9 % (FLUSH) 0.9 %
5-40 SYRINGE (ML) INJECTION PRN
Status: CANCELLED | OUTPATIENT
Start: 2022-11-04

## 2022-10-28 ASSESSMENT — PATIENT HEALTH QUESTIONNAIRE - PHQ9
SUM OF ALL RESPONSES TO PHQ QUESTIONS 1-9: 0
2. FEELING DOWN, DEPRESSED OR HOPELESS: 0
SUM OF ALL RESPONSES TO PHQ QUESTIONS 1-9: 0

## 2022-10-28 NOTE — PATIENT INSTRUCTIONS
Patient Instructions from Today's Visit    Reason for Visit:  Follow up - Anemia     Diagnosis Information:  [email or web address]    Plan: Your hemoglobin is still low. Your folate level is low - it is recommended that you take 1mg of Folic Acid daily. You can get this over the counter. Bone marrow biopsy recommended due to low hemoglobin - will need to rule out bone marrow disorder. Please call us if you have any questions or concerns before your next visit. Follow Up: Follow up with Dr. Elinor Alaniz after bone marrow biopsy, with labs prior.      Recent Lab Results:  Hospital Outpatient Visit on 10/28/2022   Component Date Value Ref Range Status    WBC 10/28/2022 4.6  4.3 - 11.1 K/uL Final    RBC 10/28/2022 2.56 (A)  4.05 - 5.2 M/uL Final    Hemoglobin 10/28/2022 7.5 (A)  11.7 - 15.4 g/dL Final    Hematocrit 10/28/2022 24.6 (A)  35.8 - 46.3 % Final    MCV 10/28/2022 96.1  82.0 - 102.0 FL Final    MCH 10/28/2022 29.3  26.1 - 32.9 PG Final    MCHC 10/28/2022 30.5 (A)  31.4 - 35.0 g/dL Final    RDW 10/28/2022 15.2 (A)  11.9 - 14.6 % Final    Platelets 23/78/9038 219  150 - 450 K/uL Final    MPV 10/28/2022 9.5  9.4 - 12.3 FL Final    nRBC 10/28/2022 0.00  0.0 - 0.2 K/uL Final    **Note: Absolute NRBC parameter is now reported with Hemogram**    Seg Neutrophils 10/28/2022 47  43 - 78 % Final    Lymphocytes 10/28/2022 40  13 - 44 % Final    Monocytes 10/28/2022 7  4.0 - 12.0 % Final    Eosinophils % 10/28/2022 4  0.5 - 7.8 % Final    Basophils 10/28/2022 1  0.0 - 2.0 % Final    Immature Granulocytes 10/28/2022 1  0.0 - 5.0 % Final    Segs Absolute 10/28/2022 2.2  1.7 - 8.2 K/UL Final    Absolute Lymph # 10/28/2022 1.9  0.5 - 4.6 K/UL Final    Absolute Mono # 10/28/2022 0.3  0.1 - 1.3 K/UL Final    Absolute Eos # 10/28/2022 0.2  0.0 - 0.8 K/UL Final    Basophils Absolute 10/28/2022 0.1  0.0 - 0.2 K/UL Final    Absolute Immature Granulocyte 10/28/2022 0.0  0.0 - 0.5 K/ Final    Differential Type 10/28/2022 AUTOMATED    Final         Treatment Summary has been discussed and given to patient: N/A        -------------------------------------------------------------------------------------------------------------------  Please call our office at [phone] if you have any  of the following symptoms:   Fever of 100.5 or greater  Chills  Shortness of breath  Swelling or pain in one leg    After office hours an answering service is available and will contact a provider for emergencies or if you are experiencing any of the above symptoms. Patient does express an interest in My Chart. My Chart log in information explained on the after visit summary printout at the [address] desk.     Jaelyn Mejia RN

## 2022-10-28 NOTE — TELEPHONE ENCOUNTER
Her labs with Oncology done today shows very low potassium. Please make sure she has been using the potassium supplement. If she has increase up to 2 tabs twice daily and schedule lab visit next week.

## 2022-10-28 NOTE — TELEPHONE ENCOUNTER
Called pt, pt stated she has been taking one tablet twice a day.    Advised to start taking 2 tablets twice a day, lab scheduled for 11/11, per pt request.

## 2022-10-31 ENCOUNTER — TELEPHONE (OUTPATIENT)
Dept: ONCOLOGY | Age: 77
End: 2022-10-31

## 2022-10-31 NOTE — TELEPHONE ENCOUNTER
Notified pt of IV iron apts and bmbx apt. Pt verbalized understanding. She knows to check [employer] regarding dates/times/location/apt info.     ----- Message from Ramiro Trevino MD sent at 10/28/2022 10:42 AM EDT -----  T sat low. Ferritin trending down. Please schedule for IV iron.  thanks

## 2022-11-01 ENCOUNTER — TELEPHONE (OUTPATIENT)
Dept: FAMILY MEDICINE CLINIC | Facility: CLINIC | Age: 77
End: 2022-11-01

## 2022-11-01 NOTE — TELEPHONE ENCOUNTER
Called and spoke to pt. Pt stated she is having Iron Infusions for the next couple of weeks, Dr. Watkins/Oncology. Scheduled lab for Dr. Tila Ordonez on 11/8/22.

## 2022-11-01 NOTE — TELEPHONE ENCOUNTER
----- Message from Medon Quan sent at 10/31/2022 10:37 AM EDT -----  Subject: Message to Provider    QUESTIONS  Information for Provider? Patient would like to reschedule 11/11 labs   ---------------------------------------------------------------------------  --------------  [id number] [employer]  [phone]; OK to leave message on voicemail  ---------------------------------------------------------------------------  --------------  SCRIPT ANSWERS  Relationship to Patient?  Self

## 2022-11-08 ENCOUNTER — NURSE ONLY (OUTPATIENT)
Dept: FAMILY MEDICINE CLINIC | Facility: CLINIC | Age: 77
End: 2022-11-08

## 2022-11-08 DIAGNOSIS — E87.6 HYPOKALEMIA: ICD-10-CM

## 2022-11-09 LAB
ANION GAP SERPL CALC-SCNC: 9 MMOL/L (ref 2–11)
BUN SERPL-MCNC: 16 MG/DL (ref 8–23)
CALCIUM SERPL-MCNC: 9.4 MG/DL (ref 8.3–10.4)
CHLORIDE SERPL-SCNC: 107 MMOL/L (ref 101–110)
CO2 SERPL-SCNC: 25 MMOL/L (ref 21–32)
CREAT SERPL-MCNC: 1.8 MG/DL (ref 0.6–1)
GLUCOSE SERPL-MCNC: 130 MG/DL (ref 65–100)
POTASSIUM SERPL-SCNC: 3.6 MMOL/L (ref 3.5–5.1)
SODIUM SERPL-SCNC: 141 MMOL/L (ref 133–143)

## 2022-11-09 ASSESSMENT — ENCOUNTER SYMPTOMS
BLOOD IN STOOL: 0
ABDOMINAL PAIN: 0
COUGH: 0
NAUSEA: 0
VOMITING: 0

## 2022-11-11 ENCOUNTER — HOSPITAL ENCOUNTER (OUTPATIENT)
Dept: CT IMAGING | Age: 77
Discharge: HOME | End: 2022-11-14
Payer: MEDICARE

## 2022-11-11 VITALS
SYSTOLIC BLOOD PRESSURE: 123 MMHG | WEIGHT: 179 LBS | OXYGEN SATURATION: 96 % | HEART RATE: 64 BPM | HEIGHT: 62 IN | DIASTOLIC BLOOD PRESSURE: 58 MMHG | BODY MASS INDEX: 32.94 KG/M2 | TEMPERATURE: 98.1 F | RESPIRATION RATE: 16 BRPM

## 2022-11-11 DIAGNOSIS — D64.9 ANEMIA, UNSPECIFIED TYPE: ICD-10-CM

## 2022-11-11 LAB
BASOPHILS # BLD: 0 K/UL (ref 0–0.2)
BASOPHILS NFR BLD: 1 % (ref 0–2)
BONE MARROW PREP & WRIGHT STAIN: NORMAL
DIFFERENTIAL METHOD BLD: ABNORMAL
EOSINOPHIL # BLD: 0.2 K/UL (ref 0–0.8)
EOSINOPHIL NFR BLD: 4 % (ref 0.5–7.8)
ERYTHROCYTE [DISTWIDTH] IN BLOOD BY AUTOMATED COUNT: 15.8 % (ref 11.9–14.6)
HCT VFR BLD AUTO: 24 % (ref 35.8–46.3)
HGB BLD-MCNC: 7.5 G/DL (ref 11.7–15.4)
IMM GRANULOCYTES # BLD AUTO: 0 K/UL (ref 0–0.5)
IMM GRANULOCYTES NFR BLD AUTO: 0 % (ref 0–5)
LYMPHOCYTES # BLD: 1.7 K/UL (ref 0.5–4.6)
LYMPHOCYTES NFR BLD: 38 % (ref 13–44)
MCH RBC QN AUTO: 30.1 PG (ref 26.1–32.9)
MCHC RBC AUTO-ENTMCNC: 31.3 G/DL (ref 31.4–35)
MCV RBC AUTO: 96.4 FL (ref 82–102)
MONOCYTES # BLD: 0.3 K/UL (ref 0.1–1.3)
MONOCYTES NFR BLD: 7 % (ref 4–12)
NEUTS SEG # BLD: 2.3 K/UL (ref 1.7–8.2)
NEUTS SEG NFR BLD: 50 % (ref 43–78)
NRBC # BLD: 0 K/UL (ref 0–0.2)
PLATELET # BLD AUTO: 193 K/UL (ref 150–450)
PMV BLD AUTO: 9.8 FL (ref 9.4–12.3)
RBC # BLD AUTO: 2.49 M/UL (ref 4.05–5.2)
WBC # BLD AUTO: 4.5 K/UL (ref 4.3–11.1)

## 2022-11-11 PROCEDURE — 88313 SPECIAL STAINS GROUP 2: CPT

## 2022-11-11 PROCEDURE — 88264 CHROMOSOME ANALYSIS 20-25: CPT

## 2022-11-11 PROCEDURE — 6360000002 HC RX W HCPCS: Performed by: RADIOLOGY

## 2022-11-11 PROCEDURE — 88311 DECALCIFY TISSUE: CPT

## 2022-11-11 PROCEDURE — 88305 TISSUE EXAM BY PATHOLOGIST: CPT

## 2022-11-11 PROCEDURE — 85025 COMPLETE CBC W/AUTO DIFF WBC: CPT

## 2022-11-11 PROCEDURE — 88185 FLOWCYTOMETRY/TC ADD-ON: CPT

## 2022-11-11 PROCEDURE — 88374 M/PHMTRC ALYS ISHQUANT/SEMIQ: CPT

## 2022-11-11 PROCEDURE — 88237 TISSUE CULTURE BONE MARROW: CPT

## 2022-11-11 PROCEDURE — 88184 FLOWCYTOMETRY/ TC 1 MARKER: CPT

## 2022-11-11 PROCEDURE — 2500000003 HC RX 250 WO HCPCS: Performed by: RADIOLOGY

## 2022-11-11 PROCEDURE — 2580000003 HC RX 258: Performed by: RADIOLOGY

## 2022-11-11 PROCEDURE — 77012 CT SCAN FOR NEEDLE BIOPSY: CPT

## 2022-11-11 RX ORDER — FENTANYL CITRATE 50 UG/ML
INJECTION, SOLUTION INTRAMUSCULAR; INTRAVENOUS
Status: COMPLETED | OUTPATIENT
Start: 2022-11-11 | End: 2022-11-11

## 2022-11-11 RX ORDER — OXYCODONE HYDROCHLORIDE 5 MG/1
5 TABLET ORAL EVERY 4 HOURS PRN
Status: DISCONTINUED | OUTPATIENT
Start: 2022-11-11 | End: 2022-11-15 | Stop reason: HOSPADM

## 2022-11-11 RX ORDER — ONDANSETRON 2 MG/ML
4 INJECTION INTRAMUSCULAR; INTRAVENOUS EVERY 8 HOURS PRN
Status: DISCONTINUED | OUTPATIENT
Start: 2022-11-11 | End: 2022-11-15 | Stop reason: HOSPADM

## 2022-11-11 RX ORDER — OXYCODONE HYDROCHLORIDE 5 MG/1
10 TABLET ORAL EVERY 4 HOURS PRN
Status: DISCONTINUED | OUTPATIENT
Start: 2022-11-11 | End: 2022-11-15 | Stop reason: HOSPADM

## 2022-11-11 RX ORDER — LIDOCAINE HYDROCHLORIDE 10 MG/ML
INJECTION, SOLUTION EPIDURAL; INFILTRATION; INTRACAUDAL; PERINEURAL
Status: COMPLETED | OUTPATIENT
Start: 2022-11-11 | End: 2022-11-11

## 2022-11-11 RX ORDER — SODIUM CHLORIDE 9 MG/ML
INJECTION, SOLUTION INTRAVENOUS CONTINUOUS PRN
Status: COMPLETED | OUTPATIENT
Start: 2022-11-11 | End: 2022-11-11

## 2022-11-11 RX ORDER — DIPHENHYDRAMINE HYDROCHLORIDE 50 MG/ML
INJECTION INTRAMUSCULAR; INTRAVENOUS
Status: COMPLETED | OUTPATIENT
Start: 2022-11-11 | End: 2022-11-11

## 2022-11-11 RX ORDER — MIDAZOLAM HYDROCHLORIDE 2 MG/2ML
INJECTION, SOLUTION INTRAMUSCULAR; INTRAVENOUS
Status: COMPLETED | OUTPATIENT
Start: 2022-11-11 | End: 2022-11-11

## 2022-11-11 RX ORDER — SODIUM CHLORIDE 9 MG/ML
INJECTION, SOLUTION INTRAVENOUS CONTINUOUS
Status: DISCONTINUED | OUTPATIENT
Start: 2022-11-11 | End: 2022-11-15 | Stop reason: HOSPADM

## 2022-11-11 RX ADMIN — MIDAZOLAM HYDROCHLORIDE 1 MG: 1 INJECTION, SOLUTION INTRAMUSCULAR; INTRAVENOUS at 13:12

## 2022-11-11 RX ADMIN — FENTANYL CITRATE 50 MCG: 50 INJECTION, SOLUTION INTRAMUSCULAR; INTRAVENOUS at 13:21

## 2022-11-11 RX ADMIN — SODIUM CHLORIDE 500 ML: 900 INJECTION, SOLUTION INTRAVENOUS at 13:12

## 2022-11-11 RX ADMIN — DIPHENHYDRAMINE HYDROCHLORIDE 50 MG: 50 INJECTION, SOLUTION INTRAMUSCULAR; INTRAVENOUS at 13:12

## 2022-11-11 RX ADMIN — LIDOCAINE HYDROCHLORIDE 8 ML: 10 INJECTION, SOLUTION EPIDURAL; INFILTRATION; INTRACAUDAL; PERINEURAL at 13:23

## 2022-11-11 RX ADMIN — MIDAZOLAM HYDROCHLORIDE 1 MG: 1 INJECTION, SOLUTION INTRAMUSCULAR; INTRAVENOUS at 13:21

## 2022-11-11 RX ADMIN — FENTANYL CITRATE 50 MCG: 50 INJECTION, SOLUTION INTRAMUSCULAR; INTRAVENOUS at 13:12

## 2022-11-11 NOTE — BRIEF OP NOTE
Department of Interventional Radiology  [phone]        Interventional Radiology Brief Procedure Note    Patient: Cande Pearce MRN: [id number]  SSN: [id number]    YOB: 1945  Age: 65 y.o. Sex: female      Date of Procedure: 11/11/2022    Pre-Procedure Diagnosis: Anemia     Post-Procedure Diagnosis: SAME    Procedure(s):  Image Guided Biopsy    Brief Description of Procedure: Right iliac bone marrow aspiration and core biopsy    Performed By: Laurence Marie MD     Assistants: None    Anesthesia:Moderate Sedation    Estimated Blood Loss: Less than 10ml    Specimens:  Pathology    Implants:  None    Findings: Unremarkable.      Complications: None    Recommendations: 1 hour bedrest.       Follow Up: Dr Dana Tarango    Signed By: Laurence Marie MD     November 11, 2022

## 2022-11-11 NOTE — OR NURSING
TRANSFER - OUT REPORT:           Verbal report given to Bev Gomez RN on Jose A Bacon  being transferred to IR Recovery for routine post-op              Report consisted of patients Situation, Background, Assessment and      Recommendations(SBAR). Information from the following report(s) SBAR, Procedure Summary and MAR was reviewed with the receiving nurse. Opportunity for questions and clarification was provided. Conscious Sedation:    100 Mcg of Fentanyl administered   2 Mg of Versed administered   50 Mg of Benadryl administered        Pt tolerated procedure well.        VITALS:  BP (!) 122/57   Pulse 64   Temp 98.1 °F (36.7 °C) (Temporal)   Resp 16   Ht 5' 2\" (1.575 m)   Wt 179 lb (81.2 kg)   SpO2 100%   BMI 32.74 kg/m²   PAIN SCORE:  0/10

## 2022-11-11 NOTE — PRE SEDATION
Sedation Pre-Procedure Note    Patient Name: Nikole Hummel   YOB: 1945  Room/Bed: Room/bed info not found  Medical Record Number: [id number]  Date: 11/11/2022   Time: 12:45 PM         Vital Signs:   Vitals:    11/11/22 1230   BP: (!) 151/67   Pulse: 71   Resp: 16   Temp: 98.1 °F (36.7 °C)   SpO2: 98%     Pre-Sedation Documentation and Exam:   I have personally completed a history, physical exam & review of systems for this patient (see notes).     Mallampati Airway Assessment:  Mallampati Class  IV - (soft palate not visible)    ASA Classification:  Class 3 - A patient with severe systemic disease that limits activity but is not incapacitating    Sedation/ Anesthesia Plan:   intravenous sedation    Electronically signed by Lubna Arreola MD on 11/11/2022 at 12:45 PM

## 2022-11-11 NOTE — DISCHARGE INSTRUCTIONS
If you have any questions about your procedure, please call the Interventional Radiology department at [phone]. After business hours (5pm) and weekends, call the answering service at [phone] and ask for the Radiologist on call to be paged. Si tiene Preguntas acerca del procedimiento, por favor llame al departamento de Radiología Intervencional al [phone]. Después de horas de oficina (5 pm) y los fines de [place], llamar al Siomara Solitario al [phone] y pregunte por el Radiologo de [facility].

## 2022-11-11 NOTE — OR NURSING
Patient transported to CT via stretcher. Patient moved to the procedure table without assistance. Patient secured to the procedure table. Pt connected to EKG, SPO2, BP and ETCO2     A Pre-assessment was conducted and a set of vital signs were completed. The provider was notified of any changes from the assessment and/or vital signs immediately prior to beginning moderate sedation.

## 2022-11-11 NOTE — OR NURSING
Recovery period without difficulty. Pt alert and oriented and denies pain. Dressing is clean, dry, and intact. Reviewed discharge instructions with patient and daughter, both verbalized understanding. Pt escorted to lobby discharge area via wheelchair. Vital signs and Yuri score completed.

## 2022-11-11 NOTE — H&P
Department of Interventional Radiology  ([phone])    History and Physical    Patient:  Hoda Sanford MRN:  [id number]  SSN:  [id number]    YOB: 1945  Age:  65 y.o. Sex:  female      Primary Care Provider:  Isael Weiner DO  Referring Physician:  Chitra Mendez MD    Subjective:     Chief Complaint: Anemia     History of the Present Illness: The patient is a 65 y.o. female who presents with Anemia. Feeling anxious. NPO. Baby ASA, no thinners. Daughter accompanies. Past Medical History:   Diagnosis Date    CKD (chronic kidney disease) stage 3, GFR 30-59 ml/min (HCC)     Essential hypertension     Hyperlipidemia LDL goal <100     Menopause     S/P colonoscopy 2017 2022 to 2027    Spontaneous pneumothorax 2019    SSS (sick sinus syndrome) [address] [address]      Past Surgical History:   Procedure Laterality Date    BREAST BIOPSY Right     BREAST BIOPSY Left     CHOLECYSTECTOMY      COLONOSCOPY      10/2022    ESOPHAGOGASTRODUODENOSCOPY  10/2022    HYSTERECTOMY, TOTAL ABDOMINAL (CERVIX REMOVED)      MICHAEL AND BSO (CERVIX REMOVED)          Review of Systems:    Pertinent items are noted in HPI.     [unfilled]     Allergies   Allergen Reactions    Lisinopril Angioedema       Family History   Problem Relation Age of Onset    Heart Disease Mother         circulation issues    Breast Cancer Neg Hx     No Known Problems Father      Social History     Tobacco Use    Smoking status: Never    Smokeless tobacco: Never   Substance Use Topics    Alcohol use: Yes     Comment: occ          Objective:       Physical Examination:      General:  normal appearance  Heart:  S1, S2 normal  Lungs:  clear to auscultation bilaterally  Abdomen:  nondistended and normal bowel sounds  Neuro:  normal without focal findings    Laboratory:     Lab Results   Component Value Date/Time     11/08/2022 02:03 PM     10/28/2022 09:54 AM    K 3.6 11/08/2022 02:03 PM    K 2.9 10/28/2022 09:54 AM     11/08/2022 02:03 PM     10/28/2022 09:54 AM    CO2 25 11/08/2022 02:03 PM    CO2 26 10/28/2022 09:54 AM    BUN 16 11/08/2022 02:03 PM    BUN 16 10/28/2022 09:54 AM    GFRAA 47 09/23/2022 10:41 AM    GFRAA 31 08/26/2022 10:03 AM    GLOB 4.5 10/28/2022 09:54 AM    GLOB 4.8 09/23/2022 10:41 AM    GLOB 4.1 09/23/2022 10:41 AM    ALT 15 10/28/2022 09:54 AM    ALT 11 09/23/2022 10:41 AM     Lab Results   Component Value Date/Time    WBC 4.6 10/28/2022 09:54 AM    WBC 4.4 09/23/2022 10:41 AM    HGB 7.5 10/28/2022 09:54 AM    HGB 7.6 09/23/2022 10:41 AM    HCT 24.6 10/28/2022 09:54 AM    HCT 25.1 09/23/2022 10:41 AM     10/28/2022 09:54 AM     09/23/2022 10:41 AM     No results found for: APTT, INR    Assessment:     Anemia    Plan:     Planned Procedure:  Bone marrow aspiration and core biopsy. Sedation. Risks, benefits, and alternatives reviewed with patient and she agrees to proceed.       Signed By: Snow Knott MD     November 11, 2022

## 2022-11-17 LAB
FLOW CYTOMETRY RESULTS: NORMAL
SPECIMEN SOURCE: NORMAL
TEST ORDERED: NORMAL

## 2022-11-18 ENCOUNTER — HOSPITAL ENCOUNTER (OUTPATIENT)
Dept: INFUSION THERAPY | Age: 77
Discharge: HOME | End: 2022-11-18
Payer: MEDICARE

## 2022-11-18 VITALS
TEMPERATURE: 98 F | HEART RATE: 69 BPM | OXYGEN SATURATION: 96 % | DIASTOLIC BLOOD PRESSURE: 59 MMHG | RESPIRATION RATE: 18 BRPM | SYSTOLIC BLOOD PRESSURE: 122 MMHG

## 2022-11-18 DIAGNOSIS — D64.9 ANEMIA, UNSPECIFIED TYPE: Primary | ICD-10-CM

## 2022-11-18 PROCEDURE — 6360000002 HC RX W HCPCS: Performed by: INTERNAL MEDICINE

## 2022-11-18 PROCEDURE — 96365 THER/PROPH/DIAG IV INF INIT: CPT

## 2022-11-18 PROCEDURE — 2580000003 HC RX 258: Performed by: INTERNAL MEDICINE

## 2022-11-18 RX ORDER — DIPHENHYDRAMINE HYDROCHLORIDE 50 MG/ML
50 INJECTION INTRAMUSCULAR; INTRAVENOUS
Status: CANCELLED | OUTPATIENT
Start: 2022-11-25

## 2022-11-18 RX ORDER — SODIUM CHLORIDE 9 MG/ML
5-250 INJECTION, SOLUTION INTRAVENOUS PRN
Status: CANCELLED | OUTPATIENT
Start: 2022-11-25

## 2022-11-18 RX ORDER — ONDANSETRON 2 MG/ML
8 INJECTION INTRAMUSCULAR; INTRAVENOUS
Status: DISCONTINUED | OUTPATIENT
Start: 2022-11-18 | End: 2022-11-19 | Stop reason: HOSPADM

## 2022-11-18 RX ORDER — SODIUM CHLORIDE 9 MG/ML
5-250 INJECTION, SOLUTION INTRAVENOUS PRN
Status: DISCONTINUED | OUTPATIENT
Start: 2022-11-18 | End: 2022-11-19 | Stop reason: HOSPADM

## 2022-11-18 RX ORDER — ACETAMINOPHEN 325 MG/1
650 TABLET ORAL
Status: CANCELLED | OUTPATIENT
Start: 2022-11-25

## 2022-11-18 RX ORDER — SODIUM CHLORIDE 0.9 % (FLUSH) 0.9 %
5-40 SYRINGE (ML) INJECTION PRN
Status: CANCELLED | OUTPATIENT
Start: 2022-11-25

## 2022-11-18 RX ORDER — SODIUM CHLORIDE 9 MG/ML
INJECTION, SOLUTION INTRAVENOUS CONTINUOUS
Status: CANCELLED | OUTPATIENT
Start: 2022-11-25

## 2022-11-18 RX ORDER — DIPHENHYDRAMINE HYDROCHLORIDE 50 MG/ML
50 INJECTION INTRAMUSCULAR; INTRAVENOUS
Status: DISCONTINUED | OUTPATIENT
Start: 2022-11-18 | End: 2022-11-19 | Stop reason: HOSPADM

## 2022-11-18 RX ORDER — ONDANSETRON 2 MG/ML
8 INJECTION INTRAMUSCULAR; INTRAVENOUS
Status: CANCELLED | OUTPATIENT
Start: 2022-11-25

## 2022-11-18 RX ORDER — ALBUTEROL SULFATE 90 UG/1
4 AEROSOL, METERED RESPIRATORY (INHALATION) PRN
Status: DISCONTINUED | OUTPATIENT
Start: 2022-11-18 | End: 2022-11-19 | Stop reason: HOSPADM

## 2022-11-18 RX ORDER — HEPARIN SODIUM (PORCINE) LOCK FLUSH IV SOLN 100 UNIT/ML 100 UNIT/ML
500 SOLUTION INTRAVENOUS PRN
Status: CANCELLED | OUTPATIENT
Start: 2022-11-25

## 2022-11-18 RX ORDER — EPINEPHRINE 1 MG/ML
0.3 INJECTION, SOLUTION, CONCENTRATE INTRAVENOUS PRN
Status: CANCELLED | OUTPATIENT
Start: 2022-11-25

## 2022-11-18 RX ORDER — ALBUTEROL SULFATE 90 UG/1
4 AEROSOL, METERED RESPIRATORY (INHALATION) PRN
Status: CANCELLED | OUTPATIENT
Start: 2022-11-25

## 2022-11-18 RX ADMIN — SODIUM CHLORIDE 20 ML/HR: 9 INJECTION, SOLUTION INTRAVENOUS at 14:30

## 2022-11-18 RX ADMIN — FERRIC CARBOXYMALTOSE INJECTION 750 MG: 50 INJECTION, SOLUTION INTRAVENOUS at 14:43

## 2022-11-18 NOTE — PROGRESS NOTES
Arrived to the [facility]. Assessment completed. Injectafer completed. Patient tolerated without problems. Patient stayed for 30 min observation post infusion with no problems noted  Any issues or concerns during appointment: None  Instructed to call Dr Denia Kapoor with any side effects or concerns  Patient aware of next infusion appointment on 11/25/22(date) at 10 30 AM (time).   Discharged ambulatory

## 2022-11-22 DIAGNOSIS — D64.9 ANEMIA, UNSPECIFIED TYPE: Primary | ICD-10-CM

## 2022-11-24 NOTE — PROGRESS NOTES
[facility] Hematology and Oncology: Office Visit Established Patient    Reason for follow up:    Macrocytic anemia    Overview: (copied from prior)    Ms. Brigid Collier is a 65years old female patient with history of hypertension, hyperlipidemia, chronic kidney disease, colon polyps, total abdominal hysterectomy with bilateral salpingo-oophorectomy and spontaneous pneumothorax who has been referred for evaluation of microcytic anemia. On 8/26/202022, she presented to her PCP with complaints of fatigue and shortness of breath with activity. Hemoglobin was down to 8.3 from 11 (in March 2022). On evaluation today, patient reports moderate persistent fatigue and exertional dyspnea with onset a few months ago. She denies fevers, night sweats, chest pain, bloody/black stools, nausea, vomiting, change in appetite, heartburn, headache, gait disturbances, tingling/numbness or weakness in any part of her body. She denies any swollen glands easy bleeding or bruising. She has intentionally lost about 24 pounds over the course of last few months by following healthier eating habits. She is scheduled to undergo EGD and colonoscopy on 10/19/2022. 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 EGD showed gastritis determined by biopsy. Interval history:  Feels significant improvement in energy and appetite. She is due to for second dose of IV iron today. Reports dark discoloration of stools 2/2 oral iron   Denies chest pain, exertional dyspnea, nausea, vomiting, abdominal pain, black/bloody stools, dizziness, lightheadedness. Review of Systems:  14 point ROS was negative except as per HPI      ECOG PERFORMANCE STATUS - 0-Fully active, able to carry on all pre-disease performance without restriction. Pain - /10. None/Minimal pain - not affecting QOL     Fatigue - No flowsheet data found. Distress - No flowsheet data found.   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, diverticula colon, internal hemorrhoids       Reviewed and updated this visit by provider:  Tobacco  Allergies  Meds  Problems  Med Hx  Surg Hx  Fam Hx          Current Outpatient Medications   Medication Sig Dispense Refill    folic acid (FOLVITE) 1 MG tablet Take 1 tablet by mouth daily 90 tablet 1    potassium chloride (KLOR-CON M) 20 MEQ extended release tablet Take 2 tablets by mouth 2 times daily 360 tablet 1    ferrous sulfate (IRON 325) 325 (65 Fe) MG tablet Take 325 mg by mouth daily      hydrALAZINE (APRESOLINE) 25 MG tablet Take 1 tablet by mouth 3 times daily 270 tablet 3    simvastatin (ZOCOR) 40 MG tablet TAKE ONE TABLET BY MOUTH EVERY NIGHT 90 tablet 0    Multiple Vitamins-Minerals (PRESERVISION AREDS 2 PO) Take by mouth      aspirin 81 MG EC tablet Take by mouth daily      Biotin 2.5 MG TABS Take 5,000 mcg by mouth daily      Cholecalciferol 50 MCG (2000 UT) TABS Take by mouth      ezetimibe (ZETIA) 10 MG tablet Take 10 mg by mouth daily      nitroGLYCERIN (NITROSTAT) 0.4 MG SL tablet Place 0.4 mg under the tongue (Patient not taking: Reported on 10/28/2022)      omeprazole (PRILOSEC) 20 MG delayed release capsule Take 20 mg by mouth daily as needed       No current facility-administered medications for this visit.         OBJECTIVE:  /60   Pulse 79   Temp 98.4 °F (36.9 °C)   Resp 16   Wt 179 lb (81.2 kg)   SpO2 97%   BMI 32.74 kg/m²   Wt Readings from Last 3 Encounters:   11/25/22 179 lb (81.2 kg)   11/11/22 179 lb (81.2 kg)   10/28/22 179 lb 3.2 oz (81.3 kg)       Physical Exam:  Patient alert and oriented x 3, in no acute distress  Integumentary: No Pallor, no icterus  HEENT: Moist mucous membranes, normal oropharynx  Cardiovascular:RRR, S1, S2 present, + systolic murmur  Lungs: Clear to auscultation, no rales or wheezing, no accessory muscle use  Abdomen: Soft, and non-tender on palpation, no organomegaly, bowel sounds audible  Extremities: No peripheral edema, no joint swelling  Neurological: patient can follow commands and move all extremities    Labs:  Lab Results   Component Value Date    WBC 5.7 11/25/2022    HGB 9.1 (L) 11/25/2022    HCT 29.2 (L) 11/25/2022    MCV 97.0 11/25/2022     11/25/2022     Lab Results   Component Value Date    NEUTROABS 2.0 02/18/2022    LYMPHOPCT 41 11/25/2022    LYMPHSABS 2.3 11/25/2022    MONOPCT 6 11/25/2022    MONOSABS 0.3 11/25/2022    EOSABS 0.2 11/25/2022    BASOPCT 1 11/25/2022     Lab Results   Component Value Date     11/25/2022    K 3.2 (L) 11/25/2022     11/25/2022    CO2 25 11/25/2022    BUN 16 11/25/2022    CREATININE 1.50 (H) 11/25/2022    GLUCOSE 88 11/25/2022    CALCIUM 9.1 11/25/2022    PROT 7.8 11/25/2022    LABALBU 3.4 11/25/2022    BILITOT 0.4 11/25/2022    ALKPHOS 62 11/25/2022    AST 24 11/25/2022    ALT 14 11/25/2022    LABGLOM 35 (L) 11/25/2022    GFRAA 47 (L) 09/23/2022    AGRATIO 1.3 02/18/2022    GLOB 4.4 11/25/2022     Lab Results   Component Value Date    IRON 37 10/28/2022    TIBC 281 10/28/2022    FERRITIN 76 10/28/2022      Latest Reference Range & Units 10/28/22 09:54   TRANSFERRIN SATURATION >20 % 13 (L)   (L): Data is abnormally low    Polyclonal increase detected in 1 or more immunoglobulins on SPEP  Kappa lambda ratio normal-1.41  UPEP/HELLEN unremarkable  No evidence of hemolysis on labs. Methylmalonic acid level normal, homocystine elevated-pattern suggestive of folate deficiency     BONE MARROW SYNOPTIC REPORT     SPECIMEN:  Peripheral blood smear, bone marrow aspiration, bone marrow   aspirate clot, bone marrow core biopsy, bone marrow core touch   preparation. PROCEDURE:  Aspiration, biopsy. ASPIRATION SITE:  Posterior iliac crest.   BIOPSY SITE:  Posterior iliac crest.   HISTOLOGIC TYPE:  No definitive evidence of a bone marrow disorder   IMMUNOPHENOTYPING:        Flow cytometric analysis shows no immunophenotypic abnormalities.           Immunohistochemical stains show no increase in blasts. CYTOGENIC STUDIES:  Normal female karyotype. FLUORESCENCE IN SITU HYBRIDIZATION:  Normal results. MOLECULAR GENETIC STUDIES:          Myeloid Disorders Profile:  No pathogenic mutations are detected. COMMENT:  The bone marrow is normocellular for age with no definitive   evidence of a bone marrow disorder. In the absence of cytogenetics or   molecular abnormalities, the diagnosis of MDS is less likely. Imaging:  No results found. Problems:  Normocytic anemia, bm bx : No definitive morphologic evidence of a bone marrow disorder   Inappropriately low Ferritin in the setting of stage III CKD  2. Stage 3 chronic kidney disease, unspecified whether stage 3a or 3b CKD ([facility] [address]    3. Labs are suggestive of iron deficiency despite taking daily oral iron. 4. Other general symptoms and signs     5. Macrocytosis    History of colonic polyps. Folate deficiency  Hypertension  Hyperlipidemia  GERD     Leukocytes and Plt preserved. PLAN:  Bone marrow biopsy: No definitive morphologic evidence of a bone marrow disorder    c/w folic acid. Proceed with IV iron as planned. Recent EGD and colonoscopy did not reveal any active source of bleeding. Capsule endoscopy may be considered for further work-up.  -She will continue taking potassium chloride, hydralazine, simvastatin, aspirin, vitamin D and Prilosec.  -Continue with simvastatin for dyslipidemia. All questions were answered to the best of my abilities. Return office visit with labs as scheduled. Samara Rodriguez MD  [facility] Hematology and Oncology  [address] [place] [address]  Office : [phone]  Fax : [phone]    Elements of this note have been dictated using speech recognition software. As a result, errors of speech recognition may have occurred.

## 2022-11-25 ENCOUNTER — OFFICE VISIT (OUTPATIENT)
Dept: ONCOLOGY | Age: 77
End: 2022-11-25
Payer: MEDICARE

## 2022-11-25 ENCOUNTER — HOSPITAL ENCOUNTER (OUTPATIENT)
Dept: LAB | Age: 77
Discharge: HOME | End: 2022-11-28
Payer: MEDICARE

## 2022-11-25 ENCOUNTER — HOSPITAL ENCOUNTER (OUTPATIENT)
Dept: INFUSION THERAPY | Age: 77
Discharge: HOME | End: 2022-11-25
Payer: MEDICARE

## 2022-11-25 VITALS
BODY MASS INDEX: 32.74 KG/M2 | DIASTOLIC BLOOD PRESSURE: 60 MMHG | WEIGHT: 179 LBS | OXYGEN SATURATION: 97 % | RESPIRATION RATE: 16 BRPM | TEMPERATURE: 98.4 F | SYSTOLIC BLOOD PRESSURE: 136 MMHG | HEART RATE: 79 BPM

## 2022-11-25 VITALS
TEMPERATURE: 98.4 F | HEART RATE: 69 BPM | SYSTOLIC BLOOD PRESSURE: 135 MMHG | DIASTOLIC BLOOD PRESSURE: 67 MMHG | RESPIRATION RATE: 16 BRPM

## 2022-11-25 DIAGNOSIS — D64.9 ANEMIA, UNSPECIFIED TYPE: Primary | ICD-10-CM

## 2022-11-25 DIAGNOSIS — D50.9 IRON DEFICIENCY ANEMIA, UNSPECIFIED IRON DEFICIENCY ANEMIA TYPE: ICD-10-CM

## 2022-11-25 DIAGNOSIS — D64.9 ANEMIA, UNSPECIFIED TYPE: ICD-10-CM

## 2022-11-25 DIAGNOSIS — N18.30 STAGE 3 CHRONIC KIDNEY DISEASE, UNSPECIFIED WHETHER STAGE 3A OR 3B CKD (HCC): Primary | ICD-10-CM

## 2022-11-25 LAB
ALBUMIN SERPL-MCNC: 3.4 G/DL (ref 3.2–4.6)
ALBUMIN/GLOB SERPL: 0.8 {RATIO} (ref 0.4–1.6)
ALP SERPL-CCNC: 62 U/L (ref 50–136)
ALT SERPL-CCNC: 14 U/L (ref 12–65)
ANION GAP SERPL CALC-SCNC: 5 MMOL/L (ref 2–11)
AST SERPL-CCNC: 24 U/L (ref 15–37)
BASOPHILS # BLD: 0.1 K/UL (ref 0–0.2)
BASOPHILS NFR BLD: 1 % (ref 0–2)
BILIRUB SERPL-MCNC: 0.4 MG/DL (ref 0.2–1.1)
BUN SERPL-MCNC: 16 MG/DL (ref 8–23)
CALCIUM SERPL-MCNC: 9.1 MG/DL (ref 8.3–10.4)
CHLORIDE SERPL-SCNC: 110 MMOL/L (ref 101–110)
CO2 SERPL-SCNC: 25 MMOL/L (ref 21–32)
CREAT SERPL-MCNC: 1.5 MG/DL (ref 0.6–1)
DIFFERENTIAL METHOD BLD: ABNORMAL
EOSINOPHIL # BLD: 0.2 K/UL (ref 0–0.8)
EOSINOPHIL NFR BLD: 4 % (ref 0.5–7.8)
ERYTHROCYTE [DISTWIDTH] IN BLOOD BY AUTOMATED COUNT: 16.3 % (ref 11.9–14.6)
GLOBULIN SER CALC-MCNC: 4.4 G/DL (ref 2.8–4.5)
GLUCOSE SERPL-MCNC: 88 MG/DL (ref 65–100)
HCT VFR BLD AUTO: 29.2 % (ref 35.8–46.3)
HGB BLD-MCNC: 9.1 G/DL (ref 11.7–15.4)
IMM GRANULOCYTES # BLD AUTO: 0.1 K/UL (ref 0–0.5)
IMM GRANULOCYTES NFR BLD AUTO: 1 % (ref 0–5)
LYMPHOCYTES # BLD: 2.3 K/UL (ref 0.5–4.6)
LYMPHOCYTES NFR BLD: 41 % (ref 13–44)
MCH RBC QN AUTO: 30.2 PG (ref 26.1–32.9)
MCHC RBC AUTO-ENTMCNC: 31.2 G/DL (ref 31.4–35)
MCV RBC AUTO: 97 FL (ref 82–102)
MONOCYTES # BLD: 0.3 K/UL (ref 0.1–1.3)
MONOCYTES NFR BLD: 6 % (ref 4–12)
NEUTS SEG # BLD: 2.7 K/UL (ref 1.7–8.2)
NEUTS SEG NFR BLD: 47 % (ref 43–78)
NRBC # BLD: 0 K/UL (ref 0–0.2)
PLATELET # BLD AUTO: 209 K/UL (ref 150–450)
PMV BLD AUTO: 9.9 FL (ref 9.4–12.3)
POTASSIUM SERPL-SCNC: 3.2 MMOL/L (ref 3.5–5.1)
PROT SERPL-MCNC: 7.8 G/DL (ref 6.3–8.2)
RBC # BLD AUTO: 3.01 M/UL (ref 4.05–5.2)
SODIUM SERPL-SCNC: 140 MMOL/L (ref 133–143)
WBC # BLD AUTO: 5.7 K/UL (ref 4.3–11.1)

## 2022-11-25 PROCEDURE — 99214 OFFICE O/P EST MOD 30 MIN: CPT | Performed by: INTERNAL MEDICINE

## 2022-11-25 PROCEDURE — 1090F PRES/ABSN URINE INCON ASSESS: CPT | Performed by: INTERNAL MEDICINE

## 2022-11-25 PROCEDURE — 1123F ACP DISCUSS/DSCN MKR DOCD: CPT | Performed by: INTERNAL MEDICINE

## 2022-11-25 PROCEDURE — 36415 COLL VENOUS BLD VENIPUNCTURE: CPT

## 2022-11-25 PROCEDURE — 85025 COMPLETE CBC W/AUTO DIFF WBC: CPT

## 2022-11-25 PROCEDURE — 96365 THER/PROPH/DIAG IV INF INIT: CPT

## 2022-11-25 PROCEDURE — 3078F DIAST BP <80 MM HG: CPT | Performed by: INTERNAL MEDICINE

## 2022-11-25 PROCEDURE — G8484 FLU IMMUNIZE NO ADMIN: HCPCS | Performed by: INTERNAL MEDICINE

## 2022-11-25 PROCEDURE — G8400 PT W/DXA NO RESULTS DOC: HCPCS | Performed by: INTERNAL MEDICINE

## 2022-11-25 PROCEDURE — G8427 DOCREV CUR MEDS BY ELIG CLIN: HCPCS | Performed by: INTERNAL MEDICINE

## 2022-11-25 PROCEDURE — 80053 COMPREHEN METABOLIC PANEL: CPT

## 2022-11-25 PROCEDURE — G8417 CALC BMI ABV UP PARAM F/U: HCPCS | Performed by: INTERNAL MEDICINE

## 2022-11-25 PROCEDURE — 6360000002 HC RX W HCPCS: Performed by: INTERNAL MEDICINE

## 2022-11-25 PROCEDURE — 2580000003 HC RX 258: Performed by: INTERNAL MEDICINE

## 2022-11-25 PROCEDURE — 1036F TOBACCO NON-USER: CPT | Performed by: INTERNAL MEDICINE

## 2022-11-25 PROCEDURE — 3074F SYST BP LT 130 MM HG: CPT | Performed by: INTERNAL MEDICINE

## 2022-11-25 RX ORDER — SODIUM CHLORIDE 0.9 % (FLUSH) 0.9 %
5-40 SYRINGE (ML) INJECTION PRN
OUTPATIENT
Start: 2022-11-25

## 2022-11-25 RX ORDER — ALBUTEROL SULFATE 90 UG/1
4 AEROSOL, METERED RESPIRATORY (INHALATION) PRN
OUTPATIENT
Start: 2022-11-25

## 2022-11-25 RX ORDER — SODIUM CHLORIDE 9 MG/ML
5-250 INJECTION, SOLUTION INTRAVENOUS PRN
Status: DISCONTINUED | OUTPATIENT
Start: 2022-11-25 | End: 2022-11-26 | Stop reason: HOSPADM

## 2022-11-25 RX ORDER — HEPARIN SODIUM (PORCINE) LOCK FLUSH IV SOLN 100 UNIT/ML 100 UNIT/ML
500 SOLUTION INTRAVENOUS PRN
OUTPATIENT
Start: 2022-11-25

## 2022-11-25 RX ORDER — SODIUM CHLORIDE 9 MG/ML
5-250 INJECTION, SOLUTION INTRAVENOUS PRN
OUTPATIENT
Start: 2022-11-25

## 2022-11-25 RX ORDER — SODIUM CHLORIDE 9 MG/ML
INJECTION, SOLUTION INTRAVENOUS CONTINUOUS
OUTPATIENT
Start: 2022-11-25

## 2022-11-25 RX ORDER — SODIUM CHLORIDE 9 MG/ML
5-250 INJECTION, SOLUTION INTRAVENOUS PRN
Status: CANCELLED | OUTPATIENT
Start: 2022-11-25

## 2022-11-25 RX ORDER — DIPHENHYDRAMINE HYDROCHLORIDE 50 MG/ML
50 INJECTION INTRAMUSCULAR; INTRAVENOUS
Status: DISCONTINUED | OUTPATIENT
Start: 2022-11-25 | End: 2022-11-26 | Stop reason: HOSPADM

## 2022-11-25 RX ORDER — SODIUM CHLORIDE 0.9 % (FLUSH) 0.9 %
5-40 SYRINGE (ML) INJECTION PRN
Status: DISCONTINUED | OUTPATIENT
Start: 2022-11-25 | End: 2022-11-26 | Stop reason: HOSPADM

## 2022-11-25 RX ORDER — ONDANSETRON 2 MG/ML
8 INJECTION INTRAMUSCULAR; INTRAVENOUS
OUTPATIENT
Start: 2022-11-25

## 2022-11-25 RX ORDER — DIPHENHYDRAMINE HYDROCHLORIDE 50 MG/ML
50 INJECTION INTRAMUSCULAR; INTRAVENOUS
OUTPATIENT
Start: 2022-11-25

## 2022-11-25 RX ORDER — ACETAMINOPHEN 325 MG/1
650 TABLET ORAL
OUTPATIENT
Start: 2022-11-25

## 2022-11-25 RX ORDER — EPINEPHRINE 1 MG/ML
0.3 INJECTION, SOLUTION, CONCENTRATE INTRAVENOUS PRN
OUTPATIENT
Start: 2022-11-25

## 2022-11-25 RX ADMIN — SODIUM CHLORIDE, PRESERVATIVE FREE 10 ML: 5 INJECTION INTRAVENOUS at 10:58

## 2022-11-25 RX ADMIN — SODIUM CHLORIDE 100 ML/HR: 9 INJECTION, SOLUTION INTRAVENOUS at 10:58

## 2022-11-25 RX ADMIN — FERRIC CARBOXYMALTOSE INJECTION 750 MG: 50 INJECTION, SOLUTION INTRAVENOUS at 11:14

## 2022-11-25 ASSESSMENT — PATIENT HEALTH QUESTIONNAIRE - PHQ9
SUM OF ALL RESPONSES TO PHQ QUESTIONS 1-9: 0
2. FEELING DOWN, DEPRESSED OR HOPELESS: 0
SUM OF ALL RESPONSES TO PHQ QUESTIONS 1-9: 0
SUM OF ALL RESPONSES TO PHQ9 QUESTIONS 1 & 2: 0
1. LITTLE INTEREST OR PLEASURE IN DOING THINGS: 0

## 2022-11-25 NOTE — PATIENT INSTRUCTIONS
Patient Instructions from Today's Visit    Reason for Visit:  Follow up - Macrocytic Anemia     Diagnosis Information:  [email or web address]-us/[email or web address]-answers-patient-education-materials/pehu-zxkwpmv-ihvt-sheets    Plan:  Bone marrow biopsy results reviewed - there was no evidence of a bone marrow disorder. Your hemoglobin has come up - the second dose should further increase this. Your anemia is likely related to chronic kidney disease and malabsorption. Continue taking Potassium, iron, and folate. Please call us if you have any questions or concerns before your next visit. Follow Up: Follow up in 2 months, with labs prior.      Recent Lab Results:  Hospital Outpatient Visit on 11/25/2022   Component Date Value Ref Range Status    WBC 11/25/2022 5.7  4.3 - 11.1 K/uL Final    RBC 11/25/2022 3.01 (A)  4.05 - 5.2 M/uL Final    Hemoglobin 11/25/2022 9.1 (A)  11.7 - 15.4 g/dL Final    Hematocrit 11/25/2022 29.2 (A)  35.8 - 46.3 % Final    MCV 11/25/2022 97.0  82.0 - 102.0 FL Final    MCH 11/25/2022 30.2  26.1 - 32.9 PG Final    MCHC 11/25/2022 31.2 (A)  31.4 - 35.0 g/dL Final    RDW 11/25/2022 16.3 (A)  11.9 - 14.6 % Final    Platelets 24/07/8167 209  150 - 450 K/uL Final    MPV 11/25/2022 9.9  9.4 - 12.3 FL Final    nRBC 11/25/2022 0.00  0.0 - 0.2 K/uL Final    **Note: Absolute NRBC parameter is now reported with Hemogram**    Seg Neutrophils 11/25/2022 47  43 - 78 % Final    Lymphocytes 11/25/2022 41  13 - 44 % Final    Monocytes 11/25/2022 6  4.0 - 12.0 % Final    Eosinophils % 11/25/2022 4  0.5 - 7.8 % Final    Basophils 11/25/2022 1  0.0 - 2.0 % Final    Immature Granulocytes 11/25/2022 1  0.0 - 5.0 % Final    Segs Absolute 11/25/2022 2.7  1.7 - 8.2 K/UL Final    Absolute Lymph # 11/25/2022 2.3  0.5 - 4.6 K/UL Final    Absolute Mono # 11/25/2022 0.3  0.1 - 1.3 K/UL Final    Absolute Eos # 11/25/2022 0.2  0.0 - 0.8 K/UL Final    Basophils Absolute 11/25/2022 0.1  0.0 - 0.2 K/UL Final    Absolute Immature Granulocyte 11/25/2022 0.1  0.0 - 0.5 K/UL Final    Differential Type 11/25/2022 AUTOMATED    Final    Sodium 11/25/2022 140  133 - 143 mmol/L Final    Potassium 11/25/2022 3.2 (A)  3.5 - 5.1 mmol/L Final    Chloride 11/25/2022 110  101 - 110 mmol/L Final    CO2 11/25/2022 25  21 - 32 mmol/L Final    Anion Gap 11/25/2022 5  2 - 11 mmol/L Final    Glucose 11/25/2022 88  65 - 100 mg/dL Final    BUN 11/25/2022 16  8 - 23 MG/DL Final    Creatinine 11/25/2022 1.50 (A)  0.6 - 1.0 MG/DL Final    Est, Glom Filt Rate 11/25/2022 35 (A)  >60 ml/min/1.73m2 Final    Comment:      Pediatric calculator link: [employer]. org/professionals/kdoqi/gfr_calculatorped       Effective Oct 3, 2022       These results are not intended for use in patients <25years of age. eGFR results are calculated without a race factor using  the 2021 CKD-EPI equation. Careful clinical correlation is recommended, particularly when comparing to results calculated using previous equations. The CKD-EPI equation is less accurate in patients with extremes of muscle mass, extra-renal metabolism of creatinine, excessive creatine ingestion, or following therapy that affects renal tubular secretion.       Calcium 11/25/2022 9.1  8.3 - 10.4 MG/DL Final    Total Bilirubin 11/25/2022 0.4  0.2 - 1.1 MG/DL Final    ALT 11/25/2022 14  12 - 65 U/L Final    AST 11/25/2022 24  15 - 37 U/L Final    Alk Phosphatase 11/25/2022 62  50 - 136 U/L Final    Total Protein 11/25/2022 7.8  6.3 - 8.2 g/dL Final    Albumin 11/25/2022 3.4  3.2 - 4.6 g/dL Final    Globulin 11/25/2022 4.4  2.8 - 4.5 g/dL Final    Albumin/Globulin Ratio 11/25/2022 0.8  0.4 - 1.6   Final         Treatment Summary has been discussed and given to patient: N/A        -------------------------------------------------------------------------------------------------------------------  Please call our office at [phone] if you have any  of the following symptoms:   Fever of 100.5 or greater  Chills  Shortness of breath  Swelling or pain in one leg    After office hours an answering service is available and will contact a provider for emergencies or if you are experiencing any of the above symptoms. Patient does express an interest in My Chart. My Chart log in information explained on the after visit summary printout at [facility].     Damon Caruso RN

## 2022-11-25 NOTE — PROGRESS NOTES
Arrived to the [facility]. Injectafer completed. Patient tolerated without reaction. Any issues or concerns during appointment: None. Observed x 30 minutes without complications. Patient aware of next lab and [facility] office visit on 01/27/2023 (date) at 80 followed by OV at [phone] (time). Patient instructed to call provider with temperature of 100.4 or greater or nausea/vomiting/ diarrhea or pain not controlled by medications  Discharged Ambulatory to home. No further Infusion appointments noted.

## 2022-12-10 DIAGNOSIS — E78.5 HYPERLIPIDEMIA LDL GOAL <100: Primary | ICD-10-CM

## 2022-12-12 RX ORDER — TRIAMTERENE AND HYDROCHLOROTHIAZIDE 75; 50 MG/1; MG/1
TABLET ORAL
Qty: 90 TABLET | Refills: 1 | OUTPATIENT
Start: 2022-12-12

## 2022-12-12 RX ORDER — EZETIMIBE 10 MG/1
TABLET ORAL
Qty: 90 TABLET | Refills: 0 | Status: SHIPPED | OUTPATIENT
Start: 2022-12-12

## 2022-12-12 RX ORDER — SIMVASTATIN 40 MG
TABLET ORAL
Qty: 90 TABLET | Refills: 0 | Status: SHIPPED | OUTPATIENT
Start: 2022-12-12

## 2022-12-12 NOTE — TELEPHONE ENCOUNTER
Please contact patient. It appears that the cardiologist stopped the triamterene-HCTZ for blood pressure. Please confirm whether or not she is taking this medication.

## 2023-01-26 NOTE — PROGRESS NOTES
[facility] Hematology and Oncology: Office Visit Established Patient    Reason for follow up:    Macrocytic anemia    Overview: (copied from prior)    Ms. Sloane Zhou is a 65years old female patient with history of hypertension, hyperlipidemia, chronic kidney disease, colon polyps, total abdominal hysterectomy with bilateral salpingo-oophorectomy and spontaneous pneumothorax who has been referred for evaluation of microcytic anemia. On 8/26/202022, she presented to her PCP with complaints of fatigue and shortness of breath with activity. Hemoglobin was down to 8.3 from 11 (in March 2022). On evaluation today, patient reports moderate persistent fatigue and exertional dyspnea with onset a few months ago. She denies fevers, night sweats, chest pain, bloody/black stools, nausea, vomiting, change in appetite, heartburn, headache, gait disturbances, tingling/numbness or weakness in any part of her body. She denies any swollen glands easy bleeding or bruising. She has intentionally lost about 24 pounds over the course of last few months by following healthier eating habits. She is scheduled to undergo EGD and colonoscopy on 10/19/2022. 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 EGD showed gastritis determined by biopsy. Interval history:  Patient returns for follow-up today. Since last visit, she has done well in general.  Hemoglobin has remained stable. However there has not been any significant improvement in hemoglobin with adequate iron supplementation as indicated by ferritin of 458. She denies any chest pain, unusual shortness of breath, abdominal pain, nausea, vomiting, heartburn, black/bloody stools. Her weight has been stable. Review of Systems:  14 point ROS was negative except as per HPI      ECOG PERFORMANCE STATUS - 0-Fully active, able to carry on all pre-disease performance without restriction. Pain - /10.  None/Minimal pain - not affecting QOL     Fatigue - No flowsheet data found. Distress - No flowsheet data found. 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, diverticula colon, internal hemorrhoids       Reviewed and updated this visit by provider:  Tobacco  Allergies  Meds  Problems  Med Hx  Surg Hx  Fam Hx          Current Outpatient Medications   Medication Sig Dispense Refill    simvastatin (ZOCOR) 40 MG tablet TAKE ONE TABLET BY MOUTH EVERY NIGHT 90 tablet 0    ezetimibe (ZETIA) 10 MG tablet TAKE ONE TABLET BY MOUTH ONE TIME DAILY 90 tablet 0    folic acid (FOLVITE) 1 MG tablet Take 1 tablet by mouth daily 90 tablet 1    potassium chloride (KLOR-CON M) 20 MEQ extended release tablet Take 2 tablets by mouth 2 times daily 360 tablet 1    ferrous sulfate (IRON 325) 325 (65 Fe) MG tablet Take 325 mg by mouth daily      hydrALAZINE (APRESOLINE) 25 MG tablet Take 1 tablet by mouth 3 times daily 270 tablet 3    Multiple Vitamins-Minerals (PRESERVISION AREDS 2 PO) Take by mouth      aspirin 81 MG EC tablet Take by mouth daily      Biotin 2.5 MG TABS Take 5,000 mcg by mouth daily      Cholecalciferol 50 MCG (2000 UT) TABS Take by mouth      nitroGLYCERIN (NITROSTAT) 0.4 MG SL tablet Place 0.4 mg under the tongue      omeprazole (PRILOSEC) 20 MG delayed release capsule Take 20 mg by mouth daily as needed       No current facility-administered medications for this visit.         OBJECTIVE:  /64 (Site: Right Upper Arm, Position: Sitting, Cuff Size: Medium Adult)   Pulse 67   Temp 97.9 °F (36.6 °C) (Oral)   Resp 19   Ht 5' 2\" (1.575 m)   Wt 178 lb 9.6 oz (81 kg)   SpO2 97%   BMI 32.67 kg/m²   Wt Readings from Last 3 Encounters:   01/27/23 178 lb 9.6 oz (81 kg)   11/25/22 179 lb (81.2 kg)   11/11/22 179 lb (81.2 kg)       Physical Exam:  Patient alert and oriented x 3, in no acute distress  Integumentary: No Pallor, no icterus  HEENT: Moist mucous membranes, normal oropharynx  Cardiovascular:RRR, S1, S2 present, + systolic murmur  Lungs: Clear to auscultation, no rales or wheezing, no accessory muscle use  Abdomen: Soft, and non-tender on palpation, no organomegaly, bowel sounds audible  Extremities: No peripheral edema, no joint swelling  Neurological: patient can follow commands and move all extremities    Labs:  Lab Results   Component Value Date    WBC 4.0 (L) 01/27/2023    HGB 9.5 (L) 01/27/2023    HCT 29.4 (L) 01/27/2023    .7 01/27/2023     01/27/2023     Lab Results   Component Value Date    NEUTROABS 2.0 02/18/2022    LYMPHOPCT 36 01/27/2023    LYMPHSABS 1.4 01/27/2023    MONOPCT 6 01/27/2023    MONOSABS 0.3 01/27/2023    EOSABS 0.1 01/27/2023    BASOPCT 2 01/27/2023     Lab Results   Component Value Date     01/27/2023    K 3.8 01/27/2023     01/27/2023    CO2 26 01/27/2023    BUN 20 01/27/2023    CREATININE 1.60 (H) 01/27/2023    GLUCOSE 81 01/27/2023    CALCIUM 9.0 01/27/2023    PROT 7.8 01/27/2023    LABALBU 3.4 01/27/2023    BILITOT 0.4 01/27/2023    ALKPHOS 67 01/27/2023    AST 23 01/27/2023    ALT 14 01/27/2023    LABGLOM 33 (L) 01/27/2023    GFRAA 47 (L) 09/23/2022    AGRATIO 1.3 02/18/2022    GLOB 4.4 01/27/2023     Lab Results   Component Value Date    IRON 110 01/27/2023    TIBC 243 (L) 01/27/2023    FERRITIN 458 (H) 01/27/2023         Polyclonal increase detected in 1 or more immunoglobulins on SPEP  Kappa lambda ratio normal-1.41  UPEP/HELLEN unremarkable  No evidence of hemolysis on labs. Methylmalonic acid level normal, homocystine elevated-pattern suggestive of folate deficiency     BONE MARROW SYNOPTIC REPORT     SPECIMEN:  Peripheral blood smear, bone marrow aspiration, bone marrow   aspirate clot, bone marrow core biopsy, bone marrow core touch   preparation. PROCEDURE:  Aspiration, biopsy.    ASPIRATION SITE:  Posterior iliac crest.   BIOPSY SITE:  Posterior iliac crest.   HISTOLOGIC TYPE:  No definitive evidence of a bone marrow disorder   IMMUNOPHENOTYPING:        Flow cytometric analysis shows no immunophenotypic abnormalities. Immunohistochemical stains show no increase in blasts. CYTOGENIC STUDIES:  Normal female karyotype. FLUORESCENCE IN SITU HYBRIDIZATION:  Normal results. MOLECULAR GENETIC STUDIES:          Myeloid Disorders Profile:  No pathogenic mutations are detected. COMMENT:  The bone marrow is normocellular for age with no definitive   evidence of a bone marrow disorder. In the absence of cytogenetics or   molecular abnormalities, the diagnosis of MDS is less likely. Imaging:  No results found. Problems:  Normocytic anemia, bm bx : No definitive morphologic evidence of a bone marrow disorder   Inappropriately low Ferritin in the setting of stage III CKD  2. Stage 3 chronic kidney disease, unspecified whether stage 3a or 3b CKD ([facility] [address]    3. Labs are suggestive of iron deficiency despite taking daily oral iron. 4. Other general symptoms and signs     5. Macrocytosis    History of colonic polyps. Folate deficiency  Hypertension  Hyperlipidemia  GERD     Leukocytes and Plt preserved. PLAN:  Bone marrow biopsy: No definitive morphologic evidence of a bone marrow disorder    c/w folic acid. Proceed with IV iron as planned. Recent EGD and colonoscopy did not reveal any active source of bleeding. Capsule endoscopy may be considered for further work-up.  -She will continue taking potassium chloride, hydralazine, simvastatin, aspirin, vitamin D and Prilosec. 1/27/2023: Patient is clinically stable. Most recent iron studies are suggestive of anemia of chronic disease likely related to her CKD. Further IV iron supplementation is not felt indicated at this time. After a thorough discussion, we decided to proceed with biweekly Retacrit injections at 10,000 units subq per dose. Target hemoglobin 10-11. I shall see her back in one month to assess efficacy of the above intervention.   She will continue taking potassium chloride, hydralazine, simvastatin, aspirin, vitamin D and Prilosec for other medical conditions listed above. Cheryl Dempsey MD  [facility] Hematology and Oncology  [address] [place] [address]  Office : [phone]  Fax : [phone]    Elements of this note have been dictated using speech recognition software. As a result, errors of speech recognition may have occurred.

## 2023-01-27 ENCOUNTER — OFFICE VISIT (OUTPATIENT)
Dept: ONCOLOGY | Age: 78
End: 2023-01-27
Payer: MEDICARE

## 2023-01-27 ENCOUNTER — HOSPITAL ENCOUNTER (OUTPATIENT)
Dept: LAB | Age: 78
End: 2023-01-27
Payer: MEDICARE

## 2023-01-27 VITALS
BODY MASS INDEX: 32.87 KG/M2 | RESPIRATION RATE: 19 BRPM | TEMPERATURE: 97.9 F | HEIGHT: 62 IN | SYSTOLIC BLOOD PRESSURE: 130 MMHG | HEART RATE: 67 BPM | WEIGHT: 178.6 LBS | DIASTOLIC BLOOD PRESSURE: 64 MMHG | OXYGEN SATURATION: 97 %

## 2023-01-27 DIAGNOSIS — N18.30 STAGE 3 CHRONIC KIDNEY DISEASE, UNSPECIFIED WHETHER STAGE 3A OR 3B CKD (HCC): ICD-10-CM

## 2023-01-27 DIAGNOSIS — D64.9 ANEMIA, UNSPECIFIED TYPE: ICD-10-CM

## 2023-01-27 DIAGNOSIS — D50.9 IRON DEFICIENCY ANEMIA, UNSPECIFIED IRON DEFICIENCY ANEMIA TYPE: ICD-10-CM

## 2023-01-27 LAB
ALBUMIN SERPL-MCNC: 3.4 G/DL (ref 3.2–4.6)
ALBUMIN/GLOB SERPL: 0.8 (ref 0.4–1.6)
ALP SERPL-CCNC: 67 U/L (ref 50–136)
ALT SERPL-CCNC: 14 U/L (ref 12–65)
ANION GAP SERPL CALC-SCNC: 7 MMOL/L (ref 2–11)
AST SERPL-CCNC: 23 U/L (ref 15–37)
BASOPHILS # BLD: 0.1 K/UL (ref 0–0.2)
BASOPHILS NFR BLD: 2 % (ref 0–2)
BILIRUB SERPL-MCNC: 0.4 MG/DL (ref 0.2–1.1)
BUN SERPL-MCNC: 20 MG/DL (ref 8–23)
CALCIUM SERPL-MCNC: 9 MG/DL (ref 8.3–10.4)
CHLORIDE SERPL-SCNC: 106 MMOL/L (ref 101–110)
CO2 SERPL-SCNC: 26 MMOL/L (ref 21–32)
CREAT SERPL-MCNC: 1.6 MG/DL (ref 0.6–1)
DIFFERENTIAL METHOD BLD: ABNORMAL
EOSINOPHIL # BLD: 0.1 K/UL (ref 0–0.8)
EOSINOPHIL NFR BLD: 4 % (ref 0.5–7.8)
ERYTHROCYTE [DISTWIDTH] IN BLOOD BY AUTOMATED COUNT: 14.6 % (ref 11.9–14.6)
FERRITIN SERPL-MCNC: 458 NG/ML (ref 8–388)
GLOBULIN SER CALC-MCNC: 4.4 G/DL (ref 2.8–4.5)
GLUCOSE SERPL-MCNC: 81 MG/DL (ref 65–100)
HCT VFR BLD AUTO: 29.4 % (ref 35.8–46.3)
HGB BLD-MCNC: 9.5 G/DL (ref 11.7–15.4)
IMM GRANULOCYTES # BLD AUTO: 0 K/UL (ref 0–0.5)
IMM GRANULOCYTES NFR BLD AUTO: 1 % (ref 0–5)
IRON SATN MFR SERPL: 45 %
IRON SERPL-MCNC: 110 UG/DL (ref 35–150)
LYMPHOCYTES # BLD: 1.4 K/UL (ref 0.5–4.6)
LYMPHOCYTES NFR BLD: 36 % (ref 13–44)
MCH RBC QN AUTO: 32.9 PG (ref 26.1–32.9)
MCHC RBC AUTO-ENTMCNC: 32.3 G/DL (ref 31.4–35)
MCV RBC AUTO: 101.7 FL (ref 82–102)
MONOCYTES # BLD: 0.3 K/UL (ref 0.1–1.3)
MONOCYTES NFR BLD: 6 % (ref 4–12)
NEUTS SEG # BLD: 2 K/UL (ref 1.7–8.2)
NEUTS SEG NFR BLD: 52 % (ref 43–78)
NRBC # BLD: 0 K/UL (ref 0–0.2)
PLATELET # BLD AUTO: 183 K/UL (ref 150–450)
PMV BLD AUTO: 9.9 FL (ref 9.4–12.3)
POTASSIUM SERPL-SCNC: 3.8 MMOL/L (ref 3.5–5.1)
PROT SERPL-MCNC: 7.8 G/DL (ref 6.3–8.2)
RBC # BLD AUTO: 2.89 M/UL (ref 4.05–5.2)
SODIUM SERPL-SCNC: 139 MMOL/L (ref 133–143)
TIBC SERPL-MCNC: 243 UG/DL (ref 250–450)
WBC # BLD AUTO: 4 K/UL (ref 4.3–11.1)

## 2023-01-27 PROCEDURE — 85025 COMPLETE CBC W/AUTO DIFF WBC: CPT

## 2023-01-27 PROCEDURE — 82728 ASSAY OF FERRITIN: CPT

## 2023-01-27 PROCEDURE — 99214 OFFICE O/P EST MOD 30 MIN: CPT | Performed by: INTERNAL MEDICINE

## 2023-01-27 PROCEDURE — 36415 COLL VENOUS BLD VENIPUNCTURE: CPT

## 2023-01-27 PROCEDURE — G8427 DOCREV CUR MEDS BY ELIG CLIN: HCPCS | Performed by: INTERNAL MEDICINE

## 2023-01-27 PROCEDURE — 1036F TOBACCO NON-USER: CPT | Performed by: INTERNAL MEDICINE

## 2023-01-27 PROCEDURE — 80053 COMPREHEN METABOLIC PANEL: CPT

## 2023-01-27 PROCEDURE — G8417 CALC BMI ABV UP PARAM F/U: HCPCS | Performed by: INTERNAL MEDICINE

## 2023-01-27 PROCEDURE — 3078F DIAST BP <80 MM HG: CPT | Performed by: INTERNAL MEDICINE

## 2023-01-27 PROCEDURE — 1123F ACP DISCUSS/DSCN MKR DOCD: CPT | Performed by: INTERNAL MEDICINE

## 2023-01-27 PROCEDURE — 3075F SYST BP GE 130 - 139MM HG: CPT | Performed by: INTERNAL MEDICINE

## 2023-01-27 PROCEDURE — 1090F PRES/ABSN URINE INCON ASSESS: CPT | Performed by: INTERNAL MEDICINE

## 2023-01-27 PROCEDURE — G8484 FLU IMMUNIZE NO ADMIN: HCPCS | Performed by: INTERNAL MEDICINE

## 2023-01-27 PROCEDURE — 83540 ASSAY OF IRON: CPT

## 2023-01-27 PROCEDURE — G8400 PT W/DXA NO RESULTS DOC: HCPCS | Performed by: INTERNAL MEDICINE

## 2023-01-27 ASSESSMENT — PATIENT HEALTH QUESTIONNAIRE - PHQ9
2. FEELING DOWN, DEPRESSED OR HOPELESS: 0
SUM OF ALL RESPONSES TO PHQ QUESTIONS 1-9: 0
1. LITTLE INTEREST OR PLEASURE IN DOING THINGS: 0
SUM OF ALL RESPONSES TO PHQ QUESTIONS 1-9: 0
SUM OF ALL RESPONSES TO PHQ9 QUESTIONS 1 & 2: 0

## 2023-01-27 NOTE — PATIENT INSTRUCTIONS
Patient Instructions from Today's Visit    Reason for Visit:  Follow up - Anemia     Diagnosis Information:  [email or web address] [email or web address]-[email or web address]-answers-[email or web address]-education-materials/tyrb-letfkow-[email or web address]-sheets    Plan:  You hemoglobin is 9.5 today. Iron studies are still pending. The anemia is likely related to chronic kidney disease - we will schedule you for Retacrit/Procrit every 2 weeks. Please call us if you have any questions or concerns before your next visit. Follow Up: Follow up in 4 weeks with Dr. Yuki Banegas, with labs prior  Infusion after office visit for Procrit/Retacrit.      Recent Lab Results:  Hospital Outpatient Visit on 01/27/2023   Component Date Value Ref Range Status    WBC 01/27/2023 4.0 (A)  4.3 - 11.1 K/uL Final    RBC 01/27/2023 2.89 (A)  4.05 - 5.2 M/uL Final    Hemoglobin 01/27/2023 9.5 (A)  11.7 - 15.4 g/dL Final    Hematocrit 01/27/2023 29.4 (A)  35.8 - 46.3 % Final    MCV 01/27/2023 101.7  82.0 - 102.0 FL Final    MCH 01/27/2023 32.9  26.1 - 32.9 PG Final    MCHC 01/27/2023 32.3  31.4 - 35.0 g/dL Final    RDW 01/27/2023 14.6  11.9 - 14.6 % Final    Platelets 35/23/6056 183  150 - 450 K/uL Final    MPV 01/27/2023 9.9  9.4 - 12.3 FL Final    nRBC 01/27/2023 0.00  0.0 - 0.2 K/uL Final    **Note: Absolute NRBC parameter is now reported with Hemogram**    Differential Type 01/27/2023 AUTOMATED    Final    Seg Neutrophils 01/27/2023 52  43 - 78 % Final    Lymphocytes 01/27/2023 36  13 - 44 % Final    Monocytes 01/27/2023 6  4.0 - 12.0 % Final    Eosinophils % 01/27/2023 4  0.5 - 7.8 % Final    Basophils 01/27/2023 2  0.0 - 2.0 % Final    Immature Granulocytes 01/27/2023 1  0.0 - 5.0 % Final    Segs Absolute 01/27/2023 2.0  1.7 - 8.2 K/UL Final    Absolute Lymph # 01/27/2023 1.4  0.5 - 4.6 K/UL Final    Absolute Mono # 01/27/2023 0.3  0.1 - 1.3 K/UL Final    Absolute Eos # 01/27/2023 0.1  0.0 - 0.8 K/UL Final    Basophils Absolute 01/27/2023 0.1  0.0 - 0.2 K/UL Final    Absolute Immature Granulocyte 01/27/2023 0.0  0.0 - 0.5 K/UL Final         Treatment Summary has been discussed and given to patient: N/A        -------------------------------------------------------------------------------------------------------------------  Please call our office at [phone] if you have any  of the following symptoms:   Fever of 100.5 or greater  Chills  Shortness of breath  Swelling or pain in one leg    After office hours an answering service is available and will contact a provider for emergencies or if you are experiencing any of the above symptoms. Patient does express an interest in My Chart. My Chart log in information explained on the after visit summary printout at the . [facility] [address] desk.     Violeta Jones RN

## 2023-02-02 DIAGNOSIS — D64.9 ANEMIA, UNSPECIFIED TYPE: Primary | ICD-10-CM

## 2023-02-02 DIAGNOSIS — N18.32 CHRONIC KIDNEY DISEASE, STAGE 3B (HCC): ICD-10-CM

## 2023-02-02 RX ORDER — ONDANSETRON 2 MG/ML
8 INJECTION INTRAMUSCULAR; INTRAVENOUS
OUTPATIENT
Start: 2023-02-03

## 2023-02-02 RX ORDER — EPINEPHRINE 1 MG/ML
0.3 INJECTION, SOLUTION, CONCENTRATE INTRAVENOUS PRN
OUTPATIENT
Start: 2023-02-03

## 2023-02-02 RX ORDER — ACETAMINOPHEN 325 MG/1
650 TABLET ORAL
OUTPATIENT
Start: 2023-02-03

## 2023-02-02 RX ORDER — FAMOTIDINE 10 MG/ML
20 INJECTION, SOLUTION INTRAVENOUS
OUTPATIENT
Start: 2023-02-03

## 2023-02-02 RX ORDER — DIPHENHYDRAMINE HYDROCHLORIDE 50 MG/ML
50 INJECTION INTRAMUSCULAR; INTRAVENOUS
OUTPATIENT
Start: 2023-02-03

## 2023-02-02 RX ORDER — ALBUTEROL SULFATE 90 UG/1
4 AEROSOL, METERED RESPIRATORY (INHALATION) PRN
OUTPATIENT
Start: 2023-02-03

## 2023-02-02 RX ORDER — SODIUM CHLORIDE 9 MG/ML
INJECTION, SOLUTION INTRAVENOUS CONTINUOUS
OUTPATIENT
Start: 2023-02-03

## 2023-02-03 ENCOUNTER — HOSPITAL ENCOUNTER (OUTPATIENT)
Dept: LAB | Age: 78
Discharge: HOME | End: 2023-02-06

## 2023-02-03 ENCOUNTER — HOSPITAL ENCOUNTER (OUTPATIENT)
Dept: INFUSION THERAPY | Age: 78
Discharge: HOME | End: 2023-02-03
Payer: MEDICARE

## 2023-02-03 VITALS
OXYGEN SATURATION: 97 % | TEMPERATURE: 97.5 F | HEART RATE: 63 BPM | RESPIRATION RATE: 18 BRPM | SYSTOLIC BLOOD PRESSURE: 121 MMHG | DIASTOLIC BLOOD PRESSURE: 69 MMHG

## 2023-02-03 DIAGNOSIS — D64.9 ANEMIA, UNSPECIFIED TYPE: Primary | ICD-10-CM

## 2023-02-03 DIAGNOSIS — N18.32 CHRONIC KIDNEY DISEASE, STAGE 3B (HCC): ICD-10-CM

## 2023-02-03 LAB
ERYTHROCYTE [DISTWIDTH] IN BLOOD BY AUTOMATED COUNT: 14.3 % (ref 11.9–14.6)
HCT VFR BLD AUTO: 30.7 % (ref 35.8–46.3)
HGB BLD-MCNC: 9.8 G/DL (ref 11.7–15.4)
MCH RBC QN AUTO: 32.9 PG (ref 26.1–32.9)
MCHC RBC AUTO-ENTMCNC: 31.9 G/DL (ref 31.4–35)
MCV RBC AUTO: 103 FL (ref 82–102)
NRBC # BLD: 0 K/UL (ref 0–0.2)
PLATELET # BLD AUTO: 176 K/UL (ref 150–450)
PMV BLD AUTO: 9.7 FL (ref 9.4–12.3)
RBC # BLD AUTO: 2.98 M/UL (ref 4.05–5.2)
WBC # BLD AUTO: 3.8 K/UL (ref 4.3–11.1)

## 2023-02-03 PROCEDURE — 96372 THER/PROPH/DIAG INJ SC/IM: CPT

## 2023-02-03 PROCEDURE — 85027 COMPLETE CBC AUTOMATED: CPT

## 2023-02-03 PROCEDURE — 6360000002 HC RX W HCPCS: Performed by: INTERNAL MEDICINE

## 2023-02-03 PROCEDURE — 36415 COLL VENOUS BLD VENIPUNCTURE: CPT

## 2023-02-03 RX ORDER — SODIUM CHLORIDE 9 MG/ML
INJECTION, SOLUTION INTRAVENOUS CONTINUOUS
OUTPATIENT
Start: 2023-02-10

## 2023-02-03 RX ORDER — ALBUTEROL SULFATE 90 UG/1
4 AEROSOL, METERED RESPIRATORY (INHALATION) PRN
OUTPATIENT
Start: 2023-02-10

## 2023-02-03 RX ORDER — ACETAMINOPHEN 325 MG/1
650 TABLET ORAL
OUTPATIENT
Start: 2023-02-10

## 2023-02-03 RX ORDER — DIPHENHYDRAMINE HYDROCHLORIDE 50 MG/ML
50 INJECTION INTRAMUSCULAR; INTRAVENOUS
OUTPATIENT
Start: 2023-02-10

## 2023-02-03 RX ORDER — EPINEPHRINE 1 MG/ML
0.3 INJECTION, SOLUTION, CONCENTRATE INTRAVENOUS PRN
OUTPATIENT
Start: 2023-02-10

## 2023-02-03 RX ORDER — ONDANSETRON 2 MG/ML
8 INJECTION INTRAMUSCULAR; INTRAVENOUS
OUTPATIENT
Start: 2023-02-10

## 2023-02-03 RX ADMIN — EPOETIN ALFA-EPBX 10000 UNITS: 10000 INJECTION, SOLUTION INTRAVENOUS; SUBCUTANEOUS at 08:52

## 2023-02-03 NOTE — PROGRESS NOTES
Arrived to the [facility]. Retacrit completed. Provided education on Retacrit    Patient instructed to report any side affects to ordering provider. Patient tolerated well. Any issues or concerns during appointment: no.  Patient aware of next infusion appointment on 2/17/23 (date) at 64 (time). Discharged ambulatory.

## 2023-02-17 ENCOUNTER — NURSE ONLY (OUTPATIENT)
Dept: FAMILY MEDICINE CLINIC | Facility: CLINIC | Age: 78
End: 2023-02-17

## 2023-02-17 DIAGNOSIS — E78.5 HYPERLIPIDEMIA LDL GOAL <100: ICD-10-CM

## 2023-02-17 DIAGNOSIS — I10 ESSENTIAL HYPERTENSION: ICD-10-CM

## 2023-02-17 LAB
CHOLEST SERPL-MCNC: 170 MG/DL
HDLC SERPL-MCNC: 39 MG/DL (ref 40–60)
HDLC SERPL: 4.4
LDLC SERPL CALC-MCNC: 92.6 MG/DL
TRIGL SERPL-MCNC: 192 MG/DL (ref 35–150)
VLDLC SERPL CALC-MCNC: 38.4 MG/DL (ref 6–23)

## 2023-02-24 ENCOUNTER — OFFICE VISIT (OUTPATIENT)
Dept: FAMILY MEDICINE CLINIC | Facility: CLINIC | Age: 78
End: 2023-02-24
Payer: MEDICARE

## 2023-02-24 VITALS
SYSTOLIC BLOOD PRESSURE: 124 MMHG | WEIGHT: 176 LBS | OXYGEN SATURATION: 96 % | HEART RATE: 73 BPM | DIASTOLIC BLOOD PRESSURE: 68 MMHG | RESPIRATION RATE: 16 BRPM | HEIGHT: 62 IN | BODY MASS INDEX: 32.39 KG/M2

## 2023-02-24 DIAGNOSIS — D64.9 ANEMIA, UNSPECIFIED TYPE: Primary | ICD-10-CM

## 2023-02-24 DIAGNOSIS — D50.9 IRON DEFICIENCY ANEMIA, UNSPECIFIED IRON DEFICIENCY ANEMIA TYPE: ICD-10-CM

## 2023-02-24 DIAGNOSIS — N18.32 STAGE 3B CHRONIC KIDNEY DISEASE (HCC): ICD-10-CM

## 2023-02-24 DIAGNOSIS — E87.6 HYPOKALEMIA: ICD-10-CM

## 2023-02-24 DIAGNOSIS — I10 ESSENTIAL HYPERTENSION: Primary | ICD-10-CM

## 2023-02-24 DIAGNOSIS — E78.5 HYPERLIPIDEMIA LDL GOAL <100: ICD-10-CM

## 2023-02-24 PROBLEM — E88.89 OTHER SPECIFIED METABOLIC DISORDERS (HCC): Status: RESOLVED | Noted: 2022-09-23 | Resolved: 2023-02-24

## 2023-02-24 PROBLEM — R68.89 OTHER GENERAL SYMPTOMS AND SIGNS: Status: RESOLVED | Noted: 2022-09-23 | Resolved: 2023-02-24

## 2023-02-24 PROCEDURE — 3078F DIAST BP <80 MM HG: CPT | Performed by: FAMILY MEDICINE

## 2023-02-24 PROCEDURE — 99214 OFFICE O/P EST MOD 30 MIN: CPT | Performed by: FAMILY MEDICINE

## 2023-02-24 PROCEDURE — 1123F ACP DISCUSS/DSCN MKR DOCD: CPT | Performed by: FAMILY MEDICINE

## 2023-02-24 PROCEDURE — 1036F TOBACCO NON-USER: CPT | Performed by: FAMILY MEDICINE

## 2023-02-24 PROCEDURE — G8417 CALC BMI ABV UP PARAM F/U: HCPCS | Performed by: FAMILY MEDICINE

## 2023-02-24 PROCEDURE — 1090F PRES/ABSN URINE INCON ASSESS: CPT | Performed by: FAMILY MEDICINE

## 2023-02-24 PROCEDURE — 3074F SYST BP LT 130 MM HG: CPT | Performed by: FAMILY MEDICINE

## 2023-02-24 PROCEDURE — G8427 DOCREV CUR MEDS BY ELIG CLIN: HCPCS | Performed by: FAMILY MEDICINE

## 2023-02-24 PROCEDURE — G8400 PT W/DXA NO RESULTS DOC: HCPCS | Performed by: FAMILY MEDICINE

## 2023-02-24 PROCEDURE — G8484 FLU IMMUNIZE NO ADMIN: HCPCS | Performed by: FAMILY MEDICINE

## 2023-02-24 RX ORDER — EZETIMIBE 10 MG/1
TABLET ORAL
Qty: 90 TABLET | Refills: 3 | Status: SHIPPED | OUTPATIENT
Start: 2023-02-24

## 2023-02-24 RX ORDER — SIMVASTATIN 40 MG
TABLET ORAL
Qty: 90 TABLET | Refills: 3 | Status: SHIPPED | OUTPATIENT
Start: 2023-02-24

## 2023-02-24 SDOH — ECONOMIC STABILITY: FOOD INSECURITY: WITHIN THE PAST 12 MONTHS, THE FOOD YOU BOUGHT JUST DIDN'T LAST AND YOU DIDN'T HAVE MONEY TO GET MORE.: NEVER TRUE

## 2023-02-24 SDOH — ECONOMIC STABILITY: FOOD INSECURITY: WITHIN THE PAST 12 MONTHS, YOU WORRIED THAT YOUR FOOD WOULD RUN OUT BEFORE YOU GOT MONEY TO BUY MORE.: NEVER TRUE

## 2023-02-24 SDOH — ECONOMIC STABILITY: INCOME INSECURITY: HOW HARD IS IT FOR YOU TO PAY FOR THE VERY BASICS LIKE FOOD, HOUSING, MEDICAL CARE, AND HEATING?: NOT HARD AT ALL

## 2023-02-24 SDOH — ECONOMIC STABILITY: HOUSING INSECURITY
IN THE LAST 12 MONTHS, WAS THERE A TIME WHEN YOU DID NOT HAVE A STEADY PLACE TO SLEEP OR SLEPT IN A SHELTER (INCLUDING NOW)?: NO

## 2023-02-24 ASSESSMENT — ENCOUNTER SYMPTOMS
COUGH: 0
ABDOMINAL PAIN: 0
SHORTNESS OF BREATH: 0
NAUSEA: 0
BLOOD IN STOOL: 0
VOMITING: 0

## 2023-02-24 NOTE — PATIENT INSTRUCTIONS
Patient Education        A Healthy Lifestyle: Care Instructions  A healthy lifestyle can help you feel good, have more energy, and stay at a weight that's healthy for you. You can share a healthy lifestyle with your friends and family. And you can do it on your own. Eat meals with your friends or family. You could try cooking together. Plan activities with other people. Go for a walk with a friend, try a free online fitness class, or join a sports league. Eat a variety of healthy foods. These include fruits, vegetables, whole grains, low-fat dairy, and lean protein. Choose healthy portions of food. You can use the Nutrition Facts label on food packages as a guide. Eat more fruits and vegetables. You could add vegetables to sandwiches or add fruit to cereal.   Drink water when you are thirsty. Limit soda, juice, and sports drinks. Try to exercise most days. Aim for at least 2½ hours of exercise each week. Keep moving. Work in the garden or take your dog on a walk. Use the stairs instead of the elevator. If you use tobacco or nicotine, try to quit. Ask your doctor about programs and medicines to help you quit. Limit alcohol. Men should have no more than 2 drinks a day. Women should have no more than 1. For some people, no alcohol is the best choice. Follow-up care is a key part of your treatment and safety. Be sure to make and go to all appointments, and call your doctor if you are having problems. It's also a good idea to know your test results and keep a list of the medicines you take. Where can you learn more? Go to http://www.wilkes.com/ and enter U807 to learn more about \"A Healthy Lifestyle: Care Instructions. \"  Current as of: March 9, 2022               Content Version: 13.5  © 3907-8376 Healthwise, [employer]. Care instructions adapted under license by [facility] ([facility]).  If you have questions about a medical condition or this instruction, always ask your healthcare professional. [address] any warranty or liability for your use of this information.

## 2023-02-24 NOTE — PROGRESS NOTES
[facility],[address], DO  [address], [address] [facility] [address]   No:  [phone]  Fax:  [phone]        Assessment/Plan:   Janice Daugherty was seen today for hypertension and cholesterol problem. Diagnoses and all orders for this visit:    Essential hypertension  Blood pressure controlled. Continue hydralazine. She had recent CBC and CMP with hematology. In January. Hyperlipidemia LDL goal <100  Reviewed below lab with patient. Continue Zetia and simvastatin. -     ezetimibe (ZETIA) 10 MG tablet; TAKE ONE TABLET BY MOUTH ONE TIME DAILY for cholesterol  -     simvastatin (ZOCOR) 40 MG tablet; TAKE ONE TABLET BY MOUTH EVERY NIGHT for cholesterol    Hypokalemia  CMP reviewed from 1/27/2023 with a potassium level 3.8. Continue potassium supplement. Iron deficiency anemia, unspecified iron deficiency anemia type  Following with hematology, has upcoming iron infusions. Patient noting good response and how she is feeling    Stage 3b chronic kidney disease ([address]  Most recent GFR 33. Overall stable over the last several months. History of allergy to lisinopril. Labs:  No results found for this visit on 02/24/23.     Nurse Only on 02/17/2023   Component Date Value Ref Range Status    Cholesterol, Total 02/17/2023 170  <200 MG/DL Final    Comment: Borderline High: 200-239 mg/dL  High: Greater than or equal to 240 mg/dL      Triglycerides 02/17/2023 192 (A)  35 - 150 MG/DL Final    Comment: Borderline High: 150-199 mg/dL, High: 200-499 mg/dL  Very High: Greater than or equal to 500 mg/dL      HDL 02/17/2023 39 (A)  40 - 60 MG/DL Final    LDL Calculated 02/17/2023 92.6  <100 MG/DL Final    Comment: Near Optimal: 100-129 mg/dL  Borderline High: 130-159, High: 160-189 mg/dL  Very High: Greater than or equal to 190 mg/dL      VLDL Cholesterol Calculated 02/17/2023 38.4 (A)  6.0 - 23.0 MG/DL Final    Chol/HDL Ratio 02/17/2023 4.4    Final           Krysta Hargrove is a 65 y.o. female who is seen for evaluation of   Chief Complaint   Patient presents with    Hypertension    Cholesterol Problem       HPI:   She is here today to follow-up chronic issues. She has received iron infusions, 1 iron shot. She states she had a lot of side effects with the iron shot leading to nausea and vomiting. But since she has received the iron she states she feels much better. She did manage how poorly she felt. Blood pressure at home is well controlled about the same as it is here per her report. She is not interested in bone density at this time. She does not have any difficulties with the prescription medication she is taking. Review of Systems:  Review of Systems   Constitutional:  Negative for chills, fever and unexpected weight change. Respiratory:  Negative for cough and shortness of breath. Cardiovascular:  Negative for chest pain and leg swelling. Gastrointestinal:  Negative for abdominal pain, blood in stool, nausea and vomiting. Psychiatric/Behavioral:  The patient is not nervous/anxious.         History:  Past Medical History:   Diagnosis Date    CKD (chronic kidney disease) stage 3, GFR 30-59 ml/min ([facility])     Essential hypertension     Hyperlipidemia LDL goal <100     Menopause     S/P colonoscopy 2017 2022 to 2027    Spontaneous pneumothorax 2019    SSS (sick sinus syndrome) ([facility] [address]        Past Surgical History:   Procedure Laterality Date    BREAST BIOPSY Right     BREAST BIOPSY Left     CHOLECYSTECTOMY      COLONOSCOPY      10/2022    ESOPHAGOGASTRODUODENOSCOPY  10/2022    HYSTERECTOMY, TOTAL ABDOMINAL (CERVIX REMOVED)      MICHAEL AND BSO (CERVIX REMOVED)         Current Outpatient Medications   Medication Sig Dispense Refill    ezetimibe (ZETIA) 10 MG tablet TAKE ONE TABLET BY MOUTH ONE TIME DAILY for cholesterol 90 tablet 3    simvastatin (ZOCOR) 40 MG tablet TAKE ONE TABLET BY MOUTH EVERY NIGHT for cholesterol 90 tablet 3    folic acid (FOLVITE) 1 MG tablet Take 1 tablet by mouth daily 90 tablet 1    potassium chloride (KLOR-CON M) 20 MEQ extended release tablet Take 2 tablets by mouth 2 times daily 360 tablet 1    ferrous sulfate (IRON 325) 325 (65 Fe) MG tablet Take 325 mg by mouth daily      hydrALAZINE (APRESOLINE) 25 MG tablet Take 1 tablet by mouth 3 times daily 270 tablet 3    Multiple Vitamins-Minerals (PRESERVISION AREDS 2 PO) Take by mouth      aspirin 81 MG EC tablet Take by mouth daily      Biotin 2.5 MG TABS Take 5,000 mcg by mouth daily      Cholecalciferol 50 MCG (2000 UT) TABS Take by mouth      nitroGLYCERIN (NITROSTAT) 0.4 MG SL tablet Place 0.4 mg under the tongue      omeprazole (PRILOSEC) 20 MG delayed release capsule Take 20 mg by mouth daily as needed       No current facility-administered medications for this visit. Immunization History   Administered Date(s) Administered    COVID-19, MODERNA BLUE border, Primary or Immunocompromised, (age 12y+), IM, 100 mcg/0.5mL 01/29/2021, 02/26/2021    Influenza Virus Vaccine 10/01/2015, 11/02/2018, 09/20/2019    Influenza, FLUAD, (age 72 y+), Adjuvanted, 0.5mL 10/21/2022    Influenza, High Dose (Fluzone 65 yrs and older) 09/13/2019, 10/01/2020, 11/18/2021    Influenza, Triv, 3 Years and older, IM (Afluria (5 yrs and older) 09/29/2010    Influenza, Triv, inactivated, subunit, adjuvanted, IM (Fluad 65 yrs and older) 09/20/2019    Pneumococcal Conjugate 13-valent ([id number]) 02/09/2016    Pneumococcal Polysaccharide ([id number]) 09/20/2019    Pneumococcal Vaccine 11/25/2013    Td vaccine (adult) 02/14/2015    Tdap (Boostrix, Adacel) 02/14/2015    Zoster Recombinant (Shingrix) 09/04/2019, 03/04/2020             Vitals:    Vitals:    02/24/23 1124   BP: 124/68   Site: Left Upper Arm   Position: Sitting   Pulse: 73   Resp: 16   SpO2: 96%   Weight: 176 lb (79.8 kg)   Height: 5' 2\" (1.575 m)          Physical Exam:  Physical Exam  Vitals reviewed. Constitutional:       Appearance: Normal appearance.    HENT: Head: Normocephalic and atraumatic. Cardiovascular:      Rate and Rhythm: Normal rate and regular rhythm. Heart sounds: No murmur heard. Pulmonary:      Effort: Pulmonary effort is normal. No respiratory distress. Breath sounds: Normal breath sounds. No wheezing or rhonchi. Abdominal:      General: There is no distension. Palpations: There is no mass. Tenderness: There is no abdominal tenderness. There is no right CVA tenderness, left CVA tenderness, guarding or rebound. Hernia: No hernia is present. Musculoskeletal:      Cervical back: Neck supple. Right lower leg: No edema. Left lower leg: No edema. Lymphadenopathy:      Cervical: No cervical adenopathy. Skin:     General: Skin is warm and dry. Neurological:      Mental Status: She is alert. Psychiatric:         Mood and Affect: Mood normal.         Behavior: Behavior normal.           Silas Villafuerte DO    This note was generated using Dragon voice recognition software.   There may be medical errors due to computer generated translation

## 2023-03-03 ENCOUNTER — OFFICE VISIT (OUTPATIENT)
Dept: ONCOLOGY | Age: 78
End: 2023-03-03
Payer: MEDICARE

## 2023-03-03 ENCOUNTER — HOSPITAL ENCOUNTER (OUTPATIENT)
Dept: LAB | Age: 78
End: 2023-03-03
Payer: MEDICARE

## 2023-03-03 ENCOUNTER — HOSPITAL ENCOUNTER (OUTPATIENT)
Dept: INFUSION THERAPY | Age: 78
Discharge: HOME | End: 2023-03-03
Payer: MEDICARE

## 2023-03-03 VITALS
RESPIRATION RATE: 16 BRPM | WEIGHT: 177 LBS | OXYGEN SATURATION: 99 % | SYSTOLIC BLOOD PRESSURE: 111 MMHG | DIASTOLIC BLOOD PRESSURE: 69 MMHG | HEIGHT: 62 IN | HEART RATE: 71 BPM | BODY MASS INDEX: 32.57 KG/M2 | TEMPERATURE: 97.9 F

## 2023-03-03 DIAGNOSIS — D64.9 ANEMIA, UNSPECIFIED TYPE: ICD-10-CM

## 2023-03-03 DIAGNOSIS — D50.9 IRON DEFICIENCY ANEMIA, UNSPECIFIED IRON DEFICIENCY ANEMIA TYPE: ICD-10-CM

## 2023-03-03 DIAGNOSIS — N18.32 CHRONIC KIDNEY DISEASE, STAGE 3B (HCC): Primary | ICD-10-CM

## 2023-03-03 DIAGNOSIS — N18.30 STAGE 3 CHRONIC KIDNEY DISEASE, UNSPECIFIED WHETHER STAGE 3A OR 3B CKD (HCC): Primary | ICD-10-CM

## 2023-03-03 LAB
ALBUMIN SERPL-MCNC: 3.5 G/DL (ref 3.2–4.6)
ALBUMIN/GLOB SERPL: 0.9 (ref 0.4–1.6)
ALP SERPL-CCNC: 61 U/L (ref 50–136)
ALT SERPL-CCNC: 13 U/L (ref 12–65)
ANION GAP SERPL CALC-SCNC: 4 MMOL/L (ref 2–11)
AST SERPL-CCNC: 26 U/L (ref 15–37)
BASOPHILS # BLD: 0.1 K/UL (ref 0–0.2)
BASOPHILS NFR BLD: 2 % (ref 0–2)
BILIRUB SERPL-MCNC: 0.5 MG/DL (ref 0.2–1.1)
BUN SERPL-MCNC: 27 MG/DL (ref 8–23)
CALCIUM SERPL-MCNC: 9.3 MG/DL (ref 8.3–10.4)
CHLORIDE SERPL-SCNC: 113 MMOL/L (ref 101–110)
CO2 SERPL-SCNC: 24 MMOL/L (ref 21–32)
CREAT SERPL-MCNC: 1.8 MG/DL (ref 0.6–1)
DIFFERENTIAL METHOD BLD: ABNORMAL
EOSINOPHIL # BLD: 0.1 K/UL (ref 0–0.8)
EOSINOPHIL NFR BLD: 2 % (ref 0.5–7.8)
ERYTHROCYTE [DISTWIDTH] IN BLOOD BY AUTOMATED COUNT: 13.5 % (ref 11.9–14.6)
GLOBULIN SER CALC-MCNC: 4.1 G/DL (ref 2.8–4.5)
GLUCOSE SERPL-MCNC: 93 MG/DL (ref 65–100)
HCT VFR BLD AUTO: 27.4 % (ref 35.8–46.3)
HGB BLD-MCNC: 8.7 G/DL (ref 11.7–15.4)
IMM GRANULOCYTES # BLD AUTO: 0 K/UL (ref 0–0.5)
IMM GRANULOCYTES NFR BLD AUTO: 0 % (ref 0–5)
LYMPHOCYTES # BLD: 1.1 K/UL (ref 0.5–4.6)
LYMPHOCYTES NFR BLD: 36 % (ref 13–44)
MCH RBC QN AUTO: 33 PG (ref 26.1–32.9)
MCHC RBC AUTO-ENTMCNC: 31.8 G/DL (ref 31.4–35)
MCV RBC AUTO: 103.8 FL (ref 82–102)
MONOCYTES # BLD: 0.1 K/UL (ref 0.1–1.3)
MONOCYTES NFR BLD: 4 % (ref 4–12)
NEUTS SEG # BLD: 1.7 K/UL (ref 1.7–8.2)
NEUTS SEG NFR BLD: 55 % (ref 43–78)
NRBC # BLD: 0 K/UL (ref 0–0.2)
PLATELET # BLD AUTO: 144 K/UL (ref 150–450)
PMV BLD AUTO: 9.8 FL (ref 9.4–12.3)
POTASSIUM SERPL-SCNC: 3.8 MMOL/L (ref 3.5–5.1)
PROT SERPL-MCNC: 7.6 G/DL (ref 6.3–8.2)
RBC # BLD AUTO: 2.64 M/UL (ref 4.05–5.2)
SODIUM SERPL-SCNC: 141 MMOL/L (ref 133–143)
WBC # BLD AUTO: 3 K/UL (ref 4.3–11.1)

## 2023-03-03 PROCEDURE — G8417 CALC BMI ABV UP PARAM F/U: HCPCS | Performed by: INTERNAL MEDICINE

## 2023-03-03 PROCEDURE — 1036F TOBACCO NON-USER: CPT | Performed by: INTERNAL MEDICINE

## 2023-03-03 PROCEDURE — 36415 COLL VENOUS BLD VENIPUNCTURE: CPT

## 2023-03-03 PROCEDURE — 96372 THER/PROPH/DIAG INJ SC/IM: CPT

## 2023-03-03 PROCEDURE — 99214 OFFICE O/P EST MOD 30 MIN: CPT | Performed by: INTERNAL MEDICINE

## 2023-03-03 PROCEDURE — 6360000002 HC RX W HCPCS: Performed by: INTERNAL MEDICINE

## 2023-03-03 PROCEDURE — G8484 FLU IMMUNIZE NO ADMIN: HCPCS | Performed by: INTERNAL MEDICINE

## 2023-03-03 PROCEDURE — 80053 COMPREHEN METABOLIC PANEL: CPT

## 2023-03-03 PROCEDURE — 85025 COMPLETE CBC W/AUTO DIFF WBC: CPT

## 2023-03-03 PROCEDURE — 1123F ACP DISCUSS/DSCN MKR DOCD: CPT | Performed by: INTERNAL MEDICINE

## 2023-03-03 PROCEDURE — 3074F SYST BP LT 130 MM HG: CPT | Performed by: INTERNAL MEDICINE

## 2023-03-03 PROCEDURE — 3078F DIAST BP <80 MM HG: CPT | Performed by: INTERNAL MEDICINE

## 2023-03-03 PROCEDURE — G8427 DOCREV CUR MEDS BY ELIG CLIN: HCPCS | Performed by: INTERNAL MEDICINE

## 2023-03-03 PROCEDURE — 1090F PRES/ABSN URINE INCON ASSESS: CPT | Performed by: INTERNAL MEDICINE

## 2023-03-03 PROCEDURE — G8400 PT W/DXA NO RESULTS DOC: HCPCS | Performed by: INTERNAL MEDICINE

## 2023-03-03 RX ORDER — SODIUM CHLORIDE 9 MG/ML
INJECTION, SOLUTION INTRAVENOUS CONTINUOUS
OUTPATIENT
Start: 2023-03-10

## 2023-03-03 RX ORDER — ONDANSETRON 2 MG/ML
4 INJECTION INTRAMUSCULAR; INTRAVENOUS ONCE
Status: COMPLETED | OUTPATIENT
Start: 2023-03-03 | End: 2023-03-03

## 2023-03-03 RX ORDER — ONDANSETRON 2 MG/ML
4 INJECTION INTRAMUSCULAR; INTRAVENOUS ONCE
Status: CANCELLED
Start: 2023-03-03 | End: 2023-03-03

## 2023-03-03 RX ORDER — DIPHENHYDRAMINE HYDROCHLORIDE 50 MG/ML
50 INJECTION INTRAMUSCULAR; INTRAVENOUS
OUTPATIENT
Start: 2023-03-10

## 2023-03-03 RX ORDER — ONDANSETRON 2 MG/ML
8 INJECTION INTRAMUSCULAR; INTRAVENOUS
OUTPATIENT
Start: 2023-03-10

## 2023-03-03 RX ORDER — EPINEPHRINE 1 MG/ML
0.3 INJECTION, SOLUTION, CONCENTRATE INTRAVENOUS PRN
OUTPATIENT
Start: 2023-03-10

## 2023-03-03 RX ORDER — ONDANSETRON 2 MG/ML
4 INJECTION INTRAMUSCULAR; INTRAVENOUS ONCE
Start: 2023-03-10 | End: 2023-03-10

## 2023-03-03 RX ORDER — ACETAMINOPHEN 325 MG/1
650 TABLET ORAL
OUTPATIENT
Start: 2023-03-10

## 2023-03-03 RX ORDER — ALBUTEROL SULFATE 90 UG/1
4 AEROSOL, METERED RESPIRATORY (INHALATION) PRN
OUTPATIENT
Start: 2023-03-10

## 2023-03-03 RX ADMIN — EPOETIN ALFA-EPBX 10000 UNITS: 10000 INJECTION, SOLUTION INTRAVENOUS; SUBCUTANEOUS at 09:49

## 2023-03-03 RX ADMIN — ONDANSETRON 4 MG: 2 INJECTION INTRAMUSCULAR; INTRAVENOUS at 09:50

## 2023-03-03 NOTE — PATIENT INSTRUCTIONS
Patient Instructions from Today's Visit    Reason for Visit:  Follow up - Anemia     Diagnosis Information:  [email or web address]    Plan:  Proceed to infusion for Retacrit. Please call us if you have any questions or concerns before your next visit. Follow Up:   Follow up in 2 weeks with the NP, with labs prior  Infusion after office visit for Procrit     Recent Lab Results:  Hospital Outpatient Visit on 03/03/2023   Component Date Value Ref Range Status    WBC 03/03/2023 3.0 (A)  4.3 - 11.1 K/uL Final    RBC 03/03/2023 2.64 (A)  4.05 - 5.2 M/uL Final    Hemoglobin 03/03/2023 8.7 (A)  11.7 - 15.4 g/dL Final    Hematocrit 03/03/2023 27.4 (A)  35.8 - 46.3 % Final    MCV 03/03/2023 103.8 (A)  82.0 - 102.0 FL Final    MCH 03/03/2023 33.0 (A)  26.1 - 32.9 PG Final    MCHC 03/03/2023 31.8  31.4 - 35.0 g/dL Final    RDW 03/03/2023 13.5  11.9 - 14.6 % Final    Platelets 05/54/0537 144 (A)  150 - 450 K/uL Final    MPV 03/03/2023 9.8  9.4 - 12.3 FL Final    nRBC 03/03/2023 0.00  0.0 - 0.2 K/uL Final    **Note: Absolute NRBC parameter is now reported with Hemogram**    Differential Type 03/03/2023 AUTOMATED    Final    Seg Neutrophils 03/03/2023 55  43 - 78 % Final    Lymphocytes 03/03/2023 36  13 - 44 % Final    Monocytes 03/03/2023 4  4.0 - 12.0 % Final    Eosinophils % 03/03/2023 2  0.5 - 7.8 % Final    Basophils 03/03/2023 2  0.0 - 2.0 % Final    Immature Granulocytes 03/03/2023 0  0.0 - 5.0 % Final    Segs Absolute 03/03/2023 1.7  1.7 - 8.2 K/UL Final    Absolute Lymph # 03/03/2023 1.1  0.5 - 4.6 K/UL Final    Absolute Mono # 03/03/2023 0.1  0.1 - 1.3 K/UL Final    Absolute Eos # 03/03/2023 0.1  0.0 - 0.8 K/UL Final    Basophils Absolute 03/03/2023 0.1  0.0 - 0.2 K/UL Final    Absolute Immature Granulocyte 03/03/2023 0.0  0.0 - 0.5 K/UL Final         Treatment Summary has been discussed and given to patient: N/A        -------------------------------------------------------------------------------------------------------------------  Please call our office at [phone] if you have any  of the following symptoms:   Fever of 100.5 or greater  Chills  Shortness of breath  Swelling or pain in one leg    After office hours an answering service is available and will contact a provider for emergencies or if you are experiencing any of the above symptoms. Patient does express an interest in My Chart. My Chart log in information explained on the after visit summary printout at the [address] [address] [address] desk.     Madison Harrington RN

## 2023-03-03 NOTE — PROGRESS NOTES
[facility]: Office Visit Established Patient    Reason for follow up:    Macrocytic anemia    Overview: (copied from prior)    Ms. Negrito Tipton is a 65years old female patient with history of hypertension, hyperlipidemia, chronic kidney disease, colon polyps, total abdominal hysterectomy with bilateral salpingo-oophorectomy and spontaneous pneumothorax who has been referred for evaluation of microcytic anemia. On 8/26/202022, she presented to her PCP with complaints of fatigue and shortness of breath with activity. Hemoglobin was down to 8.3 from 11 (in March 2022). On evaluation today, patient reports moderate persistent fatigue and exertional dyspnea with onset a few months ago. She denies fevers, night sweats, chest pain, bloody/black stools, nausea, vomiting, change in appetite, heartburn, headache, gait disturbances, tingling/numbness or weakness in any part of her body. She denies any swollen glands easy bleeding or bruising. She has intentionally lost about 24 pounds over the course of last few months by following healthier eating habits. She is scheduled to undergo EGD and colonoscopy on 10/19/2022. 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 EGD showed gastritis determined by biopsy. Interval history:  Patient returns for follow-up and lab review. She has continued to feel well with improved energy and without weight loss. There are no reports of fever, night sweats, excessive fatigue, spontaneous bleeding/bruising, cough, chest pain or dizziness. She has been able to play golf as usual.  She developed nausea lasting several hours after her first Retacrit shot      Review of Systems:  14 point ROS was negative except as per HPI      ECOG PERFORMANCE STATUS - 0-Fully active, able to carry on all pre-disease performance without restriction. Pain - /10.  None/Minimal pain - not affecting QOL     Fatigue - No flowsheet data found.  Distress - No flowsheet data found. 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, diverticula colon, internal hemorrhoids       Reviewed and updated this visit by provider:  Tobacco  Allergies  Meds  Problems  Med Hx  Surg Hx  Fam Hx          Current Outpatient Medications   Medication Sig Dispense Refill    ezetimibe (ZETIA) 10 MG tablet TAKE ONE TABLET BY MOUTH ONE TIME DAILY for cholesterol 90 tablet 3    simvastatin (ZOCOR) 40 MG tablet TAKE ONE TABLET BY MOUTH EVERY NIGHT for cholesterol 90 tablet 3    folic acid (FOLVITE) 1 MG tablet Take 1 tablet by mouth daily 90 tablet 1    potassium chloride (KLOR-CON M) 20 MEQ extended release tablet Take 2 tablets by mouth 2 times daily 360 tablet 1    ferrous sulfate (IRON 325) 325 (65 Fe) MG tablet Take 325 mg by mouth daily      hydrALAZINE (APRESOLINE) 25 MG tablet Take 1 tablet by mouth 3 times daily 270 tablet 3    Multiple Vitamins-Minerals (PRESERVISION AREDS 2 PO) Take by mouth      aspirin 81 MG EC tablet Take by mouth daily      Biotin 2.5 MG TABS Take 5,000 mcg by mouth daily      Cholecalciferol 50 MCG (2000 UT) TABS Take by mouth      nitroGLYCERIN (NITROSTAT) 0.4 MG SL tablet Place 0.4 mg under the tongue      omeprazole (PRILOSEC) 20 MG delayed release capsule Take 20 mg by mouth daily as needed       No current facility-administered medications for this visit.         OBJECTIVE:  /69 (Site: Right Upper Arm, Position: Standing)   Pulse 71   Temp 97.9 °F (36.6 °C)   Resp 16   Ht 5' 2\" (1.575 m)   Wt 177 lb (80.3 kg)   SpO2 99%   BMI 32.37 kg/m²   Wt Readings from Last 3 Encounters:   03/03/23 177 lb (80.3 kg)   02/24/23 176 lb (79.8 kg)   01/27/23 178 lb 9.6 oz (81 kg)       Physical Exam:  Patient alert and oriented x 3, in no acute distress  Integumentary: No Pallor, no icterus  HEENT: Moist mucous membranes, normal oropharynx  Cardiovascular:RRR, S1, S2 present, + systolic murmur  Lungs: Clear to auscultation, no rales or wheezing, no accessory muscle use  Abdomen: Soft, and non-tender on palpation, no organomegaly, bowel sounds audible  Extremities: No peripheral edema, no joint swelling  Neurological: patient can follow commands and move all extremities    Labs:  Lab Results   Component Value Date    WBC 3.0 (L) 03/03/2023    HGB 8.7 (L) 03/03/2023    HCT 27.4 (L) 03/03/2023    .8 (H) 03/03/2023     (L) 03/03/2023     Lab Results   Component Value Date    NEUTROABS 2.0 02/18/2022    LYMPHOPCT 36 03/03/2023    LYMPHSABS 1.1 03/03/2023    MONOPCT 4 03/03/2023    MONOSABS 0.1 03/03/2023    EOSABS 0.1 03/03/2023    BASOPCT 2 03/03/2023     Lab Results   Component Value Date     03/03/2023    K 3.8 03/03/2023     (H) 03/03/2023    CO2 24 03/03/2023    BUN 27 (H) 03/03/2023    CREATININE 1.80 (H) 03/03/2023    GLUCOSE 93 03/03/2023    CALCIUM 9.3 03/03/2023    PROT 7.6 03/03/2023    LABALBU 3.5 03/03/2023    BILITOT 0.5 03/03/2023    ALKPHOS 61 03/03/2023    AST 26 03/03/2023    ALT 13 03/03/2023    LABGLOM 28 (L) 03/03/2023    GFRAA 47 (L) 09/23/2022    AGRATIO 1.3 02/18/2022    GLOB 4.1 03/03/2023     Lab Results   Component Value Date    IRON 110 01/27/2023    TIBC 243 (L) 01/27/2023    FERRITIN 458 (H) 01/27/2023         Polyclonal increase detected in 1 or more immunoglobulins on SPEP  Kappa lambda ratio normal-1.41  UPEP/HELLEN unremarkable  No evidence of hemolysis on labs. Methylmalonic acid level normal, homocystine elevated-pattern suggestive of folate deficiency     BONE MARROW SYNOPTIC REPORT     SPECIMEN:  Peripheral blood smear, bone marrow aspiration, bone marrow   aspirate clot, bone marrow core biopsy, bone marrow core touch   preparation. PROCEDURE:  Aspiration, biopsy.    ASPIRATION SITE:  Posterior iliac crest.   BIOPSY SITE:  Posterior iliac crest.   HISTOLOGIC TYPE:  No definitive evidence of a bone marrow disorder   IMMUNOPHENOTYPING:        Flow cytometric analysis shows no immunophenotypic abnormalities. Immunohistochemical stains show no increase in blasts. CYTOGENIC STUDIES:  Normal female karyotype. FLUORESCENCE IN SITU HYBRIDIZATION:  Normal results. MOLECULAR GENETIC STUDIES:          Myeloid Disorders Profile:  No pathogenic mutations are detected. COMMENT:  The bone marrow is normocellular for age with no definitive   evidence of a bone marrow disorder. In the absence of cytogenetics or   molecular abnormalities, the diagnosis of MDS is less likely. Imaging:  No results found. Problems:  Normocytic anemia, bm bx : No definitive morphologic evidence of a bone marrow disorder   Inappropriately low Ferritin in the setting of stage III CKD  2. Stage 3 chronic kidney disease, unspecified whether stage 3a or 3b CKD ([facility] [address]    3. Labs are suggestive of iron deficiency despite taking daily oral iron. 4. Other general symptoms and signs     5. Macrocytosis    History of colonic polyps. Folate deficiency  Hypertension  Hyperlipidemia  GERD     Leukocytes and Plt preserved. PLAN:  Bone marrow biopsy: No definitive morphologic evidence of a bone marrow disorder    c/w folic acid. Proceed with IV iron as planned. Recent EGD and colonoscopy did not reveal any active source of bleeding. Capsule endoscopy may be considered for further work-up.  -She will continue taking potassium chloride, hydralazine, simvastatin, aspirin, vitamin D and Prilosec. 1/27/2023: Patient is clinically stable. Most recent iron studies are suggestive of anemia of chronic disease likely related to her CKD. Further IV iron supplementation is not felt indicated at this time. After a thorough discussion, we decided to proceed with biweekly Retacrit injections at 10,000 units subq per dose. Target hemoglobin 10-11. I shall see her back in one month to assess efficacy of the above intervention.   She will continue taking potassium chloride, hydralazine, simvastatin, aspirin, vitamin D and Prilosec for other medical conditions listed above. 3/3/2023: Patient is clinically stable on evaluation today. Received Retacrit on 3/3/2023 and developed nausea that self resolved after several hours. Labs today indicate decline in hemoglobin to 8.7. Macrocytosis is noted. White blood cell and platelet counts have also declined. Recent bone marrow biopsy was negative for evidence of bone marrow disorder. Continue with folic acid 1 mg p.o. daily. She will receive Retacrit with IV Zofran today. If her nausea and cytopenias persist, plan to switch to another formulation of erythropoietin analog. RoV with labs in about 2 weeks. Belen Baker MD  [facility]  [address] [place] [address]  Office : [phone]  Fax : [phone]    Elements of this note have been dictated using speech recognition software. As a result, errors of speech recognition may have occurred.

## 2023-03-03 NOTE — PROGRESS NOTES
Arrived to the Infusion Center.  Retacrit injection completed.   Provided education on same    Patient instructed to report any side affects to ordering provider.  Patient tolerated well.   Any issues or concerns during appointment: none.  Patient aware of next infusion appointment on 03/17/2023 (date) at 1030 (time).  Discharged ambulatory.

## 2023-03-09 DIAGNOSIS — N18.30 STAGE 3 CHRONIC KIDNEY DISEASE, UNSPECIFIED WHETHER STAGE 3A OR 3B CKD (HCC): Primary | ICD-10-CM

## 2023-03-09 DIAGNOSIS — D50.9 IRON DEFICIENCY ANEMIA, UNSPECIFIED IRON DEFICIENCY ANEMIA TYPE: ICD-10-CM

## 2023-03-13 ENCOUNTER — HOSPITAL ENCOUNTER (OUTPATIENT)
Dept: MAMMOGRAPHY | Age: 78
Discharge: HOME | End: 2023-03-16
Payer: MEDICARE

## 2023-03-13 DIAGNOSIS — Z12.31 VISIT FOR SCREENING MAMMOGRAM: ICD-10-CM

## 2023-03-13 PROCEDURE — 77067 SCR MAMMO BI INCL CAD: CPT

## 2023-03-14 NOTE — PROGRESS NOTES
[facility] Hematology and Oncology: Office Visit Established Patient    Reason for follow up:    Macrocytic anemia    Overview: (copied from prior)    Ms. Luis M Kendall is a 65years old female patient with history of hypertension, hyperlipidemia, chronic kidney disease, colon polyps, total abdominal hysterectomy with bilateral salpingo-oophorectomy and spontaneous pneumothorax who has been referred for evaluation of microcytic anemia. On 8/26/202022, she presented to her PCP with complaints of fatigue and shortness of breath with activity. Hemoglobin was down to 8.3 from 11 (in March 2022). On evaluation today, patient reports moderate persistent fatigue and exertional dyspnea with onset a few months ago. She denies fevers, night sweats, chest pain, bloody/black stools, nausea, vomiting, change in appetite, heartburn, headache, gait disturbances, tingling/numbness or weakness in any part of her body. She denies any swollen glands easy bleeding or bruising. She has intentionally lost about 24 pounds over the course of last few months by following healthier eating habits. She is scheduled to undergo EGD and colonoscopy on 10/19/2022. 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 EGD showed gastritis determined by biopsy. Interval history:  Patient returns for follow-up. Tolerated Retacrit with a dose of Zofran without any problems. She denies unexplained fevers, night sweats, weight loss, chest pain, spontaneous bleeding or bruising, cough. Her energy level has been slowly improving. Review of Systems:  14 point ROS was negative except as per HPI      ECOG PERFORMANCE STATUS - 0-Fully active, able to carry on all pre-disease performance without restriction. Pain - /10. None/Minimal pain - not affecting QOL     Fatigue - No flowsheet data found. Distress - No flowsheet data found.   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, diverticula colon, internal hemorrhoids       Reviewed and updated this visit by provider:  Tobacco  Allergies  Meds  Problems  Med Hx  Surg Hx  Fam Hx          Current Outpatient Medications   Medication Sig Dispense Refill    ezetimibe (ZETIA) 10 MG tablet TAKE ONE TABLET BY MOUTH ONE TIME DAILY for cholesterol 90 tablet 3    folic acid (FOLVITE) 1 MG tablet Take 1 tablet by mouth daily 90 tablet 1    ferrous sulfate (IRON 325) 325 (65 Fe) MG tablet Take 325 mg by mouth daily      aspirin 81 MG EC tablet Take by mouth daily      Biotin 2.5 MG TABS Take 5,000 mcg by mouth daily      Cholecalciferol 50 MCG (2000 UT) TABS Take by mouth      simvastatin (ZOCOR) 40 MG tablet TAKE ONE TABLET BY MOUTH EVERY NIGHT for cholesterol 90 tablet 3    potassium chloride (KLOR-CON M) 20 MEQ extended release tablet Take 2 tablets by mouth 2 times daily 360 tablet 1    hydrALAZINE (APRESOLINE) 25 MG tablet Take 1 tablet by mouth 3 times daily 270 tablet 3    Multiple Vitamins-Minerals (PRESERVISION AREDS 2 PO) Take by mouth      nitroGLYCERIN (NITROSTAT) 0.4 MG SL tablet Place 0.4 mg under the tongue      omeprazole (PRILOSEC) 20 MG delayed release capsule Take 20 mg by mouth daily as needed       No current facility-administered medications for this visit.         OBJECTIVE:  /66   Pulse 65   Temp 97.9 °F (36.6 °C) (Oral)   Resp 14   Ht 5' 2\" (1.575 m)   Wt 176 lb 6.4 oz (80 kg)   SpO2 98%   BMI 32.26 kg/m²   Wt Readings from Last 3 Encounters:   03/15/23 176 lb 6.4 oz (80 kg)   03/03/23 177 lb (80.3 kg)   02/24/23 176 lb (79.8 kg)       Physical Exam:  Patient alert and oriented x 3, in no acute distress  Integumentary: No Pallor, no icterus  HEENT: Moist mucous membranes, normal oropharynx  Cardiovascular:RRR, S1, S2 present, + systolic murmur  Lungs: Clear to auscultation, no rales or wheezing, no accessory muscle use  Abdomen: Soft, and non-tender on palpation, no organomegaly, bowel sounds audible  Extremities: No peripheral edema, no joint swelling  Neurological: patient can follow commands and move all extremities    Labs:  Lab Results   Component Value Date    WBC 4.3 03/15/2023    HGB 9.1 (L) 03/15/2023    HCT 27.8 (L) 03/15/2023    .3 (H) 03/15/2023     03/15/2023     Lab Results   Component Value Date    NEUTROABS 2.0 02/18/2022    LYMPHOPCT 37 03/15/2023    LYMPHSABS 1.6 03/15/2023    MONOPCT 6 03/15/2023    MONOSABS 0.3 03/15/2023    EOSABS 0.1 03/15/2023    BASOPCT 1 03/15/2023     Lab Results   Component Value Date     03/15/2023    K 4.1 03/15/2023     (H) 03/15/2023    CO2 25 03/15/2023    BUN 30 (H) 03/15/2023    CREATININE 1.80 (H) 03/15/2023    GLUCOSE 84 03/15/2023    CALCIUM 9.4 03/15/2023    PROT 7.8 03/15/2023    LABALBU 3.6 03/15/2023    BILITOT 0.4 03/15/2023    ALKPHOS 62 03/15/2023    AST 20 03/15/2023    ALT 15 03/15/2023    LABGLOM 28 (L) 03/15/2023    GFRAA 47 (L) 09/23/2022    AGRATIO 1.3 02/18/2022    GLOB 4.2 03/15/2023     Lab Results   Component Value Date    IRON 110 01/27/2023    TIBC 243 (L) 01/27/2023    FERRITIN 458 (H) 01/27/2023         Polyclonal increase detected in 1 or more immunoglobulins on SPEP  Kappa lambda ratio normal-1.41  UPEP/HELLEN unremarkable  No evidence of hemolysis on labs. Methylmalonic acid level normal, homocystine elevated-pattern suggestive of folate deficiency     BONE MARROW SYNOPTIC REPORT     SPECIMEN:  Peripheral blood smear, bone marrow aspiration, bone marrow   aspirate clot, bone marrow core biopsy, bone marrow core touch   preparation. PROCEDURE:  Aspiration, biopsy. ASPIRATION SITE:  Posterior iliac crest.   BIOPSY SITE:  Posterior iliac crest.   HISTOLOGIC TYPE:  No definitive evidence of a bone marrow disorder   IMMUNOPHENOTYPING:        Flow cytometric analysis shows no immunophenotypic abnormalities. Immunohistochemical stains show no increase in blasts.      CYTOGENIC STUDIES:  Normal female karyotype. FLUORESCENCE IN SITU HYBRIDIZATION:  Normal results. MOLECULAR GENETIC STUDIES:          Myeloid Disorders Profile:  No pathogenic mutations are detected. COMMENT:  The bone marrow is normocellular for age with no definitive   evidence of a bone marrow disorder. In the absence of cytogenetics or   molecular abnormalities, the diagnosis of MDS is less likely. Imaging:  No results found. Problems:  Normocytic anemia, bm bx : No definitive morphologic evidence of a bone marrow disorder   Inappropriately low Ferritin in the setting of stage III CKD  2. Stage 3 chronic kidney disease, unspecified whether stage 3a or 3b CKD ([address]    3. Labs are suggestive of iron deficiency despite taking daily oral iron. 4. Other general symptoms and signs     5. Macrocytosis    History of colonic polyps. Folate deficiency  Hypertension  Hyperlipidemia  GERD     Leukocytes and Plt preserved. PLAN:  Bone marrow biopsy: No definitive morphologic evidence of a bone marrow disorder    c/w folic acid. Proceed with IV iron as planned. Recent EGD and colonoscopy did not reveal any active source of bleeding. Capsule endoscopy may be considered for further work-up.  -She will continue taking potassium chloride, hydralazine, simvastatin, aspirin, vitamin D and Prilosec. 1/27/2023: Patient is clinically stable. Most recent iron studies are suggestive of anemia of chronic disease likely related to her CKD. Further IV iron supplementation is not felt indicated at this time. After a thorough discussion, we decided to proceed with biweekly Retacrit injections at 10,000 units subq per dose. Target hemoglobin 10-11. I shall see her back in one month to assess efficacy of the above intervention. She will continue taking potassium chloride, hydralazine, simvastatin, aspirin, vitamin D and Prilosec for other medical conditions listed above.     3/3/2023: Patient is clinically stable on evaluation today. Received Retacrit on 3/3/2023 and developed nausea that self resolved after several hours. Labs today indicate decline in hemoglobin to 8.7. Macrocytosis is noted. White blood cell and platelet counts have also declined. Recent bone marrow biopsy was negative for evidence of bone marrow disorder. Continue with folic acid 1 mg p.o. daily. She will receive Retacrit with IV Zofran today. If her nausea and cytopenias persist, plan to switch to another formulation of erythropoietin analog. RoV with labs in about 2 weeks. 3/15/23: Pt is clinically stable with improvement in fatigue and hgb. Increase Retacrit dose to 20,000 units every other week. IV Zofran with tx. Goal hgb 10-11. ROV with labs as scheduled. C/w folic acid 1 mg PO daily. Jose Leyva MD  [facility] Hematology and Oncology  [address] [place] [address]  Office : [phone]  Fax : [phone]    Elements of this note have been dictated using speech recognition software. As a result, errors of speech recognition may have occurred.

## 2023-03-15 ENCOUNTER — HOSPITAL ENCOUNTER (OUTPATIENT)
Dept: LAB | Age: 78
Discharge: HOME | End: 2023-03-18
Payer: MEDICARE

## 2023-03-15 ENCOUNTER — OFFICE VISIT (OUTPATIENT)
Dept: ONCOLOGY | Age: 78
End: 2023-03-15
Payer: MEDICARE

## 2023-03-15 VITALS
RESPIRATION RATE: 14 BRPM | HEIGHT: 62 IN | HEART RATE: 65 BPM | BODY MASS INDEX: 32.46 KG/M2 | OXYGEN SATURATION: 98 % | TEMPERATURE: 97.9 F | DIASTOLIC BLOOD PRESSURE: 66 MMHG | WEIGHT: 176.4 LBS | SYSTOLIC BLOOD PRESSURE: 135 MMHG

## 2023-03-15 DIAGNOSIS — N18.30 STAGE 3 CHRONIC KIDNEY DISEASE, UNSPECIFIED WHETHER STAGE 3A OR 3B CKD (HCC): Primary | ICD-10-CM

## 2023-03-15 DIAGNOSIS — D64.9 ANEMIA, UNSPECIFIED TYPE: ICD-10-CM

## 2023-03-15 DIAGNOSIS — D50.9 IRON DEFICIENCY ANEMIA, UNSPECIFIED IRON DEFICIENCY ANEMIA TYPE: ICD-10-CM

## 2023-03-15 DIAGNOSIS — N18.30 STAGE 3 CHRONIC KIDNEY DISEASE, UNSPECIFIED WHETHER STAGE 3A OR 3B CKD (HCC): ICD-10-CM

## 2023-03-15 LAB
ALBUMIN SERPL-MCNC: 3.6 G/DL (ref 3.2–4.6)
ALBUMIN/GLOB SERPL: 0.9 (ref 0.4–1.6)
ALP SERPL-CCNC: 62 U/L (ref 50–136)
ALT SERPL-CCNC: 15 U/L (ref 12–65)
ANION GAP SERPL CALC-SCNC: 3 MMOL/L (ref 2–11)
AST SERPL-CCNC: 20 U/L (ref 15–37)
BASOPHILS # BLD: 0.1 K/UL (ref 0–0.2)
BASOPHILS NFR BLD: 1 % (ref 0–2)
BILIRUB SERPL-MCNC: 0.4 MG/DL (ref 0.2–1.1)
BUN SERPL-MCNC: 30 MG/DL (ref 8–23)
CALCIUM SERPL-MCNC: 9.4 MG/DL (ref 8.3–10.4)
CHLORIDE SERPL-SCNC: 112 MMOL/L (ref 101–110)
CO2 SERPL-SCNC: 25 MMOL/L (ref 21–32)
CREAT SERPL-MCNC: 1.8 MG/DL (ref 0.6–1)
DIFFERENTIAL METHOD BLD: ABNORMAL
EOSINOPHIL # BLD: 0.1 K/UL (ref 0–0.8)
EOSINOPHIL NFR BLD: 3 % (ref 0.5–7.8)
ERYTHROCYTE [DISTWIDTH] IN BLOOD BY AUTOMATED COUNT: 13.6 % (ref 11.9–14.6)
GLOBULIN SER CALC-MCNC: 4.2 G/DL (ref 2.8–4.5)
GLUCOSE SERPL-MCNC: 84 MG/DL (ref 65–100)
HCT VFR BLD AUTO: 27.8 % (ref 35.8–46.3)
HGB BLD-MCNC: 9.1 G/DL (ref 11.7–15.4)
IMM GRANULOCYTES # BLD AUTO: 0 K/UL (ref 0–0.5)
IMM GRANULOCYTES NFR BLD AUTO: 1 % (ref 0–5)
LYMPHOCYTES # BLD: 1.6 K/UL (ref 0.5–4.6)
LYMPHOCYTES NFR BLD: 37 % (ref 13–44)
MCH RBC QN AUTO: 33.8 PG (ref 26.1–32.9)
MCHC RBC AUTO-ENTMCNC: 32.7 G/DL (ref 31.4–35)
MCV RBC AUTO: 103.3 FL (ref 82–102)
MONOCYTES # BLD: 0.3 K/UL (ref 0.1–1.3)
MONOCYTES NFR BLD: 6 % (ref 4–12)
NEUTS SEG # BLD: 2.3 K/UL (ref 1.7–8.2)
NEUTS SEG NFR BLD: 52 % (ref 43–78)
NRBC # BLD: 0 K/UL (ref 0–0.2)
PLATELET # BLD AUTO: 172 K/UL (ref 150–450)
PMV BLD AUTO: 9.4 FL (ref 9.4–12.3)
POTASSIUM SERPL-SCNC: 4.1 MMOL/L (ref 3.5–5.1)
PROT SERPL-MCNC: 7.8 G/DL (ref 6.3–8.2)
RBC # BLD AUTO: 2.69 M/UL (ref 4.05–5.2)
SODIUM SERPL-SCNC: 140 MMOL/L (ref 133–143)
WBC # BLD AUTO: 4.3 K/UL (ref 4.3–11.1)

## 2023-03-15 PROCEDURE — G8400 PT W/DXA NO RESULTS DOC: HCPCS | Performed by: INTERNAL MEDICINE

## 2023-03-15 PROCEDURE — 3075F SYST BP GE 130 - 139MM HG: CPT | Performed by: INTERNAL MEDICINE

## 2023-03-15 PROCEDURE — G8484 FLU IMMUNIZE NO ADMIN: HCPCS | Performed by: INTERNAL MEDICINE

## 2023-03-15 PROCEDURE — 1123F ACP DISCUSS/DSCN MKR DOCD: CPT | Performed by: INTERNAL MEDICINE

## 2023-03-15 PROCEDURE — 80053 COMPREHEN METABOLIC PANEL: CPT

## 2023-03-15 PROCEDURE — 99214 OFFICE O/P EST MOD 30 MIN: CPT | Performed by: INTERNAL MEDICINE

## 2023-03-15 PROCEDURE — 36415 COLL VENOUS BLD VENIPUNCTURE: CPT

## 2023-03-15 PROCEDURE — G8417 CALC BMI ABV UP PARAM F/U: HCPCS | Performed by: INTERNAL MEDICINE

## 2023-03-15 PROCEDURE — 85025 COMPLETE CBC W/AUTO DIFF WBC: CPT

## 2023-03-15 PROCEDURE — G8427 DOCREV CUR MEDS BY ELIG CLIN: HCPCS | Performed by: INTERNAL MEDICINE

## 2023-03-15 PROCEDURE — 1036F TOBACCO NON-USER: CPT | Performed by: INTERNAL MEDICINE

## 2023-03-15 PROCEDURE — 82525 ASSAY OF COPPER: CPT

## 2023-03-15 PROCEDURE — 1090F PRES/ABSN URINE INCON ASSESS: CPT | Performed by: INTERNAL MEDICINE

## 2023-03-15 PROCEDURE — 3078F DIAST BP <80 MM HG: CPT | Performed by: INTERNAL MEDICINE

## 2023-03-15 ASSESSMENT — PATIENT HEALTH QUESTIONNAIRE - PHQ9
SUM OF ALL RESPONSES TO PHQ QUESTIONS 1-9: 0
2. FEELING DOWN, DEPRESSED OR HOPELESS: 0
SUM OF ALL RESPONSES TO PHQ QUESTIONS 1-9: 0
1. LITTLE INTEREST OR PLEASURE IN DOING THINGS: 0
SUM OF ALL RESPONSES TO PHQ QUESTIONS 1-9: 0
SUM OF ALL RESPONSES TO PHQ9 QUESTIONS 1 & 2: 0
SUM OF ALL RESPONSES TO PHQ QUESTIONS 1-9: 0

## 2023-03-15 NOTE — PATIENT INSTRUCTIONS
Patient Instructions from Today's Visit    Reason for Visit:  Follow up - Anemia     Diagnosis Information:  [email or web address] net/about-us/[email or web address]-answers-patient-education-materials/ccsg-holstmv-dknw-sheets    Plan:  Proceed to infusion Friday for Procrit. Will increase dose to 20,000 units. Please call us if you have any questions or concerns before your next visit. Follow Up: Follow up as scheduled.      Recent Lab Results:  Hospital Outpatient Visit on 03/15/2023   Component Date Value Ref Range Status    WBC 03/15/2023 4.3  4.3 - 11.1 K/uL Final    RBC 03/15/2023 2.69 (A)  4.05 - 5.2 M/uL Final    Hemoglobin 03/15/2023 9.1 (A)  11.7 - 15.4 g/dL Final    Hematocrit 03/15/2023 27.8 (A)  35.8 - 46.3 % Final    MCV 03/15/2023 103.3 (A)  82.0 - 102.0 FL Final    MCH 03/15/2023 33.8 (A)  26.1 - 32.9 PG Final    MCHC 03/15/2023 32.7  31.4 - 35.0 g/dL Final    RDW 03/15/2023 13.6  11.9 - 14.6 % Final    Platelets 01/34/3170 172  150 - 450 K/uL Final    MPV 03/15/2023 9.4  9.4 - 12.3 FL Final    nRBC 03/15/2023 0.00  0.0 - 0.2 K/uL Final    **Note: Absolute NRBC parameter is now reported with Hemogram**    Seg Neutrophils 03/15/2023 52  43 - 78 % Final    Lymphocytes 03/15/2023 37  13 - 44 % Final    Monocytes 03/15/2023 6  4.0 - 12.0 % Final    Eosinophils % 03/15/2023 3  0.5 - 7.8 % Final    Basophils 03/15/2023 1  0.0 - 2.0 % Final    Immature Granulocytes 03/15/2023 1  0.0 - 5.0 % Final    Segs Absolute 03/15/2023 2.3  1.7 - 8.2 K/UL Final    Absolute Lymph # 03/15/2023 1.6  0.5 - 4.6 K/UL Final    Absolute Mono # 03/15/2023 0.3  0.1 - 1.3 K/UL Final    Absolute Eos # 03/15/2023 0.1  0.0 - 0.8 K/UL Final    Basophils Absolute 03/15/2023 0.1  0.0 - 0.2 K/UL Final    Absolute Immature Granulocyte 03/15/2023 0.0  0.0 - 0.5 K/UL Final    Differential Type 03/15/2023 AUTOMATED    Final    Sodium 03/15/2023 140  133 - 143 mmol/L Final    Potassium 03/15/2023 4.1  3.5 - 5.1 mmol/L Final    Chloride 03/15/2023 112 (A)  101 - 110 mmol/L Final    CO2 03/15/2023 25  21 - 32 mmol/L Final    Anion Gap 03/15/2023 3  2 - 11 mmol/L Final    Glucose 03/15/2023 84  65 - 100 mg/dL Final    BUN 03/15/2023 30 (A)  8 - 23 MG/DL Final    Creatinine 03/15/2023 1.80 (A)  0.6 - 1.0 MG/DL Final    Est, Glom Filt Rate 03/15/2023 28 (A)  >60 ml/min/1.73m2 Final    Comment:      Pediatric calculator link: [employer]. org/professionals/kdoqi/gfr_calculatorped       These results are not intended for use in patients <25years of age. eGFR results are calculated without a race factor using  the 2021 CKD-EPI equation. Careful clinical correlation is recommended, particularly when comparing to results calculated using previous equations. The CKD-EPI equation is less accurate in patients with extremes of muscle mass, extra-renal metabolism of creatinine, excessive creatine ingestion, or following therapy that affects renal tubular secretion. Calcium 03/15/2023 9.4  8.3 - 10.4 MG/DL Final    Total Bilirubin 03/15/2023 0.4  0.2 - 1.1 MG/DL Final    ALT 03/15/2023 15  12 - 65 U/L Final    AST 03/15/2023 20  15 - 37 U/L Final    Alk Phosphatase 03/15/2023 62  50 - 136 U/L Final    Total Protein 03/15/2023 7.8  6.3 - 8.2 g/dL Final    Albumin 03/15/2023 3.6  3.2 - 4.6 g/dL Final    Globulin 03/15/2023 4.2  2.8 - 4.5 g/dL Final    Albumin/Globulin Ratio 03/15/2023 0.9  0.4 - 1.6   Final         Treatment Summary has been discussed and given to patient: N/A        -------------------------------------------------------------------------------------------------------------------  Please call our office at [phone] if you have any  of the following symptoms:   Fever of 100.5 or greater  Chills  Shortness of breath  Swelling or pain in one leg    After office hours an answering service is available and will contact a provider for emergencies or if you are experiencing any of the above symptoms.     Patient does express an interest in My Chart.  My Chart log in information explained on the after visit summary printout at the [facility] [address] desk.     Jojo Oliva RN

## 2023-03-16 LAB — COPPER SERPL-MCNC: 137 UG/DL (ref 80–158)

## 2023-03-17 ENCOUNTER — HOSPITAL ENCOUNTER (OUTPATIENT)
Dept: INFUSION THERAPY | Age: 78
Discharge: HOME | End: 2023-03-17
Payer: MEDICARE

## 2023-03-17 VITALS
RESPIRATION RATE: 16 BRPM | HEART RATE: 72 BPM | TEMPERATURE: 97.8 F | OXYGEN SATURATION: 96 % | SYSTOLIC BLOOD PRESSURE: 112 MMHG | DIASTOLIC BLOOD PRESSURE: 78 MMHG

## 2023-03-17 DIAGNOSIS — D64.9 ANEMIA, UNSPECIFIED TYPE: ICD-10-CM

## 2023-03-17 DIAGNOSIS — N18.32 CHRONIC KIDNEY DISEASE, STAGE 3B (HCC): Primary | ICD-10-CM

## 2023-03-17 PROCEDURE — 6360000002 HC RX W HCPCS: Performed by: INTERNAL MEDICINE

## 2023-03-17 PROCEDURE — 96372 THER/PROPH/DIAG INJ SC/IM: CPT

## 2023-03-17 RX ORDER — ONDANSETRON 2 MG/ML
4 INJECTION INTRAMUSCULAR; INTRAVENOUS ONCE
Status: COMPLETED | OUTPATIENT
Start: 2023-03-17 | End: 2023-03-17

## 2023-03-17 RX ORDER — DIPHENHYDRAMINE HYDROCHLORIDE 50 MG/ML
50 INJECTION INTRAMUSCULAR; INTRAVENOUS
OUTPATIENT
Start: 2023-03-24

## 2023-03-17 RX ORDER — ONDANSETRON 2 MG/ML
4 INJECTION INTRAMUSCULAR; INTRAVENOUS ONCE
Start: 2023-03-24 | End: 2023-03-24

## 2023-03-17 RX ORDER — ONDANSETRON 2 MG/ML
8 INJECTION INTRAMUSCULAR; INTRAVENOUS
OUTPATIENT
Start: 2023-03-24

## 2023-03-17 RX ORDER — ACETAMINOPHEN 325 MG/1
650 TABLET ORAL
OUTPATIENT
Start: 2023-03-24

## 2023-03-17 RX ORDER — EPINEPHRINE 1 MG/ML
0.3 INJECTION, SOLUTION, CONCENTRATE INTRAVENOUS PRN
OUTPATIENT
Start: 2023-03-24

## 2023-03-17 RX ORDER — ALBUTEROL SULFATE 90 UG/1
4 AEROSOL, METERED RESPIRATORY (INHALATION) PRN
OUTPATIENT
Start: 2023-03-24

## 2023-03-17 RX ORDER — SODIUM CHLORIDE 9 MG/ML
INJECTION, SOLUTION INTRAVENOUS CONTINUOUS
OUTPATIENT
Start: 2023-03-24

## 2023-03-17 RX ADMIN — ONDANSETRON 4 MG: 2 INJECTION INTRAMUSCULAR; INTRAVENOUS at 10:08

## 2023-03-17 RX ADMIN — EPOETIN ALFA-EPBX 20000 UNITS: 20000 INJECTION, SOLUTION INTRAVENOUS; SUBCUTANEOUS at 10:06

## 2023-03-17 NOTE — PROGRESS NOTES
Arrived to the [facility]. Reticrit + IM Zofran administered. Patient tolerated well. Any issues or concerns during appointment: none. Patient instructed to call provider with temperature of 100.4 or greater or nausea/vomiting/ diarrhea or pain not controlled by medications. Discharged ambulatory.

## 2023-03-31 ENCOUNTER — HOSPITAL ENCOUNTER (OUTPATIENT)
Dept: INFUSION THERAPY | Age: 78
Discharge: HOME | End: 2023-03-31
Payer: MEDICARE

## 2023-03-31 ENCOUNTER — HOSPITAL ENCOUNTER (OUTPATIENT)
Dept: LAB | Age: 78
Discharge: HOME | End: 2023-04-03

## 2023-03-31 VITALS
SYSTOLIC BLOOD PRESSURE: 131 MMHG | TEMPERATURE: 97.5 F | DIASTOLIC BLOOD PRESSURE: 59 MMHG | RESPIRATION RATE: 16 BRPM | OXYGEN SATURATION: 99 % | HEART RATE: 64 BPM

## 2023-03-31 DIAGNOSIS — D64.9 ANEMIA, UNSPECIFIED TYPE: Primary | ICD-10-CM

## 2023-03-31 DIAGNOSIS — N18.32 CHRONIC KIDNEY DISEASE, STAGE 3B (HCC): ICD-10-CM

## 2023-03-31 LAB
ERYTHROCYTE [DISTWIDTH] IN BLOOD BY AUTOMATED COUNT: 13.8 % (ref 11.9–14.6)
HCT VFR BLD AUTO: 28.7 % (ref 35.8–46.3)
HGB BLD-MCNC: 9 G/DL (ref 11.7–15.4)
MCH RBC QN AUTO: 32.6 PG (ref 26.1–32.9)
MCHC RBC AUTO-ENTMCNC: 31.4 G/DL (ref 31.4–35)
MCV RBC AUTO: 104 FL (ref 82–102)
NRBC # BLD: 0 K/UL (ref 0–0.2)
PLATELET # BLD AUTO: 170 K/UL (ref 150–450)
PMV BLD AUTO: 10.1 FL (ref 9.4–12.3)
RBC # BLD AUTO: 2.76 M/UL (ref 4.05–5.2)
WBC # BLD AUTO: 3.6 K/UL (ref 4.3–11.1)

## 2023-03-31 PROCEDURE — 6360000002 HC RX W HCPCS: Performed by: INTERNAL MEDICINE

## 2023-03-31 PROCEDURE — 96372 THER/PROPH/DIAG INJ SC/IM: CPT

## 2023-03-31 PROCEDURE — 36415 COLL VENOUS BLD VENIPUNCTURE: CPT

## 2023-03-31 PROCEDURE — 85027 COMPLETE CBC AUTOMATED: CPT

## 2023-03-31 RX ORDER — ACETAMINOPHEN 325 MG/1
650 TABLET ORAL
OUTPATIENT
Start: 2023-04-07

## 2023-03-31 RX ORDER — EPINEPHRINE 1 MG/ML
0.3 INJECTION, SOLUTION, CONCENTRATE INTRAVENOUS PRN
OUTPATIENT
Start: 2023-04-07

## 2023-03-31 RX ORDER — ONDANSETRON 2 MG/ML
4 INJECTION INTRAMUSCULAR; INTRAVENOUS ONCE
Start: 2023-04-07 | End: 2023-04-07

## 2023-03-31 RX ORDER — DIPHENHYDRAMINE HYDROCHLORIDE 50 MG/ML
50 INJECTION INTRAMUSCULAR; INTRAVENOUS
OUTPATIENT
Start: 2023-04-07

## 2023-03-31 RX ORDER — ONDANSETRON 2 MG/ML
4 INJECTION INTRAMUSCULAR; INTRAVENOUS ONCE
Status: COMPLETED | OUTPATIENT
Start: 2023-03-31 | End: 2023-03-31

## 2023-03-31 RX ORDER — ALBUTEROL SULFATE 90 UG/1
4 AEROSOL, METERED RESPIRATORY (INHALATION) PRN
OUTPATIENT
Start: 2023-04-07

## 2023-03-31 RX ORDER — ONDANSETRON 2 MG/ML
8 INJECTION INTRAMUSCULAR; INTRAVENOUS
OUTPATIENT
Start: 2023-04-07

## 2023-03-31 RX ORDER — SODIUM CHLORIDE 9 MG/ML
INJECTION, SOLUTION INTRAVENOUS CONTINUOUS
OUTPATIENT
Start: 2023-04-07

## 2023-03-31 RX ADMIN — EPOETIN ALFA-EPBX 20000 UNITS: 20000 INJECTION, SOLUTION INTRAVENOUS; SUBCUTANEOUS at 10:30

## 2023-03-31 RX ADMIN — ONDANSETRON 4 MG: 2 INJECTION INTRAMUSCULAR; INTRAVENOUS at 10:29

## 2023-03-31 NOTE — PROGRESS NOTES
Arrived to the [facility]. Retacrit completed. Patient instructed to report any side affects to ordering provider. Patient tolerated well. Any issues or concerns during appointment: none. Patient aware of next infusion appointment on 4/14/23 (date) at 56 (time). Discharged home ambulatory.

## 2023-04-14 ENCOUNTER — HOSPITAL ENCOUNTER (OUTPATIENT)
Dept: LAB | Age: 78
Discharge: HOME | End: 2023-04-17
Payer: MEDICARE

## 2023-04-14 ENCOUNTER — HOSPITAL ENCOUNTER (OUTPATIENT)
Dept: INFUSION THERAPY | Age: 78
Discharge: HOME | End: 2023-04-14

## 2023-04-14 DIAGNOSIS — N18.32 CHRONIC KIDNEY DISEASE, STAGE 3B (HCC): ICD-10-CM

## 2023-04-14 DIAGNOSIS — D64.9 ANEMIA, UNSPECIFIED TYPE: ICD-10-CM

## 2023-04-14 LAB
ERYTHROCYTE [DISTWIDTH] IN BLOOD BY AUTOMATED COUNT: 13.5 % (ref 11.9–14.6)
HCT VFR BLD AUTO: 32.7 % (ref 35.8–46.3)
HGB BLD-MCNC: 10.4 G/DL (ref 11.7–15.4)
MCH RBC QN AUTO: 33 PG (ref 26.1–32.9)
MCHC RBC AUTO-ENTMCNC: 31.8 G/DL (ref 31.4–35)
MCV RBC AUTO: 103.8 FL (ref 82–102)
NRBC # BLD: 0 K/UL (ref 0–0.2)
PLATELET # BLD AUTO: 170 K/UL (ref 150–450)
PMV BLD AUTO: 9.7 FL (ref 9.4–12.3)
RBC # BLD AUTO: 3.15 M/UL (ref 4.05–5.2)
WBC # BLD AUTO: 5.2 K/UL (ref 4.3–11.1)

## 2023-04-14 PROCEDURE — 36415 COLL VENOUS BLD VENIPUNCTURE: CPT

## 2023-04-14 PROCEDURE — 85027 COMPLETE CBC AUTOMATED: CPT

## 2023-04-28 ENCOUNTER — HOSPITAL ENCOUNTER (OUTPATIENT)
Dept: INFUSION THERAPY | Age: 78
Discharge: HOME | End: 2023-04-28
Payer: MEDICARE

## 2023-04-28 ENCOUNTER — HOSPITAL ENCOUNTER (OUTPATIENT)
Dept: LAB | Age: 78
Discharge: HOME | End: 2023-05-01

## 2023-04-28 VITALS
TEMPERATURE: 97.4 F | DIASTOLIC BLOOD PRESSURE: 69 MMHG | SYSTOLIC BLOOD PRESSURE: 134 MMHG | HEART RATE: 60 BPM | OXYGEN SATURATION: 97 % | RESPIRATION RATE: 16 BRPM

## 2023-04-28 DIAGNOSIS — N18.32 CHRONIC KIDNEY DISEASE, STAGE 3B (HCC): ICD-10-CM

## 2023-04-28 DIAGNOSIS — D64.9 ANEMIA, UNSPECIFIED TYPE: Primary | ICD-10-CM

## 2023-04-28 LAB
ERYTHROCYTE [DISTWIDTH] IN BLOOD BY AUTOMATED COUNT: 13.1 % (ref 11.9–14.6)
HCT VFR BLD AUTO: 29.7 % (ref 35.8–46.3)
HGB BLD-MCNC: 9.6 G/DL (ref 11.7–15.4)
MCH RBC QN AUTO: 32.9 PG (ref 26.1–32.9)
MCHC RBC AUTO-ENTMCNC: 32.3 G/DL (ref 31.4–35)
MCV RBC AUTO: 101.7 FL (ref 82–102)
NRBC # BLD: 0 K/UL (ref 0–0.2)
PLATELET # BLD AUTO: 189 K/UL (ref 150–450)
PMV BLD AUTO: 9.8 FL (ref 9.4–12.3)
RBC # BLD AUTO: 2.92 M/UL (ref 4.05–5.2)
WBC # BLD AUTO: 5.2 K/UL (ref 4.3–11.1)

## 2023-04-28 PROCEDURE — 96372 THER/PROPH/DIAG INJ SC/IM: CPT

## 2023-04-28 PROCEDURE — 36415 COLL VENOUS BLD VENIPUNCTURE: CPT

## 2023-04-28 PROCEDURE — 6360000002 HC RX W HCPCS: Performed by: INTERNAL MEDICINE

## 2023-04-28 PROCEDURE — 85027 COMPLETE CBC AUTOMATED: CPT

## 2023-04-28 RX ORDER — ALBUTEROL SULFATE 90 UG/1
4 AEROSOL, METERED RESPIRATORY (INHALATION) PRN
OUTPATIENT
Start: 2023-05-05

## 2023-04-28 RX ORDER — EPINEPHRINE 1 MG/ML
0.3 INJECTION, SOLUTION, CONCENTRATE INTRAVENOUS PRN
OUTPATIENT
Start: 2023-05-05

## 2023-04-28 RX ORDER — ACETAMINOPHEN 325 MG/1
650 TABLET ORAL
OUTPATIENT
Start: 2023-05-05

## 2023-04-28 RX ORDER — ONDANSETRON 2 MG/ML
8 INJECTION INTRAMUSCULAR; INTRAVENOUS
OUTPATIENT
Start: 2023-05-05

## 2023-04-28 RX ORDER — ONDANSETRON 2 MG/ML
4 INJECTION INTRAMUSCULAR; INTRAVENOUS ONCE
Status: COMPLETED | OUTPATIENT
Start: 2023-04-28 | End: 2023-04-28

## 2023-04-28 RX ORDER — ONDANSETRON 2 MG/ML
4 INJECTION INTRAMUSCULAR; INTRAVENOUS ONCE
Start: 2023-05-05 | End: 2023-05-05

## 2023-04-28 RX ORDER — DIPHENHYDRAMINE HYDROCHLORIDE 50 MG/ML
50 INJECTION INTRAMUSCULAR; INTRAVENOUS
OUTPATIENT
Start: 2023-05-05

## 2023-04-28 RX ORDER — SODIUM CHLORIDE 9 MG/ML
INJECTION, SOLUTION INTRAVENOUS CONTINUOUS
OUTPATIENT
Start: 2023-05-05

## 2023-04-28 RX ADMIN — ONDANSETRON 4 MG: 2 INJECTION INTRAMUSCULAR; INTRAVENOUS at 13:50

## 2023-04-28 RX ADMIN — EPOETIN ALFA-EPBX 20000 UNITS: 20000 INJECTION, SOLUTION INTRAVENOUS; SUBCUTANEOUS at 13:51

## 2023-04-28 NOTE — PROGRESS NOTES
Arrived to the [facility]. Qasim and Zofran completed. Provided education on SE to report to MD    Patient instructed to report any side affects to ordering provider. Patient tolerated well. Any issues or concerns during appointment: none. Patient aware of next infusion appointment on 05/12/23 (date) at 0930 (time). Discharged ambulatory.

## 2023-05-04 DIAGNOSIS — N18.30 STAGE 3 CHRONIC KIDNEY DISEASE, UNSPECIFIED WHETHER STAGE 3A OR 3B CKD (HCC): ICD-10-CM

## 2023-05-04 DIAGNOSIS — D64.9 ANEMIA, UNSPECIFIED TYPE: Primary | ICD-10-CM

## 2023-05-11 NOTE — PROGRESS NOTES
3 [facility] Hematology and Oncology: Office Visit Established Patient    Reason for follow up:    Macrocytic anemia    Overview: (copied from prior)    Ms. Carlos Holder is a 65years old female patient with history of hypertension, hyperlipidemia, chronic kidney disease, colon polyps, total abdominal hysterectomy with bilateral salpingo-oophorectomy and spontaneous pneumothorax who has been referred for evaluation of microcytic anemia. On 8/26/202022, she presented to her PCP with complaints of fatigue and shortness of breath with activity. Hemoglobin was down to 8.3 from 11 (in March 2022). On evaluation today, patient reports moderate persistent fatigue and exertional dyspnea with onset a few months ago. She denies fevers, night sweats, chest pain, bloody/black stools, nausea, vomiting, change in appetite, heartburn, headache, gait disturbances, tingling/numbness or weakness in any part of her body. She denies any swollen glands easy bleeding or bruising. She has intentionally lost about 24 pounds over the course of last few months by following healthier eating habits. She is scheduled to undergo EGD and colonoscopy on 10/19/2022. 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 EGD showed gastritis determined by biopsy. Interval history:  Patient reports significant improvement in her energy level since starting Retacrit injections. She has been able to play golf 2-3 times per week and has good energy. She denies chest pain, shortness of breath, nausea, vomiting, dizziness, abdominal pain, black or bloody stools. Last Retacrit injection was on 4/28/2023. Hemoglobin is 10 today. Review of Systems:  14 point ROS was negative except as per HPI      ECOG PERFORMANCE STATUS - 0-Fully active, able to carry on all pre-disease performance without restriction. Pain - /10. None/Minimal pain - not affecting QOL     Fatigue - No flowsheet data found.   Distress - No

## 2023-05-12 ENCOUNTER — OFFICE VISIT (OUTPATIENT)
Dept: ONCOLOGY | Age: 78
End: 2023-05-12

## 2023-05-12 ENCOUNTER — HOSPITAL ENCOUNTER (OUTPATIENT)
Dept: LAB | Age: 78
Discharge: HOME | End: 2023-05-12
Payer: COMMERCIAL

## 2023-05-12 ENCOUNTER — HOSPITAL ENCOUNTER (OUTPATIENT)
Dept: INFUSION THERAPY | Age: 78
Discharge: HOME | End: 2023-05-12

## 2023-05-12 VITALS
BODY MASS INDEX: 32.09 KG/M2 | SYSTOLIC BLOOD PRESSURE: 131 MMHG | WEIGHT: 174.4 LBS | DIASTOLIC BLOOD PRESSURE: 66 MMHG | HEIGHT: 62 IN | TEMPERATURE: 97.7 F | OXYGEN SATURATION: 97 % | HEART RATE: 67 BPM | RESPIRATION RATE: 16 BRPM

## 2023-05-12 DIAGNOSIS — D75.89 MACROCYTOSIS: ICD-10-CM

## 2023-05-12 DIAGNOSIS — D64.9 ANEMIA, UNSPECIFIED TYPE: ICD-10-CM

## 2023-05-12 DIAGNOSIS — D64.9 ANEMIA, UNSPECIFIED TYPE: Primary | ICD-10-CM

## 2023-05-12 DIAGNOSIS — N18.30 STAGE 3 CHRONIC KIDNEY DISEASE, UNSPECIFIED WHETHER STAGE 3A OR 3B CKD (HCC): ICD-10-CM

## 2023-05-12 LAB
ALBUMIN SERPL-MCNC: 3.4 G/DL (ref 3.2–4.6)
ALBUMIN/GLOB SERPL: 0.7 (ref 0.4–1.6)
ALP SERPL-CCNC: 61 U/L (ref 50–136)
ALT SERPL-CCNC: 13 U/L (ref 12–65)
ANION GAP SERPL CALC-SCNC: 4 MMOL/L (ref 2–11)
AST SERPL-CCNC: 24 U/L (ref 15–37)
BASOPHILS # BLD: 0.1 K/UL (ref 0–0.2)
BASOPHILS NFR BLD: 1 % (ref 0–2)
BILIRUB SERPL-MCNC: 0.3 MG/DL (ref 0.2–1.1)
BUN SERPL-MCNC: 23 MG/DL (ref 8–23)
CALCIUM SERPL-MCNC: 9.2 MG/DL (ref 8.3–10.4)
CHLORIDE SERPL-SCNC: 113 MMOL/L (ref 101–110)
CO2 SERPL-SCNC: 25 MMOL/L (ref 21–32)
CREAT SERPL-MCNC: 1.9 MG/DL (ref 0.6–1)
DIFFERENTIAL METHOD BLD: ABNORMAL
EOSINOPHIL # BLD: 0.1 K/UL (ref 0–0.8)
EOSINOPHIL NFR BLD: 3 % (ref 0.5–7.8)
ERYTHROCYTE [DISTWIDTH] IN BLOOD BY AUTOMATED COUNT: 13.9 % (ref 11.9–14.6)
GLOBULIN SER CALC-MCNC: 4.7 G/DL (ref 2.8–4.5)
GLUCOSE SERPL-MCNC: 75 MG/DL (ref 65–100)
HCT VFR BLD AUTO: 31.8 % (ref 35.8–46.3)
HGB BLD-MCNC: 10 G/DL (ref 11.7–15.4)
IMM GRANULOCYTES # BLD AUTO: 0 K/UL (ref 0–0.5)
IMM GRANULOCYTES NFR BLD AUTO: 0 % (ref 0–5)
LYMPHOCYTES # BLD: 1.5 K/UL (ref 0.5–4.6)
LYMPHOCYTES NFR BLD: 35 % (ref 13–44)
MCH RBC QN AUTO: 32.3 PG (ref 26.1–32.9)
MCHC RBC AUTO-ENTMCNC: 31.4 G/DL (ref 31.4–35)
MCV RBC AUTO: 102.6 FL (ref 82–102)
MONOCYTES # BLD: 0.2 K/UL (ref 0.1–1.3)
MONOCYTES NFR BLD: 5 % (ref 4–12)
NEUTS SEG # BLD: 2.4 K/UL (ref 1.7–8.2)
NEUTS SEG NFR BLD: 55 % (ref 43–78)
NRBC # BLD: 0 K/UL (ref 0–0.2)
PLATELET # BLD AUTO: 158 K/UL (ref 150–450)
PMV BLD AUTO: 9.6 FL (ref 9.4–12.3)
POTASSIUM SERPL-SCNC: 4.2 MMOL/L (ref 3.5–5.1)
PROT SERPL-MCNC: 8.1 G/DL (ref 6.3–8.2)
RBC # BLD AUTO: 3.1 M/UL (ref 4.05–5.2)
SODIUM SERPL-SCNC: 142 MMOL/L (ref 133–143)
WBC # BLD AUTO: 4.4 K/UL (ref 4.3–11.1)

## 2023-05-12 PROCEDURE — 36415 COLL VENOUS BLD VENIPUNCTURE: CPT

## 2023-05-12 PROCEDURE — 85025 COMPLETE CBC W/AUTO DIFF WBC: CPT

## 2023-05-12 PROCEDURE — 80053 COMPREHEN METABOLIC PANEL: CPT

## 2023-05-12 ASSESSMENT — PATIENT HEALTH QUESTIONNAIRE - PHQ9
SUM OF ALL RESPONSES TO PHQ QUESTIONS 1-9: 0
1. LITTLE INTEREST OR PLEASURE IN DOING THINGS: 0
SUM OF ALL RESPONSES TO PHQ9 QUESTIONS 1 & 2: 0
SUM OF ALL RESPONSES TO PHQ QUESTIONS 1-9: 0
2. FEELING DOWN, DEPRESSED OR HOPELESS: 0

## 2023-06-16 ENCOUNTER — HOSPITAL ENCOUNTER (OUTPATIENT)
Dept: INFUSION THERAPY | Age: 78
Discharge: HOME | End: 2023-06-16
Payer: MEDICARE

## 2023-06-16 ENCOUNTER — HOSPITAL ENCOUNTER (OUTPATIENT)
Dept: LAB | Age: 78
Discharge: HOME | End: 2023-06-19
Payer: MEDICARE

## 2023-06-16 DIAGNOSIS — R79.89 ELEVATED SERUM CREATININE: ICD-10-CM

## 2023-06-16 DIAGNOSIS — D64.9 ANEMIA, UNSPECIFIED TYPE: ICD-10-CM

## 2023-06-16 DIAGNOSIS — N18.32 STAGE 3B CHRONIC KIDNEY DISEASE (HCC): ICD-10-CM

## 2023-06-16 DIAGNOSIS — N18.32 CHRONIC KIDNEY DISEASE, STAGE 3B (HCC): Primary | ICD-10-CM

## 2023-06-16 LAB
ALBUMIN SERPL-MCNC: 3.4 G/DL (ref 3.2–4.6)
ALBUMIN/GLOB SERPL: 0.8 (ref 0.4–1.6)
ALP SERPL-CCNC: 57 U/L (ref 50–136)
ALT SERPL-CCNC: 17 U/L (ref 12–65)
ANION GAP SERPL CALC-SCNC: 5 MMOL/L (ref 2–11)
AST SERPL-CCNC: 33 U/L (ref 15–37)
BASOPHILS # BLD: 0.1 K/UL (ref 0–0.2)
BASOPHILS NFR BLD: 2 % (ref 0–2)
BILIRUB SERPL-MCNC: 0.2 MG/DL (ref 0.2–1.1)
BUN SERPL-MCNC: 25 MG/DL (ref 8–23)
CALCIUM SERPL-MCNC: 9 MG/DL (ref 8.3–10.4)
CHLORIDE SERPL-SCNC: 110 MMOL/L (ref 101–110)
CO2 SERPL-SCNC: 27 MMOL/L (ref 21–32)
CREAT SERPL-MCNC: 2 MG/DL (ref 0.6–1)
DIFFERENTIAL METHOD BLD: ABNORMAL
EOSINOPHIL # BLD: 0.2 K/UL (ref 0–0.8)
EOSINOPHIL NFR BLD: 4 % (ref 0.5–7.8)
ERYTHROCYTE [DISTWIDTH] IN BLOOD BY AUTOMATED COUNT: 14.1 % (ref 11.9–14.6)
FERRITIN SERPL-MCNC: 319 NG/ML (ref 8–388)
GLOBULIN SER CALC-MCNC: 4.4 G/DL (ref 2.8–4.5)
GLUCOSE SERPL-MCNC: 90 MG/DL (ref 65–100)
HCT VFR BLD AUTO: 30.4 % (ref 35.8–46.3)
HGB BLD-MCNC: 9.6 G/DL (ref 11.7–15.4)
IMM GRANULOCYTES # BLD AUTO: 0 K/UL (ref 0–0.5)
IMM GRANULOCYTES NFR BLD AUTO: 1 % (ref 0–5)
IRON SATN MFR SERPL: 27 %
IRON SERPL-MCNC: 74 UG/DL (ref 35–150)
LYMPHOCYTES # BLD: 1.8 K/UL (ref 0.5–4.6)
LYMPHOCYTES NFR BLD: 46 % (ref 13–44)
MCH RBC QN AUTO: 32.2 PG (ref 26.1–32.9)
MCHC RBC AUTO-ENTMCNC: 31.6 G/DL (ref 31.4–35)
MCV RBC AUTO: 102 FL (ref 82–102)
MONOCYTES # BLD: 0.2 K/UL (ref 0.1–1.3)
MONOCYTES NFR BLD: 6 % (ref 4–12)
NEUTS SEG # BLD: 1.7 K/UL (ref 1.7–8.2)
NEUTS SEG NFR BLD: 43 % (ref 43–78)
NRBC # BLD: 0 K/UL (ref 0–0.2)
PLATELET # BLD AUTO: 170 K/UL (ref 150–450)
PMV BLD AUTO: 10.6 FL (ref 9.4–12.3)
POTASSIUM SERPL-SCNC: 3.8 MMOL/L (ref 3.5–5.1)
PROT SERPL-MCNC: 7.8 G/DL (ref 6.3–8.2)
RBC # BLD AUTO: 2.98 M/UL (ref 4.05–5.2)
SODIUM SERPL-SCNC: 142 MMOL/L (ref 133–143)
TIBC SERPL-MCNC: 272 UG/DL (ref 250–450)
WBC # BLD AUTO: 4 K/UL (ref 4.3–11.1)

## 2023-06-16 PROCEDURE — 96374 THER/PROPH/DIAG INJ IV PUSH: CPT

## 2023-06-16 PROCEDURE — 85025 COMPLETE CBC W/AUTO DIFF WBC: CPT

## 2023-06-16 PROCEDURE — 2580000003 HC RX 258: Performed by: INTERNAL MEDICINE

## 2023-06-16 PROCEDURE — 83540 ASSAY OF IRON: CPT

## 2023-06-16 PROCEDURE — 96372 THER/PROPH/DIAG INJ SC/IM: CPT

## 2023-06-16 PROCEDURE — 80053 COMPREHEN METABOLIC PANEL: CPT

## 2023-06-16 PROCEDURE — 82728 ASSAY OF FERRITIN: CPT

## 2023-06-16 PROCEDURE — 84238 ASSAY NONENDOCRINE RECEPTOR: CPT

## 2023-06-16 PROCEDURE — 6360000002 HC RX W HCPCS: Performed by: INTERNAL MEDICINE

## 2023-06-16 PROCEDURE — 83550 IRON BINDING TEST: CPT

## 2023-06-16 PROCEDURE — 36415 COLL VENOUS BLD VENIPUNCTURE: CPT

## 2023-06-16 RX ORDER — 0.9 % SODIUM CHLORIDE 0.9 %
1000 INTRAVENOUS SOLUTION INTRAVENOUS ONCE
Status: COMPLETED | OUTPATIENT
Start: 2023-06-16 | End: 2023-06-16

## 2023-06-16 RX ORDER — ACETAMINOPHEN 325 MG/1
650 TABLET ORAL
OUTPATIENT
Start: 2023-06-18

## 2023-06-16 RX ORDER — ONDANSETRON 2 MG/ML
4 INJECTION INTRAMUSCULAR; INTRAVENOUS ONCE
Status: COMPLETED | OUTPATIENT
Start: 2023-06-16 | End: 2023-06-16

## 2023-06-16 RX ORDER — ONDANSETRON 2 MG/ML
8 INJECTION INTRAMUSCULAR; INTRAVENOUS
OUTPATIENT
Start: 2023-06-18

## 2023-06-16 RX ORDER — 0.9 % SODIUM CHLORIDE 0.9 %
1000 INTRAVENOUS SOLUTION INTRAVENOUS ONCE
Status: CANCELLED | OUTPATIENT
Start: 2023-06-16 | End: 2023-06-16

## 2023-06-16 RX ORDER — ALBUTEROL SULFATE 90 UG/1
4 AEROSOL, METERED RESPIRATORY (INHALATION) PRN
OUTPATIENT
Start: 2023-06-18

## 2023-06-16 RX ORDER — SODIUM CHLORIDE 0.9 % (FLUSH) 0.9 %
5-40 SYRINGE (ML) INJECTION PRN
OUTPATIENT
Start: 2023-06-16

## 2023-06-16 RX ORDER — SODIUM CHLORIDE 9 MG/ML
INJECTION, SOLUTION INTRAVENOUS CONTINUOUS
OUTPATIENT
Start: 2023-06-18

## 2023-06-16 RX ORDER — ONDANSETRON 2 MG/ML
4 INJECTION INTRAMUSCULAR; INTRAVENOUS ONCE
Start: 2023-06-18 | End: 2023-06-18

## 2023-06-16 RX ORDER — HEPARIN SODIUM 100 [USP'U]/ML
500 INJECTION, SOLUTION INTRAVENOUS PRN
OUTPATIENT
Start: 2023-06-16

## 2023-06-16 RX ORDER — SODIUM CHLORIDE 9 MG/ML
5-250 INJECTION, SOLUTION INTRAVENOUS PRN
OUTPATIENT
Start: 2023-06-16

## 2023-06-16 RX ORDER — DIPHENHYDRAMINE HYDROCHLORIDE 50 MG/ML
50 INJECTION INTRAMUSCULAR; INTRAVENOUS
OUTPATIENT
Start: 2023-06-18

## 2023-06-16 RX ORDER — EPINEPHRINE 1 MG/ML
0.3 INJECTION, SOLUTION, CONCENTRATE INTRAVENOUS PRN
OUTPATIENT
Start: 2023-06-18

## 2023-06-16 RX ADMIN — ONDANSETRON 4 MG: 2 INJECTION INTRAMUSCULAR; INTRAVENOUS at 09:39

## 2023-06-16 RX ADMIN — SODIUM CHLORIDE 1000 ML: 9 INJECTION, SOLUTION INTRAVENOUS at 09:30

## 2023-06-16 RX ADMIN — EPOETIN ALFA-EPBX 20000 UNITS: 20000 INJECTION, SOLUTION INTRAVENOUS; SUBCUTANEOUS at 09:40

## 2023-06-16 NOTE — PROGRESS NOTES
Arrived to the [facility]. 1L NS, zofran, and retacrit completed. Provided education on same. Patient instructed to report any side affects to ordering provider. Patient tolerated well. Any issues or concerns during appointment: none. Patient aware of next infusion appointment on 7/28  Discharged ambulatory.

## 2023-06-19 LAB — TRANSFERRIN SERPL-SCNC: 13.9 NMOL/L (ref 12.2–27.3)

## 2023-07-26 DIAGNOSIS — D64.9 ANEMIA, UNSPECIFIED TYPE: Primary | ICD-10-CM

## 2023-07-27 NOTE — PROGRESS NOTES
[facility] Hematology and Oncology: Office Visit Established Patient    Reason for follow up:    Macrocytic anemia    Overview: (copied from prior)    Ms. Kendal Jackson is a 65years old female patient with history of hypertension, hyperlipidemia, chronic kidney disease, colon polyps, total abdominal hysterectomy with bilateral salpingo-oophorectomy and spontaneous pneumothorax who has been referred for evaluation of microcytic anemia. On 8/26/202022, she presented to her PCP with complaints of fatigue and shortness of breath with activity. Hemoglobin was down to 8.3 from 11 (in March 2022). On evaluation today, patient reports moderate persistent fatigue and exertional dyspnea with onset a few months ago. She denies fevers, night sweats, chest pain, bloody/black stools, nausea, vomiting, change in appetite, heartburn, headache, gait disturbances, tingling/numbness or weakness in any part of her body. She denies any swollen glands easy bleeding or bruising. She has intentionally lost about 24 pounds over the course of last few months by following healthier eating habits. She is scheduled to undergo EGD and colonoscopy on 10/19/2022. 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 EGD showed gastritis determined by biopsy. Interval history:  Patient returns for follow-up evaluation and review of labs and imaging. She reports good energy level. She denies chest pain, shortness of breath, dizziness, nausea, vomiting, black or bloody stools, hematemesis, cough or infectious symptoms. Hemoglobin is 9.4 on labs today. Review of Systems:  14 point ROS was negative except as per HPI      ECOG PERFORMANCE STATUS - 0-Fully active, able to carry on all pre-disease performance without restriction. Pain - /10. None/Minimal pain - not affecting QOL     Fatigue - No flowsheet data found. Distress - No flowsheet data found.   10/19/2022:  Colonoscopy showed benign neoplasm of

## 2023-07-28 ENCOUNTER — OFFICE VISIT (OUTPATIENT)
Dept: ONCOLOGY | Age: 78
End: 2023-07-28
Payer: MEDICARE

## 2023-07-28 ENCOUNTER — HOSPITAL ENCOUNTER (OUTPATIENT)
Dept: LAB | Age: 78
End: 2023-07-28
Payer: MEDICARE

## 2023-07-28 ENCOUNTER — HOSPITAL ENCOUNTER (OUTPATIENT)
Dept: INFUSION THERAPY | Age: 78
Discharge: HOME | End: 2023-07-28
Payer: MEDICARE

## 2023-07-28 VITALS
OXYGEN SATURATION: 99 % | RESPIRATION RATE: 18 BRPM | HEIGHT: 62 IN | WEIGHT: 176.6 LBS | DIASTOLIC BLOOD PRESSURE: 66 MMHG | SYSTOLIC BLOOD PRESSURE: 96 MMHG | TEMPERATURE: 97.9 F | BODY MASS INDEX: 32.5 KG/M2 | HEART RATE: 59 BPM

## 2023-07-28 DIAGNOSIS — D64.9 ANEMIA, UNSPECIFIED TYPE: ICD-10-CM

## 2023-07-28 DIAGNOSIS — N18.32 CHRONIC KIDNEY DISEASE, STAGE 3B (HCC): Primary | ICD-10-CM

## 2023-07-28 DIAGNOSIS — D64.9 ANEMIA, UNSPECIFIED TYPE: Primary | ICD-10-CM

## 2023-07-28 DIAGNOSIS — D50.9 IRON DEFICIENCY ANEMIA, UNSPECIFIED IRON DEFICIENCY ANEMIA TYPE: ICD-10-CM

## 2023-07-28 LAB
ALBUMIN SERPL-MCNC: 3.6 G/DL (ref 3.2–4.6)
ALBUMIN/GLOB SERPL: 0.9 (ref 0.4–1.6)
ALP SERPL-CCNC: 57 U/L (ref 50–136)
ALT SERPL-CCNC: 13 U/L (ref 12–65)
ANION GAP SERPL CALC-SCNC: 6 MMOL/L (ref 2–11)
AST SERPL-CCNC: 23 U/L (ref 15–37)
BASOPHILS # BLD: 0.1 K/UL (ref 0–0.2)
BASOPHILS NFR BLD: 1 % (ref 0–2)
BILIRUB SERPL-MCNC: 0.3 MG/DL (ref 0.2–1.1)
BUN SERPL-MCNC: 21 MG/DL (ref 8–23)
CALCIUM SERPL-MCNC: 9.1 MG/DL (ref 8.3–10.4)
CHLORIDE SERPL-SCNC: 106 MMOL/L (ref 101–110)
CO2 SERPL-SCNC: 25 MMOL/L (ref 21–32)
CREAT SERPL-MCNC: 1.8 MG/DL (ref 0.6–1)
DIFFERENTIAL METHOD BLD: ABNORMAL
EOSINOPHIL # BLD: 0.1 K/UL (ref 0–0.8)
EOSINOPHIL NFR BLD: 3 % (ref 0.5–7.8)
ERYTHROCYTE [DISTWIDTH] IN BLOOD BY AUTOMATED COUNT: 14.2 % (ref 11.9–14.6)
GLOBULIN SER CALC-MCNC: 4.2 G/DL (ref 2.8–4.5)
GLUCOSE SERPL-MCNC: 83 MG/DL (ref 65–100)
HCT VFR BLD AUTO: 30.3 % (ref 35.8–46.3)
HGB BLD-MCNC: 9.4 G/DL (ref 11.7–15.4)
IMM GRANULOCYTES # BLD AUTO: 0 K/UL (ref 0–0.5)
IMM GRANULOCYTES NFR BLD AUTO: 1 % (ref 0–5)
LYMPHOCYTES # BLD: 1.7 K/UL (ref 0.5–4.6)
LYMPHOCYTES NFR BLD: 39 % (ref 13–44)
MCH RBC QN AUTO: 31.6 PG (ref 26.1–32.9)
MCHC RBC AUTO-ENTMCNC: 31 G/DL (ref 31.4–35)
MCV RBC AUTO: 102 FL (ref 82–102)
MONOCYTES # BLD: 0.2 K/UL (ref 0.1–1.3)
MONOCYTES NFR BLD: 5 % (ref 4–12)
NEUTS SEG # BLD: 2.3 K/UL (ref 1.7–8.2)
NEUTS SEG NFR BLD: 51 % (ref 43–78)
NRBC # BLD: 0 K/UL (ref 0–0.2)
PLATELET # BLD AUTO: 185 K/UL (ref 150–450)
PMV BLD AUTO: 9.8 FL (ref 9.4–12.3)
POTASSIUM SERPL-SCNC: 4.6 MMOL/L (ref 3.5–5.1)
PROT SERPL-MCNC: 7.8 G/DL (ref 6.3–8.2)
RBC # BLD AUTO: 2.97 M/UL (ref 4.05–5.2)
SODIUM SERPL-SCNC: 137 MMOL/L (ref 133–143)
WBC # BLD AUTO: 4.4 K/UL (ref 4.3–11.1)

## 2023-07-28 PROCEDURE — 36415 COLL VENOUS BLD VENIPUNCTURE: CPT

## 2023-07-28 PROCEDURE — 80053 COMPREHEN METABOLIC PANEL: CPT

## 2023-07-28 PROCEDURE — 3074F SYST BP LT 130 MM HG: CPT | Performed by: INTERNAL MEDICINE

## 2023-07-28 PROCEDURE — G8417 CALC BMI ABV UP PARAM F/U: HCPCS | Performed by: INTERNAL MEDICINE

## 2023-07-28 PROCEDURE — 85025 COMPLETE CBC W/AUTO DIFF WBC: CPT

## 2023-07-28 PROCEDURE — 1036F TOBACCO NON-USER: CPT | Performed by: INTERNAL MEDICINE

## 2023-07-28 PROCEDURE — 99214 OFFICE O/P EST MOD 30 MIN: CPT | Performed by: INTERNAL MEDICINE

## 2023-07-28 PROCEDURE — G8399 PT W/DXA RESULTS DOCUMENT: HCPCS | Performed by: INTERNAL MEDICINE

## 2023-07-28 PROCEDURE — 1123F ACP DISCUSS/DSCN MKR DOCD: CPT | Performed by: INTERNAL MEDICINE

## 2023-07-28 PROCEDURE — 96372 THER/PROPH/DIAG INJ SC/IM: CPT

## 2023-07-28 PROCEDURE — G8427 DOCREV CUR MEDS BY ELIG CLIN: HCPCS | Performed by: INTERNAL MEDICINE

## 2023-07-28 PROCEDURE — 3078F DIAST BP <80 MM HG: CPT | Performed by: INTERNAL MEDICINE

## 2023-07-28 PROCEDURE — 1090F PRES/ABSN URINE INCON ASSESS: CPT | Performed by: INTERNAL MEDICINE

## 2023-07-28 PROCEDURE — 6360000002 HC RX W HCPCS: Performed by: INTERNAL MEDICINE

## 2023-07-28 RX ORDER — DIPHENHYDRAMINE HYDROCHLORIDE 50 MG/ML
50 INJECTION INTRAMUSCULAR; INTRAVENOUS
OUTPATIENT
Start: 2023-07-30

## 2023-07-28 RX ORDER — ONDANSETRON 2 MG/ML
4 INJECTION INTRAMUSCULAR; INTRAVENOUS ONCE
Start: 2023-07-30 | End: 2023-07-30

## 2023-07-28 RX ORDER — EPINEPHRINE 1 MG/ML
0.3 INJECTION, SOLUTION, CONCENTRATE INTRAVENOUS PRN
OUTPATIENT
Start: 2023-07-30

## 2023-07-28 RX ORDER — SODIUM CHLORIDE 9 MG/ML
INJECTION, SOLUTION INTRAVENOUS CONTINUOUS
OUTPATIENT
Start: 2023-07-30

## 2023-07-28 RX ORDER — ONDANSETRON 2 MG/ML
4 INJECTION INTRAMUSCULAR; INTRAVENOUS ONCE
Status: COMPLETED | OUTPATIENT
Start: 2023-07-28 | End: 2023-07-28

## 2023-07-28 RX ORDER — ONDANSETRON 2 MG/ML
8 INJECTION INTRAMUSCULAR; INTRAVENOUS
OUTPATIENT
Start: 2023-07-30

## 2023-07-28 RX ORDER — ACETAMINOPHEN 325 MG/1
650 TABLET ORAL
OUTPATIENT
Start: 2023-07-30

## 2023-07-28 RX ORDER — ALBUTEROL SULFATE 90 UG/1
4 AEROSOL, METERED RESPIRATORY (INHALATION) PRN
OUTPATIENT
Start: 2023-07-30

## 2023-07-28 RX ADMIN — EPOETIN ALFA-EPBX 20000 UNITS: 10000 INJECTION, SOLUTION INTRAVENOUS; SUBCUTANEOUS at 09:38

## 2023-07-28 RX ADMIN — ONDANSETRON 4 MG: 2 INJECTION INTRAMUSCULAR; INTRAVENOUS at 09:37

## 2023-07-28 ASSESSMENT — PATIENT HEALTH QUESTIONNAIRE - PHQ9
SUM OF ALL RESPONSES TO PHQ QUESTIONS 1-9: 0
SUM OF ALL RESPONSES TO PHQ QUESTIONS 1-9: 0
2. FEELING DOWN, DEPRESSED OR HOPELESS: 0
1. LITTLE INTEREST OR PLEASURE IN DOING THINGS: 0
SUM OF ALL RESPONSES TO PHQ QUESTIONS 1-9: 0
SUM OF ALL RESPONSES TO PHQ QUESTIONS 1-9: 0
SUM OF ALL RESPONSES TO PHQ9 QUESTIONS 1 & 2: 0

## 2023-07-28 NOTE — PROGRESS NOTES
Arrived to the [address] [place] [address]. [facility]. Zofran and retacrit completed. Provided education on retacrit and Zofran. Patient instructed to report any side affects to ordering provider. Patient tolerated well. Any issues or concerns during appointment: none. Patient aware of next infusion appointment on 9/8/23 (date) at 1330 (time). Discharged ambulatory.

## 2023-07-28 NOTE — PATIENT INSTRUCTIONS
Patient Instructions from Today's Visit    Reason for Visit:  Follow up visit for anemia      Plan:    Retacrit today.     Follow Up:  - 6 weeks    Recent Lab Results:  Hospital Outpatient Visit on 07/28/2023   Component Date Value Ref Range Status    WBC 07/28/2023 4.4  4.3 - 11.1 K/uL Final    RBC 07/28/2023 2.97 (L)  4.05 - 5.2 M/uL Final    Hemoglobin 07/28/2023 9.4 (L)  11.7 - 15.4 g/dL Final    Hematocrit 07/28/2023 30.3 (L)  35.8 - 46.3 % Final    MCV 07/28/2023 102.0  82.0 - 102.0 FL Final    MCH 07/28/2023 31.6  26.1 - 32.9 PG Final    MCHC 07/28/2023 31.0 (L)  31.4 - 35.0 g/dL Final    RDW 07/28/2023 14.2  11.9 - 14.6 % Final    Platelets 03/62/7558 185  150 - 450 K/uL Final    MPV 07/28/2023 9.8  9.4 - 12.3 FL Final    nRBC 07/28/2023 0.00  0.0 - 0.2 K/uL Final    **Note: Absolute NRBC parameter is now reported with Hemogram**    Neutrophils % 07/28/2023 51  43 - 78 % Final    Lymphocytes % 07/28/2023 39  13 - 44 % Final    Monocytes % 07/28/2023 5  4.0 - 12.0 % Final    Eosinophils % 07/28/2023 3  0.5 - 7.8 % Final    Basophils % 07/28/2023 1  0.0 - 2.0 % Final    Immature Granulocytes 07/28/2023 1  0.0 - 5.0 % Final    Neutrophils Absolute 07/28/2023 2.3  1.7 - 8.2 K/UL Final    Lymphocytes Absolute 07/28/2023 1.7  0.5 - 4.6 K/UL Final    Monocytes Absolute 07/28/2023 0.2  0.1 - 1.3 K/UL Final    Eosinophils Absolute 07/28/2023 0.1  0.0 - 0.8 K/UL Final    Basophils Absolute 07/28/2023 0.1  0.0 - 0.2 K/UL Final    Absolute Immature Granulocyte 07/28/2023 0.0  0.0 - 0.5 K/UL Final    Differential Type 07/28/2023 AUTOMATED    Final    Sodium 07/28/2023 137  133 - 143 mmol/L Final    Potassium 07/28/2023 4.6  3.5 - 5.1 mmol/L Final    Chloride 07/28/2023 106  101 - 110 mmol/L Final    CO2 07/28/2023 25  21 - 32 mmol/L Final    Anion Gap 07/28/2023 6  2 - 11 mmol/L Final    Glucose 07/28/2023 83  65 - 100 mg/dL Final    BUN 07/28/2023 21  8 - 23 MG/DL Final    Creatinine 07/28/2023 1.80 (H)  0.6 - 1.0

## 2023-08-11 DIAGNOSIS — I10 ESSENTIAL HYPERTENSION: Primary | ICD-10-CM

## 2023-08-11 DIAGNOSIS — E78.5 HYPERLIPIDEMIA LDL GOAL <100: ICD-10-CM

## 2023-08-18 ENCOUNTER — NURSE ONLY (OUTPATIENT)
Dept: FAMILY MEDICINE CLINIC | Facility: CLINIC | Age: 78
End: 2023-08-18

## 2023-08-18 DIAGNOSIS — E78.5 HYPERLIPIDEMIA LDL GOAL <100: ICD-10-CM

## 2023-08-18 DIAGNOSIS — I10 ESSENTIAL HYPERTENSION: ICD-10-CM

## 2023-08-18 LAB
ALBUMIN SERPL-MCNC: 3.5 G/DL (ref 3.2–4.6)
ALBUMIN/GLOB SERPL: 1 (ref 0.4–1.6)
ALP SERPL-CCNC: 58 U/L (ref 50–136)
ALT SERPL-CCNC: 15 U/L (ref 12–65)
ANION GAP SERPL CALC-SCNC: 4 MMOL/L (ref 2–11)
AST SERPL-CCNC: 21 U/L (ref 15–37)
BILIRUB SERPL-MCNC: 0.2 MG/DL (ref 0.2–1.1)
BUN SERPL-MCNC: 22 MG/DL (ref 8–23)
CALCIUM SERPL-MCNC: 9.1 MG/DL (ref 8.3–10.4)
CHLORIDE SERPL-SCNC: 114 MMOL/L (ref 101–110)
CHOLEST SERPL-MCNC: 143 MG/DL
CO2 SERPL-SCNC: 27 MMOL/L (ref 21–32)
CREAT SERPL-MCNC: 1.8 MG/DL (ref 0.6–1)
GLOBULIN SER CALC-MCNC: 3.5 G/DL (ref 2.8–4.5)
GLUCOSE SERPL-MCNC: 71 MG/DL (ref 65–100)
HDLC SERPL-MCNC: 36 MG/DL (ref 40–60)
HDLC SERPL: 4
LDLC SERPL CALC-MCNC: 52.8 MG/DL
POTASSIUM SERPL-SCNC: 4.3 MMOL/L (ref 3.5–5.1)
PROT SERPL-MCNC: 7 G/DL (ref 6.3–8.2)
SODIUM SERPL-SCNC: 145 MMOL/L (ref 133–143)
TRIGL SERPL-MCNC: 271 MG/DL (ref 35–150)
VLDLC SERPL CALC-MCNC: 54.2 MG/DL (ref 6–23)

## 2023-08-25 ENCOUNTER — OFFICE VISIT (OUTPATIENT)
Dept: FAMILY MEDICINE CLINIC | Facility: CLINIC | Age: 78
End: 2023-08-25

## 2023-08-25 VITALS
HEART RATE: 36 BPM | DIASTOLIC BLOOD PRESSURE: 70 MMHG | WEIGHT: 175.4 LBS | SYSTOLIC BLOOD PRESSURE: 120 MMHG | RESPIRATION RATE: 16 BRPM | BODY MASS INDEX: 32.28 KG/M2 | OXYGEN SATURATION: 96 % | HEIGHT: 62 IN

## 2023-08-25 DIAGNOSIS — N18.4 STAGE 4 CHRONIC KIDNEY DISEASE (HCC): ICD-10-CM

## 2023-08-25 DIAGNOSIS — I10 ESSENTIAL HYPERTENSION: ICD-10-CM

## 2023-08-25 DIAGNOSIS — I49.5 SICK SINUS SYNDROME (HCC): Primary | ICD-10-CM

## 2023-08-25 DIAGNOSIS — Z23 IMMUNIZATION DUE: ICD-10-CM

## 2023-08-25 DIAGNOSIS — E78.5 HYPERLIPIDEMIA LDL GOAL <100: ICD-10-CM

## 2023-08-25 ASSESSMENT — ENCOUNTER SYMPTOMS
NAUSEA: 0
VOMITING: 0
BLOOD IN STOOL: 0
COUGH: 0
SHORTNESS OF BREATH: 0
ABDOMINAL PAIN: 0

## 2023-08-25 NOTE — PROGRESS NOTES
breath. Cardiovascular:  Negative for chest pain. Gastrointestinal:  Negative for abdominal pain, blood in stool, nausea and vomiting. Neurological:  Negative for dizziness. Psychiatric/Behavioral:  The patient is not nervous/anxious. History:  Past Medical History:   Diagnosis Date    CKD (chronic kidney disease) stage 3, GFR 30-59 ml/min ([facility])     Essential hypertension     Hyperlipidemia LDL goal <100     Menopause     S/P colonoscopy 2017 2022 to 2027    Spontaneous pneumothorax 2019    SSS (sick sinus syndrome) ([address])        Past Surgical History:   Procedure Laterality Date    BREAST BIOPSY Right     BREAST BIOPSY Left     CHOLECYSTECTOMY      COLONOSCOPY      10/2022    ESOPHAGOGASTRODUODENOSCOPY  10/2022    HYSTERECTOMY, TOTAL ABDOMINAL (CERVIX REMOVED)      MICHAEL AND BSO (CERVIX REMOVED)         Current Outpatient Medications   Medication Sig Dispense Refill    ezetimibe (ZETIA) 10 MG tablet TAKE ONE TABLET BY MOUTH ONE TIME DAILY for cholesterol 90 tablet 3    simvastatin (ZOCOR) 40 MG tablet TAKE ONE TABLET BY MOUTH EVERY NIGHT for cholesterol 90 tablet 3    folic acid (FOLVITE) 1 MG tablet Take 1 tablet by mouth daily 90 tablet 1    potassium chloride (KLOR-CON M) 20 MEQ extended release tablet Take 2 tablets by mouth 2 times daily 360 tablet 1    ferrous sulfate (IRON 325) 325 (65 Fe) MG tablet Take 1 tablet by mouth daily      hydrALAZINE (APRESOLINE) 25 MG tablet Take 1 tablet by mouth 3 times daily 270 tablet 3    Multiple Vitamins-Minerals (PRESERVISION AREDS 2 PO) Take by mouth      aspirin 81 MG EC tablet Take by mouth daily      Biotin 2.5 MG TABS Take 5,000 mcg by mouth daily      Cholecalciferol 50 MCG (2000 UT) TABS Take by mouth      nitroGLYCERIN (NITROSTAT) 0.4 MG SL tablet Place 1 tablet under the tongue      omeprazole (PRILOSEC) 20 MG delayed release capsule Take 1 capsule by mouth daily as needed       No current facility-administered medications for this visit.

## 2023-09-06 ENCOUNTER — OFFICE VISIT (OUTPATIENT)
Age: 78
End: 2023-09-06
Payer: MEDICARE

## 2023-09-06 VITALS
HEART RATE: 70 BPM | HEIGHT: 62 IN | DIASTOLIC BLOOD PRESSURE: 66 MMHG | BODY MASS INDEX: 31.83 KG/M2 | WEIGHT: 173 LBS | SYSTOLIC BLOOD PRESSURE: 124 MMHG

## 2023-09-06 DIAGNOSIS — I10 ESSENTIAL (PRIMARY) HYPERTENSION: Primary | ICD-10-CM

## 2023-09-06 DIAGNOSIS — I49.5 SICK SINUS SYNDROME (HCC): ICD-10-CM

## 2023-09-06 DIAGNOSIS — I44.7 LEFT BUNDLE BRANCH BLOCK: ICD-10-CM

## 2023-09-06 PROCEDURE — 3078F DIAST BP <80 MM HG: CPT | Performed by: INTERNAL MEDICINE

## 2023-09-06 PROCEDURE — 3074F SYST BP LT 130 MM HG: CPT | Performed by: INTERNAL MEDICINE

## 2023-09-06 PROCEDURE — G8399 PT W/DXA RESULTS DOCUMENT: HCPCS | Performed by: INTERNAL MEDICINE

## 2023-09-06 PROCEDURE — 1090F PRES/ABSN URINE INCON ASSESS: CPT | Performed by: INTERNAL MEDICINE

## 2023-09-06 PROCEDURE — G8417 CALC BMI ABV UP PARAM F/U: HCPCS | Performed by: INTERNAL MEDICINE

## 2023-09-06 PROCEDURE — 1123F ACP DISCUSS/DSCN MKR DOCD: CPT | Performed by: INTERNAL MEDICINE

## 2023-09-06 PROCEDURE — G8427 DOCREV CUR MEDS BY ELIG CLIN: HCPCS | Performed by: INTERNAL MEDICINE

## 2023-09-06 PROCEDURE — 99214 OFFICE O/P EST MOD 30 MIN: CPT | Performed by: INTERNAL MEDICINE

## 2023-09-06 PROCEDURE — 1036F TOBACCO NON-USER: CPT | Performed by: INTERNAL MEDICINE

## 2023-09-06 RX ORDER — HYDRALAZINE HYDROCHLORIDE 25 MG/1
25 TABLET, FILM COATED ORAL 3 TIMES DAILY
Qty: 270 TABLET | Refills: 3 | Status: SHIPPED | OUTPATIENT
Start: 2023-09-06

## 2023-09-06 ASSESSMENT — ENCOUNTER SYMPTOMS
ABDOMINAL PAIN: 0
COUGH: 0
STRIDOR: 0
APHONIA: 0
NAIL CHANGES: 0
EYE PAIN: 0

## 2023-09-06 NOTE — PROGRESS NOTES
[address] [facility], [address] [facility], [facility], [address]  PHONE: [phone]    SUBJECTIVE:   Sinan Muller is a 77 y.o. female 1945   seen for a follow up visit regarding the following:     Chief Complaint   Patient presents with    Annual Exam    Hypertension         History of present illness: 77 y.o. female presented for follow-up 9/6/23 Playing golf twice a week. Interval HX   She has a previous history of hypertension, as well as spontaneous pneumothorax that was evaluated in 2019. 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 At that time, she was taken off Diltiazem therapy. No recent syncope, presyncope or any other issue. She presented for followup 08/07/2020. She was previously seen by Dr. Rajiv Meyers. Cardiac History:      Spontaneous pneumothorax. 2016 stress perfusion imaging with normal perfusion. 2018 ECG was reviewed with sinus arrest with underlying escape rhythm (taken off Diltiazem at that time). Atrial fibrillation in the patient's medical record, but erroneous diagnosis due to ECG interpreted as atrial fibrillation in 2018, reviewed. History of anticoagulation therapy with Eliquis. Discontinued due to absence of atrial fibrillation and subsequent workup. 2019 echocardiogram - normal left ventricular systolic function, normal left ventricular diastolic function noted. No major valvular disease. 08/07/2020 ECG reviewed, sinus rhythm, low voltage in precordial leads, poor R-wave progression. Sinus  Rhythm Left bundle branch block. ABNORMAL  2022 history of acute kidney injury on Maxide  8/25/2023 EKG sinus rhythm left bundle branch block      Assessment and Plan:    Hypertension  Key CAD CHF Meds            hydrALAZINE (APRESOLINE) 25 MG tablet (Taking)    Sig - Route:  Take 1 tablet by mouth 3 times daily - Oral    ezetimibe (ZETIA) 10 MG tablet (Taking)

## 2023-09-07 NOTE — PROGRESS NOTES
[facility]: Office Visit Established Patient    Reason for follow up:    Macrocytic anemia    Overview: (copied from prior)    Ms. Frandy Borjas is a 65years old female patient with history of hypertension, hyperlipidemia, chronic kidney disease, colon polyps, total abdominal hysterectomy with bilateral salpingo-oophorectomy and spontaneous pneumothorax who has been referred for evaluation of microcytic anemia. On 8/26/202022, she presented to her PCP with complaints of fatigue and shortness of breath with activity. Hemoglobin was down to 8.3 from 11 (in March 2022). On evaluation today, patient reports moderate persistent fatigue and exertional dyspnea with onset a few months ago. She denies fevers, night sweats, chest pain, bloody/black stools, nausea, vomiting, change in appetite, heartburn, headache, gait disturbances, tingling/numbness or weakness in any part of her body. She denies any swollen glands easy bleeding or bruising. She has intentionally lost about 24 pounds over the course of last few months by following healthier eating habits. She is scheduled to undergo EGD and colonoscopy on 10/19/2022. 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 EGD showed gastritis determined by biopsy. Interval history:  Patient returns for follow-up evaluation and review of labs. In last 2 weeks, she has felt more tired. Hemoglobin is 9.7 on labs today. She denies chest pain, shortness of breath, dizziness, nausea, vomiting, black or bloody stools, hematemesis, cough or infectious symptoms. Ferritin is adequate. Review of Systems:  14 point ROS was negative except as per HPI      ECOG PERFORMANCE STATUS - 0-Fully active, able to carry on all pre-disease performance without restriction. Pain - /10. None/Minimal pain - not affecting QOL     Fatigue - No flowsheet data found. Distress - No flowsheet data found.   10/19/2022:  Colonoscopy

## 2023-09-08 ENCOUNTER — HOSPITAL ENCOUNTER (OUTPATIENT)
Dept: INFUSION THERAPY | Age: 78
Discharge: HOME | End: 2023-09-08
Payer: MEDICARE

## 2023-09-08 ENCOUNTER — HOSPITAL ENCOUNTER (OUTPATIENT)
Dept: LAB | Age: 78
End: 2023-09-08
Payer: MEDICARE

## 2023-09-08 ENCOUNTER — OFFICE VISIT (OUTPATIENT)
Dept: ONCOLOGY | Age: 78
End: 2023-09-08
Payer: MEDICARE

## 2023-09-08 VITALS
HEART RATE: 67 BPM | SYSTOLIC BLOOD PRESSURE: 150 MMHG | BODY MASS INDEX: 32.26 KG/M2 | WEIGHT: 175.3 LBS | RESPIRATION RATE: 14 BRPM | DIASTOLIC BLOOD PRESSURE: 71 MMHG | HEIGHT: 62 IN | OXYGEN SATURATION: 98 % | TEMPERATURE: 98 F

## 2023-09-08 DIAGNOSIS — N18.32 CHRONIC KIDNEY DISEASE, STAGE 3B (HCC): Primary | ICD-10-CM

## 2023-09-08 DIAGNOSIS — D50.9 IRON DEFICIENCY ANEMIA, UNSPECIFIED IRON DEFICIENCY ANEMIA TYPE: ICD-10-CM

## 2023-09-08 DIAGNOSIS — D64.9 ANEMIA, UNSPECIFIED TYPE: Primary | ICD-10-CM

## 2023-09-08 DIAGNOSIS — D64.9 ANEMIA, UNSPECIFIED TYPE: ICD-10-CM

## 2023-09-08 LAB
ALBUMIN SERPL-MCNC: 3.6 G/DL (ref 3.2–4.6)
ALBUMIN/GLOB SERPL: 0.9 (ref 0.4–1.6)
ALP SERPL-CCNC: 59 U/L (ref 50–136)
ALT SERPL-CCNC: 15 U/L (ref 12–65)
ANION GAP SERPL CALC-SCNC: 3 MMOL/L (ref 2–11)
AST SERPL-CCNC: 25 U/L (ref 15–37)
BASOPHILS # BLD: 0.1 K/UL (ref 0–0.2)
BASOPHILS NFR BLD: 1 % (ref 0–2)
BILIRUB SERPL-MCNC: 0.4 MG/DL (ref 0.2–1.1)
BUN SERPL-MCNC: 24 MG/DL (ref 8–23)
CALCIUM SERPL-MCNC: 8.8 MG/DL (ref 8.3–10.4)
CHLORIDE SERPL-SCNC: 109 MMOL/L (ref 101–110)
CO2 SERPL-SCNC: 28 MMOL/L (ref 21–32)
CREAT SERPL-MCNC: 1.6 MG/DL (ref 0.6–1)
DIFFERENTIAL METHOD BLD: ABNORMAL
EOSINOPHIL # BLD: 0.1 K/UL (ref 0–0.8)
EOSINOPHIL NFR BLD: 2 % (ref 0.5–7.8)
ERYTHROCYTE [DISTWIDTH] IN BLOOD BY AUTOMATED COUNT: 13.7 % (ref 11.9–14.6)
FERRITIN SERPL-MCNC: 369 NG/ML (ref 8–388)
GLOBULIN SER CALC-MCNC: 4.2 G/DL (ref 2.8–4.5)
GLUCOSE SERPL-MCNC: 82 MG/DL (ref 65–100)
HCT VFR BLD AUTO: 30.5 % (ref 35.8–46.3)
HGB BLD-MCNC: 9.7 G/DL (ref 11.7–15.4)
IMM GRANULOCYTES # BLD AUTO: 0 K/UL (ref 0–0.5)
IMM GRANULOCYTES NFR BLD AUTO: 1 % (ref 0–5)
IRON SATN MFR SERPL: 37 %
IRON SERPL-MCNC: 96 UG/DL (ref 35–150)
LYMPHOCYTES # BLD: 1.6 K/UL (ref 0.5–4.6)
LYMPHOCYTES NFR BLD: 37 % (ref 13–44)
MCH RBC QN AUTO: 32.1 PG (ref 26.1–32.9)
MCHC RBC AUTO-ENTMCNC: 31.8 G/DL (ref 31.4–35)
MCV RBC AUTO: 101 FL (ref 82–102)
MONOCYTES # BLD: 0.3 K/UL (ref 0.1–1.3)
MONOCYTES NFR BLD: 7 % (ref 4–12)
NEUTS SEG # BLD: 2.4 K/UL (ref 1.7–8.2)
NEUTS SEG NFR BLD: 53 % (ref 43–78)
NRBC # BLD: 0 K/UL (ref 0–0.2)
PLATELET # BLD AUTO: 164 K/UL (ref 150–450)
PMV BLD AUTO: 9.8 FL (ref 9.4–12.3)
POTASSIUM SERPL-SCNC: 4.2 MMOL/L (ref 3.5–5.1)
PROT SERPL-MCNC: 7.8 G/DL (ref 6.3–8.2)
RBC # BLD AUTO: 3.02 M/UL (ref 4.05–5.2)
SODIUM SERPL-SCNC: 140 MMOL/L (ref 133–143)
TIBC SERPL-MCNC: 258 UG/DL (ref 250–450)
WBC # BLD AUTO: 4.5 K/UL (ref 4.3–11.1)

## 2023-09-08 PROCEDURE — 83540 ASSAY OF IRON: CPT

## 2023-09-08 PROCEDURE — G8399 PT W/DXA RESULTS DOCUMENT: HCPCS | Performed by: INTERNAL MEDICINE

## 2023-09-08 PROCEDURE — 6360000002 HC RX W HCPCS: Performed by: INTERNAL MEDICINE

## 2023-09-08 PROCEDURE — 3077F SYST BP >= 140 MM HG: CPT | Performed by: INTERNAL MEDICINE

## 2023-09-08 PROCEDURE — 80053 COMPREHEN METABOLIC PANEL: CPT

## 2023-09-08 PROCEDURE — 96372 THER/PROPH/DIAG INJ SC/IM: CPT

## 2023-09-08 PROCEDURE — 85025 COMPLETE CBC W/AUTO DIFF WBC: CPT

## 2023-09-08 PROCEDURE — 82728 ASSAY OF FERRITIN: CPT

## 2023-09-08 PROCEDURE — 83550 IRON BINDING TEST: CPT

## 2023-09-08 PROCEDURE — G8417 CALC BMI ABV UP PARAM F/U: HCPCS | Performed by: INTERNAL MEDICINE

## 2023-09-08 PROCEDURE — G8427 DOCREV CUR MEDS BY ELIG CLIN: HCPCS | Performed by: INTERNAL MEDICINE

## 2023-09-08 PROCEDURE — 1090F PRES/ABSN URINE INCON ASSESS: CPT | Performed by: INTERNAL MEDICINE

## 2023-09-08 PROCEDURE — 1123F ACP DISCUSS/DSCN MKR DOCD: CPT | Performed by: INTERNAL MEDICINE

## 2023-09-08 PROCEDURE — 99213 OFFICE O/P EST LOW 20 MIN: CPT | Performed by: INTERNAL MEDICINE

## 2023-09-08 PROCEDURE — 36415 COLL VENOUS BLD VENIPUNCTURE: CPT

## 2023-09-08 PROCEDURE — 1036F TOBACCO NON-USER: CPT | Performed by: INTERNAL MEDICINE

## 2023-09-08 PROCEDURE — 3078F DIAST BP <80 MM HG: CPT | Performed by: INTERNAL MEDICINE

## 2023-09-08 RX ORDER — EPINEPHRINE 1 MG/ML
0.3 INJECTION, SOLUTION, CONCENTRATE INTRAVENOUS PRN
Status: CANCELLED | OUTPATIENT
Start: 2023-09-08

## 2023-09-08 RX ORDER — FAMOTIDINE 10 MG/ML
20 INJECTION, SOLUTION INTRAVENOUS
OUTPATIENT
Start: 2023-09-08

## 2023-09-08 RX ORDER — ONDANSETRON 2 MG/ML
8 INJECTION INTRAMUSCULAR; INTRAVENOUS
OUTPATIENT
Start: 2023-09-08

## 2023-09-08 RX ORDER — ONDANSETRON 2 MG/ML
4 INJECTION INTRAMUSCULAR; INTRAVENOUS ONCE
Status: CANCELLED
Start: 2023-09-08 | End: 2023-09-08

## 2023-09-08 RX ORDER — EPINEPHRINE 1 MG/ML
0.3 INJECTION, SOLUTION, CONCENTRATE INTRAVENOUS PRN
OUTPATIENT
Start: 2023-09-08

## 2023-09-08 RX ORDER — ACETAMINOPHEN 325 MG/1
650 TABLET ORAL
OUTPATIENT
Start: 2023-09-08

## 2023-09-08 RX ORDER — ALBUTEROL SULFATE 90 UG/1
4 AEROSOL, METERED RESPIRATORY (INHALATION) PRN
Status: CANCELLED | OUTPATIENT
Start: 2023-09-08

## 2023-09-08 RX ORDER — DIPHENHYDRAMINE HYDROCHLORIDE 50 MG/ML
50 INJECTION INTRAMUSCULAR; INTRAVENOUS
OUTPATIENT
Start: 2023-09-08

## 2023-09-08 RX ORDER — ONDANSETRON 2 MG/ML
8 INJECTION INTRAMUSCULAR; INTRAVENOUS
Status: CANCELLED | OUTPATIENT
Start: 2023-09-08

## 2023-09-08 RX ORDER — FAMOTIDINE 10 MG/ML
20 INJECTION, SOLUTION INTRAVENOUS
Status: CANCELLED | OUTPATIENT
Start: 2023-09-08

## 2023-09-08 RX ORDER — ACETAMINOPHEN 325 MG/1
650 TABLET ORAL
Status: CANCELLED | OUTPATIENT
Start: 2023-09-08

## 2023-09-08 RX ORDER — ONDANSETRON 2 MG/ML
4 INJECTION INTRAMUSCULAR; INTRAVENOUS ONCE
Status: COMPLETED | OUTPATIENT
Start: 2023-09-08 | End: 2023-09-08

## 2023-09-08 RX ORDER — SODIUM CHLORIDE 9 MG/ML
INJECTION, SOLUTION INTRAVENOUS CONTINUOUS
OUTPATIENT
Start: 2023-09-08

## 2023-09-08 RX ORDER — DIPHENHYDRAMINE HYDROCHLORIDE 50 MG/ML
50 INJECTION INTRAMUSCULAR; INTRAVENOUS
Status: CANCELLED | OUTPATIENT
Start: 2023-09-08

## 2023-09-08 RX ORDER — ONDANSETRON 2 MG/ML
4 INJECTION INTRAMUSCULAR; INTRAVENOUS ONCE
Start: 2023-09-08 | End: 2023-09-08

## 2023-09-08 RX ORDER — ALBUTEROL SULFATE 90 UG/1
4 AEROSOL, METERED RESPIRATORY (INHALATION) PRN
OUTPATIENT
Start: 2023-09-08

## 2023-09-08 RX ORDER — SODIUM CHLORIDE 9 MG/ML
INJECTION, SOLUTION INTRAVENOUS CONTINUOUS
Status: CANCELLED | OUTPATIENT
Start: 2023-09-08

## 2023-09-08 RX ADMIN — ONDANSETRON 4 MG: 2 INJECTION INTRAMUSCULAR; INTRAVENOUS at 13:49

## 2023-09-08 RX ADMIN — EPOETIN ALFA-EPBX 40000 UNITS: 40000 INJECTION, SOLUTION INTRAVENOUS; SUBCUTANEOUS at 13:47

## 2023-09-08 ASSESSMENT — PATIENT HEALTH QUESTIONNAIRE - PHQ9
SUM OF ALL RESPONSES TO PHQ QUESTIONS 1-9: 0
SUM OF ALL RESPONSES TO PHQ QUESTIONS 1-9: 0
SUM OF ALL RESPONSES TO PHQ9 QUESTIONS 1 & 2: 0
2. FEELING DOWN, DEPRESSED OR HOPELESS: 0
1. LITTLE INTEREST OR PLEASURE IN DOING THINGS: 0
SUM OF ALL RESPONSES TO PHQ QUESTIONS 1-9: 0
SUM OF ALL RESPONSES TO PHQ QUESTIONS 1-9: 0

## 2023-09-08 NOTE — PATIENT INSTRUCTIONS
258  250 - 450 ug/dL Final    TRANSFERRIN SATURATION 09/08/2023 37  >20 % Final         Treatment Summary has been discussed and given to patient: N/A        -------------------------------------------------------------------------------------------------------------------  Please call our office at [phone] if you have any  of the following symptoms:   Fever of 100.5 or greater  Chills  Shortness of breath  Swelling or pain in one leg    After office hours an answering service is available and will contact a provider for emergencies or if you are experiencing any of the above symptoms. Patient does express an interest in My Chart. My Chart log in information explained on the after visit summary printout at the [address] desk.     Kira Callejas RN

## 2023-09-08 NOTE — PROGRESS NOTES
Patient arrived to infusion. Retacrit & Zofran adminsitered. Patient tolerated well. Discharged ambulatory. Patient aware of next appt on 10/20.

## 2023-10-19 DIAGNOSIS — D50.9 IRON DEFICIENCY ANEMIA, UNSPECIFIED IRON DEFICIENCY ANEMIA TYPE: ICD-10-CM

## 2023-10-19 DIAGNOSIS — D64.9 ANEMIA, UNSPECIFIED TYPE: Primary | ICD-10-CM

## 2023-10-20 ENCOUNTER — HOSPITAL ENCOUNTER (OUTPATIENT)
Dept: LAB | Age: 78
End: 2023-10-20
Payer: MEDICARE

## 2023-10-20 ENCOUNTER — HOSPITAL ENCOUNTER (OUTPATIENT)
Dept: INFUSION THERAPY | Age: 78
Discharge: HOME | End: 2023-10-20

## 2023-10-20 ENCOUNTER — OFFICE VISIT (OUTPATIENT)
Dept: ONCOLOGY | Age: 78
End: 2023-10-20
Payer: MEDICARE

## 2023-10-20 VITALS
TEMPERATURE: 97.4 F | DIASTOLIC BLOOD PRESSURE: 64 MMHG | WEIGHT: 179.4 LBS | SYSTOLIC BLOOD PRESSURE: 135 MMHG | BODY MASS INDEX: 32.81 KG/M2 | HEART RATE: 71 BPM | OXYGEN SATURATION: 97 % | RESPIRATION RATE: 18 BRPM

## 2023-10-20 DIAGNOSIS — D64.9 ANEMIA, UNSPECIFIED TYPE: Primary | ICD-10-CM

## 2023-10-20 DIAGNOSIS — D64.9 ANEMIA, UNSPECIFIED TYPE: ICD-10-CM

## 2023-10-20 DIAGNOSIS — D50.9 IRON DEFICIENCY ANEMIA, UNSPECIFIED IRON DEFICIENCY ANEMIA TYPE: ICD-10-CM

## 2023-10-20 LAB
ALBUMIN SERPL-MCNC: 3.5 G/DL (ref 3.2–4.6)
ALBUMIN/GLOB SERPL: 0.8 (ref 0.4–1.6)
ALP SERPL-CCNC: 67 U/L (ref 50–136)
ALT SERPL-CCNC: 14 U/L (ref 12–65)
ANION GAP SERPL CALC-SCNC: 5 MMOL/L (ref 2–11)
AST SERPL-CCNC: 28 U/L (ref 15–37)
BASOPHILS # BLD: 0.1 K/UL (ref 0–0.2)
BASOPHILS NFR BLD: 1 % (ref 0–2)
BILIRUB SERPL-MCNC: 0.3 MG/DL (ref 0.2–1.1)
BUN SERPL-MCNC: 29 MG/DL (ref 8–23)
CALCIUM SERPL-MCNC: 9.2 MG/DL (ref 8.3–10.4)
CHLORIDE SERPL-SCNC: 110 MMOL/L (ref 101–110)
CO2 SERPL-SCNC: 27 MMOL/L (ref 21–32)
CREAT SERPL-MCNC: 1.8 MG/DL (ref 0.6–1)
DIFFERENTIAL METHOD BLD: ABNORMAL
EOSINOPHIL # BLD: 0.2 K/UL (ref 0–0.8)
EOSINOPHIL NFR BLD: 4 % (ref 0.5–7.8)
ERYTHROCYTE [DISTWIDTH] IN BLOOD BY AUTOMATED COUNT: 14 % (ref 11.9–14.6)
FERRITIN SERPL-MCNC: 356 NG/ML (ref 8–388)
GLOBULIN SER CALC-MCNC: 4.6 G/DL (ref 2.8–4.5)
GLUCOSE SERPL-MCNC: 81 MG/DL (ref 65–100)
HCT VFR BLD AUTO: 30.9 % (ref 35.8–46.3)
HGB BLD-MCNC: 10.1 G/DL (ref 11.7–15.4)
IMM GRANULOCYTES # BLD AUTO: 0 K/UL (ref 0–0.5)
IMM GRANULOCYTES NFR BLD AUTO: 0 % (ref 0–5)
IRON SATN MFR SERPL: 24 %
IRON SERPL-MCNC: 62 UG/DL (ref 35–150)
LYMPHOCYTES # BLD: 1.8 K/UL (ref 0.5–4.6)
LYMPHOCYTES NFR BLD: 37 % (ref 13–44)
MCH RBC QN AUTO: 32.8 PG (ref 26.1–32.9)
MCHC RBC AUTO-ENTMCNC: 32.7 G/DL (ref 31.4–35)
MCV RBC AUTO: 100.3 FL (ref 82–102)
MONOCYTES # BLD: 0.3 K/UL (ref 0.1–1.3)
MONOCYTES NFR BLD: 6 % (ref 4–12)
NEUTS SEG # BLD: 2.5 K/UL (ref 1.7–8.2)
NEUTS SEG NFR BLD: 52 % (ref 43–78)
NRBC # BLD: 0 K/UL (ref 0–0.2)
PLATELET # BLD AUTO: 164 K/UL (ref 150–450)
PMV BLD AUTO: 9.6 FL (ref 9.4–12.3)
POTASSIUM SERPL-SCNC: 3.8 MMOL/L (ref 3.5–5.1)
PROT SERPL-MCNC: 8.1 G/DL (ref 6.3–8.2)
RBC # BLD AUTO: 3.08 M/UL (ref 4.05–5.2)
SODIUM SERPL-SCNC: 142 MMOL/L (ref 133–143)
TIBC SERPL-MCNC: 261 UG/DL (ref 250–450)
WBC # BLD AUTO: 4.9 K/UL (ref 4.3–11.1)

## 2023-10-20 PROCEDURE — G8484 FLU IMMUNIZE NO ADMIN: HCPCS | Performed by: NURSE PRACTITIONER

## 2023-10-20 PROCEDURE — 83540 ASSAY OF IRON: CPT

## 2023-10-20 PROCEDURE — 83550 IRON BINDING TEST: CPT

## 2023-10-20 PROCEDURE — 80053 COMPREHEN METABOLIC PANEL: CPT

## 2023-10-20 PROCEDURE — 1090F PRES/ABSN URINE INCON ASSESS: CPT | Performed by: NURSE PRACTITIONER

## 2023-10-20 PROCEDURE — G8427 DOCREV CUR MEDS BY ELIG CLIN: HCPCS | Performed by: NURSE PRACTITIONER

## 2023-10-20 PROCEDURE — G8399 PT W/DXA RESULTS DOCUMENT: HCPCS | Performed by: NURSE PRACTITIONER

## 2023-10-20 PROCEDURE — 1123F ACP DISCUSS/DSCN MKR DOCD: CPT | Performed by: NURSE PRACTITIONER

## 2023-10-20 PROCEDURE — 3078F DIAST BP <80 MM HG: CPT | Performed by: NURSE PRACTITIONER

## 2023-10-20 PROCEDURE — 36415 COLL VENOUS BLD VENIPUNCTURE: CPT

## 2023-10-20 PROCEDURE — 1036F TOBACCO NON-USER: CPT | Performed by: NURSE PRACTITIONER

## 2023-10-20 PROCEDURE — G8417 CALC BMI ABV UP PARAM F/U: HCPCS | Performed by: NURSE PRACTITIONER

## 2023-10-20 PROCEDURE — 3075F SYST BP GE 130 - 139MM HG: CPT | Performed by: NURSE PRACTITIONER

## 2023-10-20 PROCEDURE — 99214 OFFICE O/P EST MOD 30 MIN: CPT | Performed by: NURSE PRACTITIONER

## 2023-10-20 PROCEDURE — 85025 COMPLETE CBC W/AUTO DIFF WBC: CPT

## 2023-10-20 PROCEDURE — 82728 ASSAY OF FERRITIN: CPT

## 2023-10-20 ASSESSMENT — PATIENT HEALTH QUESTIONNAIRE - PHQ9
2. FEELING DOWN, DEPRESSED OR HOPELESS: 0
SUM OF ALL RESPONSES TO PHQ QUESTIONS 1-9: 0
1. LITTLE INTEREST OR PLEASURE IN DOING THINGS: 0
SUM OF ALL RESPONSES TO PHQ QUESTIONS 1-9: 0
SUM OF ALL RESPONSES TO PHQ QUESTIONS 1-9: 0
SUM OF ALL RESPONSES TO PHQ9 QUESTIONS 1 & 2: 0
SUM OF ALL RESPONSES TO PHQ QUESTIONS 1-9: 0

## 2023-10-20 NOTE — PROGRESS NOTES
Normal female karyotype. FLUORESCENCE IN SITU HYBRIDIZATION:  Normal results. MOLECULAR GENETIC STUDIES:          Myeloid Disorders Profile:  No pathogenic mutations are detected. COMMENT:  The bone marrow is normocellular for age with no definitive   evidence of a bone marrow disorder. In the absence of cytogenetics or   molecular abnormalities, the diagnosis of MDS is less likely. Imaging:  No results found. Problems:  Normocytic anemia, bm bx : No definitive morphologic evidence of a bone marrow disorder     2. Stage 3 chronic kidney disease, unspecified whether stage 3a or 3b CKD ([address])    3. Labs are suggestive of iron deficiency despite taking daily oral iron. 4. Other general symptoms and signs     5. Macrocytosis    History of colonic polyps. Folate deficiency  Hypertension  Hyperlipidemia  GERD     Leukocytes and Plt preserved. PLAN:  Bone marrow biopsy: No definitive morphologic evidence of a bone marrow disorder    c/w folic acid. Proceed with IV iron as planned. Recent EGD and colonoscopy did not reveal any active source of bleeding. Capsule endoscopy may be considered for further work-up. -Based on my evaluation, patient is felt to have anemia of chronic disease with a component of iron deficiency. - On Retacrit 40,000 every 6 weeks for Hgb < 10    10/20/2023:  She is here today for follow up. She has been very well overall. She does have mild fatigue but takes naps when she needs to. She denies any shortness of breath. She has been eating decently well with a good appetite. She denies any fevers or bleeding. Labs reviewed and Hgb 10.1, will hold Retacrit today as above set parameter and she will come back in 6 weeks and see Dr. Howard Collazo as scheduled.           RAYMOND Ochoa - ILSA  [facility]  [address] [facility], [address] [place] [address]  Office : [phone]  Fax : [phone]

## 2023-11-21 DIAGNOSIS — D64.9 ANEMIA, UNSPECIFIED TYPE: Primary | ICD-10-CM

## 2023-11-30 NOTE — PROGRESS NOTES
[facility]ematology and Oncology: Office Visit Established Patient    Reason for follow up:    Macrocytic anemia    Overview: (copied from prior)    Ms. Ariel Ordonez is a 65years old female patient with history of hypertension, hyperlipidemia, chronic kidney disease, colon polyps, total abdominal hysterectomy with bilateral salpingo-oophorectomy and spontaneous pneumothorax who has been referred for evaluation of microcytic anemia. On 8/26/202022, she presented to her PCP with complaints of fatigue and shortness of breath with activity. Hemoglobin was down to 8.3 from 11 (in March 2022). On evaluation today, patient reports moderate persistent fatigue and exertional dyspnea with onset a few months ago. She denies fevers, night sweats, chest pain, bloody/black stools, nausea, vomiting, change in appetite, heartburn, headache, gait disturbances, tingling/numbness or weakness in any part of her body. She denies any swollen glands easy bleeding or bruising. She has intentionally lost about 24 pounds over the course of last few months by following healthier eating habits. She is scheduled to undergo EGD and colonoscopy on 10/19/2022. 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 EGD showed gastritis determined by biopsy. Interval history:  Patient returns for follow-up evaluation and consideration of next dose of Retacrit. Over last 2-3 weeks, her fatigue has progressively worsened and she wishes to return to monthly Retacrit injections. She denies any shortness of breath. She has been eating decently well with a good appetite. She denies any fevers or bleeding. Review of Systems:  14 point ROS was negative except as per HPI      ECOG PERFORMANCE STATUS - 0-Fully active, able to carry on all pre-disease performance without restriction. Pain - Pain Score:   0 - No pain (Fatigue-8)/10.  None/Minimal pain - not affecting QOL     Fatigue - No flowsheet data

## 2023-12-01 ENCOUNTER — HOSPITAL ENCOUNTER (OUTPATIENT)
Dept: LAB | Age: 78
Discharge: HOME | End: 2023-12-01
Payer: COMMERCIAL

## 2023-12-01 ENCOUNTER — OFFICE VISIT (OUTPATIENT)
Dept: ONCOLOGY | Age: 78
End: 2023-12-01
Payer: MEDICARE

## 2023-12-01 ENCOUNTER — HOSPITAL ENCOUNTER (OUTPATIENT)
Dept: INFUSION THERAPY | Age: 78
Discharge: HOME | End: 2023-12-01
Payer: MEDICARE

## 2023-12-01 VITALS
HEART RATE: 64 BPM | SYSTOLIC BLOOD PRESSURE: 134 MMHG | TEMPERATURE: 98.1 F | RESPIRATION RATE: 18 BRPM | BODY MASS INDEX: 32.3 KG/M2 | WEIGHT: 175.5 LBS | DIASTOLIC BLOOD PRESSURE: 54 MMHG | OXYGEN SATURATION: 97 % | HEIGHT: 62 IN

## 2023-12-01 DIAGNOSIS — D64.9 ANEMIA, UNSPECIFIED TYPE: Primary | ICD-10-CM

## 2023-12-01 DIAGNOSIS — D64.9 ANEMIA, UNSPECIFIED TYPE: ICD-10-CM

## 2023-12-01 DIAGNOSIS — N18.32 CHRONIC KIDNEY DISEASE, STAGE 3B (HCC): Primary | ICD-10-CM

## 2023-12-01 LAB
ALBUMIN SERPL-MCNC: 3.6 G/DL (ref 3.2–4.6)
ALBUMIN/GLOB SERPL: 0.8 (ref 0.4–1.6)
ALP SERPL-CCNC: 62 U/L (ref 50–136)
ALT SERPL-CCNC: 12 U/L (ref 12–65)
ANION GAP SERPL CALC-SCNC: 6 MMOL/L (ref 2–11)
AST SERPL-CCNC: 22 U/L (ref 15–37)
BASOPHILS # BLD: 0.1 K/UL (ref 0–0.2)
BASOPHILS NFR BLD: 2 % (ref 0–2)
BILIRUB SERPL-MCNC: 0.3 MG/DL (ref 0.2–1.1)
BUN SERPL-MCNC: 24 MG/DL (ref 8–23)
CALCIUM SERPL-MCNC: 8.9 MG/DL (ref 8.3–10.4)
CHLORIDE SERPL-SCNC: 110 MMOL/L (ref 101–110)
CO2 SERPL-SCNC: 22 MMOL/L (ref 21–32)
CREAT SERPL-MCNC: 1.6 MG/DL (ref 0.6–1)
DIFFERENTIAL METHOD BLD: ABNORMAL
EOSINOPHIL # BLD: 0.1 K/UL (ref 0–0.8)
EOSINOPHIL NFR BLD: 3 % (ref 0.5–7.8)
ERYTHROCYTE [DISTWIDTH] IN BLOOD BY AUTOMATED COUNT: 14.9 % (ref 11.9–14.6)
GLOBULIN SER CALC-MCNC: 4.5 G/DL (ref 2.8–4.5)
GLUCOSE SERPL-MCNC: 76 MG/DL (ref 65–100)
HCT VFR BLD AUTO: 30.6 % (ref 35.8–46.3)
HGB BLD-MCNC: 9.8 G/DL (ref 11.7–15.4)
IMM GRANULOCYTES # BLD AUTO: 0 K/UL (ref 0–0.5)
IMM GRANULOCYTES NFR BLD AUTO: 1 % (ref 0–5)
LYMPHOCYTES # BLD: 1.6 K/UL (ref 0.5–4.6)
LYMPHOCYTES NFR BLD: 37 % (ref 13–44)
MCH RBC QN AUTO: 32.5 PG (ref 26.1–32.9)
MCHC RBC AUTO-ENTMCNC: 32 G/DL (ref 31.4–35)
MCV RBC AUTO: 101.3 FL (ref 82–102)
MONOCYTES # BLD: 0.3 K/UL (ref 0.1–1.3)
MONOCYTES NFR BLD: 6 % (ref 4–12)
NEUTS SEG # BLD: 2.1 K/UL (ref 1.7–8.2)
NEUTS SEG NFR BLD: 51 % (ref 43–78)
NRBC # BLD: 0 K/UL (ref 0–0.2)
PLATELET # BLD AUTO: 178 K/UL (ref 150–450)
PMV BLD AUTO: 9.9 FL (ref 9.4–12.3)
POTASSIUM SERPL-SCNC: 3.7 MMOL/L (ref 3.5–5.1)
PROT SERPL-MCNC: 8.1 G/DL (ref 6.3–8.2)
RBC # BLD AUTO: 3.02 M/UL (ref 4.05–5.2)
SODIUM SERPL-SCNC: 138 MMOL/L (ref 133–143)
WBC # BLD AUTO: 4.1 K/UL (ref 4.3–11.1)

## 2023-12-01 PROCEDURE — 99213 OFFICE O/P EST LOW 20 MIN: CPT | Performed by: INTERNAL MEDICINE

## 2023-12-01 PROCEDURE — 6360000002 HC RX W HCPCS: Performed by: INTERNAL MEDICINE

## 2023-12-01 PROCEDURE — 80053 COMPREHEN METABOLIC PANEL: CPT

## 2023-12-01 PROCEDURE — G8399 PT W/DXA RESULTS DOCUMENT: HCPCS | Performed by: INTERNAL MEDICINE

## 2023-12-01 PROCEDURE — G8484 FLU IMMUNIZE NO ADMIN: HCPCS | Performed by: INTERNAL MEDICINE

## 2023-12-01 PROCEDURE — G8427 DOCREV CUR MEDS BY ELIG CLIN: HCPCS | Performed by: INTERNAL MEDICINE

## 2023-12-01 PROCEDURE — 3075F SYST BP GE 130 - 139MM HG: CPT | Performed by: INTERNAL MEDICINE

## 2023-12-01 PROCEDURE — 85025 COMPLETE CBC W/AUTO DIFF WBC: CPT

## 2023-12-01 PROCEDURE — 1123F ACP DISCUSS/DSCN MKR DOCD: CPT | Performed by: INTERNAL MEDICINE

## 2023-12-01 PROCEDURE — 96372 THER/PROPH/DIAG INJ SC/IM: CPT

## 2023-12-01 PROCEDURE — G8417 CALC BMI ABV UP PARAM F/U: HCPCS | Performed by: INTERNAL MEDICINE

## 2023-12-01 PROCEDURE — 36415 COLL VENOUS BLD VENIPUNCTURE: CPT

## 2023-12-01 PROCEDURE — 1090F PRES/ABSN URINE INCON ASSESS: CPT | Performed by: INTERNAL MEDICINE

## 2023-12-01 PROCEDURE — 1036F TOBACCO NON-USER: CPT | Performed by: INTERNAL MEDICINE

## 2023-12-01 PROCEDURE — 3078F DIAST BP <80 MM HG: CPT | Performed by: INTERNAL MEDICINE

## 2023-12-01 RX ORDER — ONDANSETRON 2 MG/ML
4 INJECTION INTRAMUSCULAR; INTRAVENOUS ONCE
Status: COMPLETED | OUTPATIENT
Start: 2023-12-01 | End: 2023-12-01

## 2023-12-01 RX ORDER — SODIUM CHLORIDE 9 MG/ML
INJECTION, SOLUTION INTRAVENOUS CONTINUOUS
OUTPATIENT
Start: 2023-12-01

## 2023-12-01 RX ORDER — ONDANSETRON 2 MG/ML
8 INJECTION INTRAMUSCULAR; INTRAVENOUS
OUTPATIENT
Start: 2023-12-01

## 2023-12-01 RX ORDER — ONDANSETRON 2 MG/ML
4 INJECTION INTRAMUSCULAR; INTRAVENOUS ONCE
Start: 2023-12-01 | End: 2023-12-01

## 2023-12-01 RX ORDER — EPINEPHRINE 1 MG/ML
0.3 INJECTION, SOLUTION, CONCENTRATE INTRAVENOUS PRN
OUTPATIENT
Start: 2023-12-01

## 2023-12-01 RX ORDER — ALBUTEROL SULFATE 90 UG/1
4 AEROSOL, METERED RESPIRATORY (INHALATION) PRN
OUTPATIENT
Start: 2023-12-01

## 2023-12-01 RX ORDER — DIPHENHYDRAMINE HYDROCHLORIDE 50 MG/ML
50 INJECTION INTRAMUSCULAR; INTRAVENOUS
OUTPATIENT
Start: 2023-12-01

## 2023-12-01 RX ORDER — ACETAMINOPHEN 325 MG/1
650 TABLET ORAL
OUTPATIENT
Start: 2023-12-01

## 2023-12-01 RX ADMIN — EPOETIN ALFA-EPBX 40000 UNITS: 40000 INJECTION, SOLUTION INTRAVENOUS; SUBCUTANEOUS at 09:37

## 2023-12-01 RX ADMIN — ONDANSETRON 4 MG: 2 INJECTION INTRAMUSCULAR; INTRAVENOUS at 09:37

## 2023-12-01 ASSESSMENT — PATIENT HEALTH QUESTIONNAIRE - PHQ9
SUM OF ALL RESPONSES TO PHQ QUESTIONS 1-9: 2
SUM OF ALL RESPONSES TO PHQ QUESTIONS 1-9: 2
2. FEELING DOWN, DEPRESSED OR HOPELESS: 1
SUM OF ALL RESPONSES TO PHQ QUESTIONS 1-9: 2
SUM OF ALL RESPONSES TO PHQ QUESTIONS 1-9: 2
1. LITTLE INTEREST OR PLEASURE IN DOING THINGS: 1
SUM OF ALL RESPONSES TO PHQ9 QUESTIONS 1 & 2: 2

## 2023-12-01 NOTE — PROGRESS NOTES
Patient arrived to infusion. Retacrit and zofran completed. Patient tolerated without difficulty. Discharged ambulatory. Patient aware of next appt on 12/29.

## 2023-12-01 NOTE — PATIENT INSTRUCTIONS
Patient Instructions from Today's Visit    Reason for Visit:  Follow up - Anemia     Diagnosis Information:  https://www.[employer]/. net/about-us/asco-answers-patient-education-materials/bbyx-kjmowkt-znaf-sheets     Plan:  Proceed to infusion today for Retacrit  Due to kidney function, you should avoid NSAIDs, aleve, and ibuprofen. We will only check CBCs moving forward - we only need to check CBC to monitor your hemoglobin. Continue routine follow up with PCP and they can monitor your kidney and liver function moving forward. Please call us if you have any questions or concerns before your next visit.      Follow Up:  4 weeks with labs  Infusion after office visit for Retacrit    Recent Lab Results:  Hospital Outpatient Visit on 12/01/2023   Component Date Value Ref Range Status    WBC 12/01/2023 4.1 (L)  4.3 - 11.1 K/uL Final    RBC 12/01/2023 3.02 (L)  4.05 - 5.2 M/uL Final    Hemoglobin 12/01/2023 9.8 (L)  11.7 - 15.4 g/dL Final    Hematocrit 12/01/2023 30.6 (L)  35.8 - 46.3 % Final    MCV 12/01/2023 101.3  82.0 - 102.0 FL Final    MCH 12/01/2023 32.5  26.1 - 32.9 PG Final    MCHC 12/01/2023 32.0  31.4 - 35.0 g/dL Final    RDW 12/01/2023 14.9 (H)  11.9 - 14.6 % Final    Platelets 69/72/5073 178  150 - 450 K/uL Final    MPV 12/01/2023 9.9  9.4 - 12.3 FL Final    nRBC 12/01/2023 0.00  0.0 - 0.2 K/uL Final    **Note: Absolute NRBC parameter is now reported with Hemogram**    Neutrophils % 12/01/2023 51  43 - 78 % Final    Lymphocytes % 12/01/2023 37  13 - 44 % Final    Monocytes % 12/01/2023 6  4.0 - 12.0 % Final    Eosinophils % 12/01/2023 3  0.5 - 7.8 % Final    Basophils % 12/01/2023 2  0.0 - 2.0 % Final    Immature Granulocytes 12/01/2023 1  0.0 - 5.0 % Final    Neutrophils Absolute 12/01/2023 2.1  1.7 - 8.2 K/UL Final    Lymphocytes Absolute 12/01/2023 1.6  0.5 - 4.6 K/UL Final    Monocytes Absolute 12/01/2023 0.3  0.1 - 1.3 K/UL Final    Eosinophils Absolute 12/01/2023 0.1  0.0 - 0.8 K/UL Final    Basophils

## 2023-12-29 ENCOUNTER — HOSPITAL ENCOUNTER (OUTPATIENT)
Dept: LAB | Age: 78
End: 2023-12-29
Payer: MEDICARE

## 2023-12-29 ENCOUNTER — HOSPITAL ENCOUNTER (OUTPATIENT)
Dept: INFUSION THERAPY | Age: 78
Discharge: HOME | End: 2023-12-29

## 2023-12-29 VITALS
HEART RATE: 46 BPM | TEMPERATURE: 97.3 F | OXYGEN SATURATION: 94 % | SYSTOLIC BLOOD PRESSURE: 108 MMHG | DIASTOLIC BLOOD PRESSURE: 64 MMHG | RESPIRATION RATE: 18 BRPM

## 2023-12-29 DIAGNOSIS — D64.9 ANEMIA, UNSPECIFIED TYPE: ICD-10-CM

## 2023-12-29 LAB
BASOPHILS # BLD: 0.1 K/UL (ref 0–0.2)
BASOPHILS NFR BLD: 1 % (ref 0–2)
DIFFERENTIAL METHOD BLD: ABNORMAL
EOSINOPHIL # BLD: 0.1 K/UL (ref 0–0.8)
EOSINOPHIL NFR BLD: 2 % (ref 0.5–7.8)
ERYTHROCYTE [DISTWIDTH] IN BLOOD BY AUTOMATED COUNT: 14.7 % (ref 11.9–14.6)
HCT VFR BLD AUTO: 34.5 % (ref 35.8–46.3)
HGB BLD-MCNC: 11 G/DL (ref 11.7–15.4)
IMM GRANULOCYTES # BLD AUTO: 0.1 K/UL (ref 0–0.5)
IMM GRANULOCYTES NFR BLD AUTO: 1 % (ref 0–5)
LYMPHOCYTES # BLD: 1.8 K/UL (ref 0.5–4.6)
LYMPHOCYTES NFR BLD: 39 % (ref 13–44)
MCH RBC QN AUTO: 32.6 PG (ref 26.1–32.9)
MCHC RBC AUTO-ENTMCNC: 31.9 G/DL (ref 31.4–35)
MCV RBC AUTO: 102.4 FL (ref 82–102)
MONOCYTES # BLD: 0.2 K/UL (ref 0.1–1.3)
MONOCYTES NFR BLD: 4 % (ref 4–12)
NEUTS SEG # BLD: 2.5 K/UL (ref 1.7–8.2)
NEUTS SEG NFR BLD: 53 % (ref 43–78)
NRBC # BLD: 0 K/UL (ref 0–0.2)
PLATELET # BLD AUTO: 253 K/UL (ref 150–450)
PMV BLD AUTO: 9.5 FL (ref 9.4–12.3)
RBC # BLD AUTO: 3.37 M/UL (ref 4.05–5.2)
WBC # BLD AUTO: 4.8 K/UL (ref 4.3–11.1)

## 2023-12-29 PROCEDURE — 36415 COLL VENOUS BLD VENIPUNCTURE: CPT

## 2023-12-29 PROCEDURE — 85025 COMPLETE CBC W/AUTO DIFF WBC: CPT

## 2024-01-22 DIAGNOSIS — D64.9 ANEMIA, UNSPECIFIED TYPE: Primary | ICD-10-CM

## 2024-01-26 ENCOUNTER — HOSPITAL ENCOUNTER (OUTPATIENT)
Dept: INFUSION THERAPY | Age: 79
Discharge: HOME | End: 2024-01-26
Payer: MEDICARE

## 2024-01-26 ENCOUNTER — HOSPITAL ENCOUNTER (OUTPATIENT)
Dept: LAB | Age: 79
End: 2024-01-26
Payer: MEDICARE

## 2024-01-26 ENCOUNTER — OFFICE VISIT (OUTPATIENT)
Dept: ONCOLOGY | Age: 79
End: 2024-01-26

## 2024-01-26 VITALS
DIASTOLIC BLOOD PRESSURE: 52 MMHG | HEART RATE: 82 BPM | BODY MASS INDEX: 31.82 KG/M2 | OXYGEN SATURATION: 99 % | SYSTOLIC BLOOD PRESSURE: 106 MMHG | HEIGHT: 62 IN | RESPIRATION RATE: 16 BRPM | TEMPERATURE: 98.2 F | WEIGHT: 172.9 LBS

## 2024-01-26 DIAGNOSIS — D64.9 ANEMIA, UNSPECIFIED TYPE: ICD-10-CM

## 2024-01-26 DIAGNOSIS — N18.32 CHRONIC KIDNEY DISEASE, STAGE 3B (HCC): ICD-10-CM

## 2024-01-26 DIAGNOSIS — D64.9 ANEMIA, UNSPECIFIED TYPE: Primary | ICD-10-CM

## 2024-01-26 LAB
ALBUMIN SERPL-MCNC: 3.6 G/DL (ref 3.2–4.6)
ALBUMIN/GLOB SERPL: 0.8 (ref 0.4–1.6)
ALP SERPL-CCNC: 59 U/L (ref 50–136)
ALT SERPL-CCNC: 12 U/L (ref 12–65)
ANION GAP SERPL CALC-SCNC: 8 MMOL/L (ref 2–11)
AST SERPL-CCNC: 20 U/L (ref 15–37)
BASOPHILS # BLD: 0.1 K/UL (ref 0–0.2)
BASOPHILS NFR BLD: 2 % (ref 0–2)
BILIRUB SERPL-MCNC: 0.2 MG/DL (ref 0.2–1.1)
BUN SERPL-MCNC: 17 MG/DL (ref 8–23)
CALCIUM SERPL-MCNC: 8.9 MG/DL (ref 8.3–10.4)
CHLORIDE SERPL-SCNC: 109 MMOL/L (ref 103–113)
CO2 SERPL-SCNC: 24 MMOL/L (ref 21–32)
CREAT SERPL-MCNC: 1.6 MG/DL (ref 0.6–1)
DIFFERENTIAL METHOD BLD: ABNORMAL
EOSINOPHIL # BLD: 0.1 K/UL (ref 0–0.8)
EOSINOPHIL NFR BLD: 2 % (ref 0.5–7.8)
ERYTHROCYTE [DISTWIDTH] IN BLOOD BY AUTOMATED COUNT: 13.7 % (ref 11.9–14.6)
GLOBULIN SER CALC-MCNC: 4.3 G/DL (ref 2.8–4.5)
GLUCOSE SERPL-MCNC: 102 MG/DL (ref 65–100)
HCT VFR BLD AUTO: 30.9 % (ref 35.8–46.3)
HGB BLD-MCNC: 9.9 G/DL (ref 11.7–15.4)
IMM GRANULOCYTES # BLD AUTO: 0 K/UL (ref 0–0.5)
IMM GRANULOCYTES NFR BLD AUTO: 0 % (ref 0–5)
LYMPHOCYTES # BLD: 1.9 K/UL (ref 0.5–4.6)
LYMPHOCYTES NFR BLD: 38 % (ref 13–44)
MCH RBC QN AUTO: 32.5 PG (ref 26.1–32.9)
MCHC RBC AUTO-ENTMCNC: 32 G/DL (ref 31.4–35)
MCV RBC AUTO: 101.3 FL (ref 82–102)
MONOCYTES # BLD: 0.2 K/UL (ref 0.1–1.3)
MONOCYTES NFR BLD: 4 % (ref 4–12)
NEUTS SEG # BLD: 2.8 K/UL (ref 1.7–8.2)
NEUTS SEG NFR BLD: 54 % (ref 43–78)
NRBC # BLD: 0 K/UL (ref 0–0.2)
PLATELET # BLD AUTO: 216 K/UL (ref 150–450)
PMV BLD AUTO: 9.6 FL (ref 9.4–12.3)
POTASSIUM SERPL-SCNC: 3.2 MMOL/L (ref 3.5–5.1)
PROT SERPL-MCNC: 7.9 G/DL (ref 6.3–8.2)
RBC # BLD AUTO: 3.05 M/UL (ref 4.05–5.2)
SODIUM SERPL-SCNC: 141 MMOL/L (ref 136–146)
WBC # BLD AUTO: 5.1 K/UL (ref 4.3–11.1)

## 2024-01-26 PROCEDURE — 36415 COLL VENOUS BLD VENIPUNCTURE: CPT

## 2024-01-26 PROCEDURE — 80053 COMPREHEN METABOLIC PANEL: CPT

## 2024-01-26 PROCEDURE — 6360000002 HC RX W HCPCS: Performed by: INTERNAL MEDICINE

## 2024-01-26 PROCEDURE — 96372 THER/PROPH/DIAG INJ SC/IM: CPT

## 2024-01-26 PROCEDURE — 85025 COMPLETE CBC W/AUTO DIFF WBC: CPT

## 2024-01-26 RX ORDER — ONDANSETRON 2 MG/ML
4 INJECTION INTRAMUSCULAR; INTRAVENOUS ONCE
Start: 2024-01-26 | End: 2024-01-26

## 2024-01-26 RX ORDER — EPINEPHRINE 1 MG/ML
0.3 INJECTION, SOLUTION, CONCENTRATE INTRAVENOUS PRN
OUTPATIENT
Start: 2024-01-26

## 2024-01-26 RX ORDER — ALBUTEROL SULFATE 90 UG/1
4 AEROSOL, METERED RESPIRATORY (INHALATION) PRN
OUTPATIENT
Start: 2024-01-26

## 2024-01-26 RX ORDER — ONDANSETRON 2 MG/ML
8 INJECTION INTRAMUSCULAR; INTRAVENOUS
OUTPATIENT
Start: 2024-01-26

## 2024-01-26 RX ORDER — DIPHENHYDRAMINE HYDROCHLORIDE 50 MG/ML
50 INJECTION INTRAMUSCULAR; INTRAVENOUS
OUTPATIENT
Start: 2024-01-26

## 2024-01-26 RX ORDER — ONDANSETRON 2 MG/ML
4 INJECTION INTRAMUSCULAR; INTRAVENOUS ONCE
Status: COMPLETED | OUTPATIENT
Start: 2024-01-26 | End: 2024-01-26

## 2024-01-26 RX ORDER — ACETAMINOPHEN 325 MG/1
650 TABLET ORAL
OUTPATIENT
Start: 2024-01-26

## 2024-01-26 RX ORDER — SODIUM CHLORIDE 9 MG/ML
INJECTION, SOLUTION INTRAVENOUS CONTINUOUS
OUTPATIENT
Start: 2024-01-26

## 2024-01-26 RX ADMIN — ONDANSETRON 4 MG: 2 INJECTION INTRAMUSCULAR; INTRAVENOUS at 11:08

## 2024-01-26 RX ADMIN — EPOETIN ALFA-EPBX 40000 UNITS: 40000 INJECTION, SOLUTION INTRAVENOUS; SUBCUTANEOUS at 11:08

## 2024-01-26 ASSESSMENT — PATIENT HEALTH QUESTIONNAIRE - PHQ9
2. FEELING DOWN, DEPRESSED OR HOPELESS: 0
SUM OF ALL RESPONSES TO PHQ QUESTIONS 1-9: 0
SUM OF ALL RESPONSES TO PHQ9 QUESTIONS 1 & 2: 0
SUM OF ALL RESPONSES TO PHQ QUESTIONS 1-9: 0
1. LITTLE INTEREST OR PLEASURE IN DOING THINGS: 0

## 2024-01-26 NOTE — PATIENT INSTRUCTIONS
Patient Instructions from Today's Visit    Reason for Visit:  Follow up - Anemia     Diagnosis Information:  https://www.cancer.net/about-us/asco-answers-patient-education-materials/duxf-hwvwrra-xjam-sheets     Plan:  Labs reviewed - hemoglobin is 9.9  Proceed to infusion for Retacrit.  Please call us if you have any questions or concerns before your next visit.     Follow Up:  Infusion only every 4 weeks for Retacrit  3 months with labs prior  Infusion after office visit for Retacrit    Recent Lab Results:  Hospital Outpatient Visit on 01/26/2024   Component Date Value Ref Range Status    Sodium 01/26/2024 141  136 - 146 mmol/L Final    Potassium 01/26/2024 3.2 (L)  3.5 - 5.1 mmol/L Final    Chloride 01/26/2024 109  103 - 113 mmol/L Final    CO2 01/26/2024 24  21 - 32 mmol/L Final    Anion Gap 01/26/2024 8  2 - 11 mmol/L Final    Glucose 01/26/2024 102 (H)  65 - 100 mg/dL Final    BUN 01/26/2024 17  8 - 23 MG/DL Final    Creatinine 01/26/2024 1.60 (H)  0.6 - 1.0 MG/DL Final    Est, Glom Filt Rate 01/26/2024 33 (L)  >60 ml/min/1.73m2 Final    Comment:    Pediatric calculator link: https://www.kidney.org/professionals/kdoqi/gfr_calculatorped     These results are not intended for use in patients <18 years of age.     eGFR results are calculated without a race factor using  the 2021 CKD-EPI equation. Careful clinical correlation is recommended, particularly when comparing to results calculated using previous equations.  The CKD-EPI equation is less accurate in patients with extremes of muscle mass, extra-renal metabolism of creatinine, excessive creatine ingestion, or following therapy that affects renal tubular secretion.      Calcium 01/26/2024 8.9  8.3 - 10.4 MG/DL Final    Total Bilirubin 01/26/2024 0.2  0.2 - 1.1 MG/DL Final    ALT 01/26/2024 12  12 - 65 U/L Final    AST 01/26/2024 20  15 - 37 U/L Final    Alk Phosphatase 01/26/2024 59  50 - 136 U/L Final    Total Protein 01/26/2024 7.9  6.3 - 8.2 g/dL Final

## 2024-01-26 NOTE — PROGRESS NOTES
Arrived to the Infusion Center.  Retacrit and Zofran injections completed.   Provided education on Retacrit and Zofran. Patient has had these medications before. Opportunity for questions provided.    Patient instructed to report any side affects to ordering provider.  Patient tolerated well.   Any issues or concerns during appointment: no.  Patient aware of next infusion appointment on 2/23/2024 (date) at 10:30 am (time).  Discharged ambulatory with self.

## 2024-02-18 DIAGNOSIS — E78.5 HYPERLIPIDEMIA LDL GOAL <100: Primary | ICD-10-CM

## 2024-02-18 DIAGNOSIS — E87.6 HYPOKALEMIA: ICD-10-CM

## 2024-02-18 DIAGNOSIS — N18.32 STAGE 3B CHRONIC KIDNEY DISEASE (HCC): ICD-10-CM

## 2024-02-18 DIAGNOSIS — I10 ESSENTIAL HYPERTENSION: ICD-10-CM

## 2024-02-23 ENCOUNTER — NURSE ONLY (OUTPATIENT)
Dept: FAMILY MEDICINE CLINIC | Facility: CLINIC | Age: 79
End: 2024-02-23

## 2024-02-23 ENCOUNTER — HOSPITAL ENCOUNTER (OUTPATIENT)
Dept: INFUSION THERAPY | Age: 79
Discharge: HOME | End: 2024-02-23
Payer: MEDICARE

## 2024-02-23 ENCOUNTER — HOSPITAL ENCOUNTER (OUTPATIENT)
Dept: LAB | Age: 79
Discharge: HOME | End: 2024-02-26

## 2024-02-23 VITALS — DIASTOLIC BLOOD PRESSURE: 58 MMHG | SYSTOLIC BLOOD PRESSURE: 110 MMHG

## 2024-02-23 DIAGNOSIS — E78.5 HYPERLIPIDEMIA LDL GOAL <100: ICD-10-CM

## 2024-02-23 DIAGNOSIS — D64.9 ANEMIA, UNSPECIFIED TYPE: ICD-10-CM

## 2024-02-23 DIAGNOSIS — N18.32 STAGE 3B CHRONIC KIDNEY DISEASE (HCC): ICD-10-CM

## 2024-02-23 DIAGNOSIS — E87.6 HYPOKALEMIA: ICD-10-CM

## 2024-02-23 DIAGNOSIS — I10 ESSENTIAL HYPERTENSION: ICD-10-CM

## 2024-02-23 LAB
25(OH)D3 SERPL-MCNC: 39.7 NG/ML (ref 30–100)
25(OH)D3 SERPL-MCNC: 51.9 NG/ML (ref 30–100)
ALBUMIN SERPL-MCNC: 3.5 G/DL (ref 3.2–4.6)
ALBUMIN SERPL-MCNC: 3.6 G/DL (ref 3.2–4.6)
ALBUMIN/GLOB SERPL: 0.8 (ref 0.4–1.6)
ALBUMIN/GLOB SERPL: 1 (ref 0.4–1.6)
ALP SERPL-CCNC: 57 U/L (ref 50–136)
ALP SERPL-CCNC: 58 U/L (ref 50–136)
ALT SERPL-CCNC: 11 U/L (ref 12–65)
ALT SERPL-CCNC: 12 U/L (ref 12–65)
ANION GAP SERPL CALC-SCNC: 4 MMOL/L (ref 2–11)
ANION GAP SERPL CALC-SCNC: 6 MMOL/L (ref 2–11)
AST SERPL-CCNC: 23 U/L (ref 15–37)
AST SERPL-CCNC: 23 U/L (ref 15–37)
BASOPHILS # BLD: 0.1 K/UL (ref 0–0.2)
BASOPHILS NFR BLD: 2 % (ref 0–2)
BILIRUB SERPL-MCNC: 0.3 MG/DL (ref 0.2–1.1)
BILIRUB SERPL-MCNC: 0.3 MG/DL (ref 0.2–1.1)
BUN SERPL-MCNC: 19 MG/DL (ref 8–23)
BUN SERPL-MCNC: 20 MG/DL (ref 8–23)
CALCIUM SERPL-MCNC: 9.5 MG/DL (ref 8.3–10.4)
CALCIUM SERPL-MCNC: 9.7 MG/DL (ref 8.3–10.4)
CHLORIDE SERPL-SCNC: 109 MMOL/L (ref 103–113)
CHLORIDE SERPL-SCNC: 112 MMOL/L (ref 103–113)
CHOLEST SERPL-MCNC: 169 MG/DL
CHOLEST SERPL-MCNC: 177 MG/DL
CO2 SERPL-SCNC: 26 MMOL/L (ref 21–32)
CO2 SERPL-SCNC: 27 MMOL/L (ref 21–32)
CREAT SERPL-MCNC: 1.5 MG/DL (ref 0.6–1)
CREAT SERPL-MCNC: 1.6 MG/DL (ref 0.6–1)
DIFFERENTIAL METHOD BLD: ABNORMAL
EOSINOPHIL # BLD: 0.1 K/UL (ref 0–0.8)
EOSINOPHIL NFR BLD: 2 % (ref 0.5–7.8)
ERYTHROCYTE [DISTWIDTH] IN BLOOD BY AUTOMATED COUNT: 14 % (ref 11.9–14.6)
GLOBULIN SER CALC-MCNC: 3.6 G/DL (ref 2.8–4.5)
GLOBULIN SER CALC-MCNC: 4.4 G/DL (ref 2.8–4.5)
GLUCOSE SERPL-MCNC: 72 MG/DL (ref 65–100)
GLUCOSE SERPL-MCNC: 92 MG/DL (ref 65–100)
HCT VFR BLD AUTO: 31 % (ref 35.8–46.3)
HDLC SERPL-MCNC: 49 MG/DL (ref 40–60)
HDLC SERPL-MCNC: 51 MG/DL (ref 40–60)
HDLC SERPL: 3.3
HDLC SERPL: 3.6
HGB BLD-MCNC: 10 G/DL (ref 11.7–15.4)
IMM GRANULOCYTES # BLD AUTO: 0 K/UL (ref 0–0.5)
IMM GRANULOCYTES NFR BLD AUTO: 1 % (ref 0–5)
LDLC SERPL CALC-MCNC: 74.8 MG/DL
LDLC SERPL CALC-MCNC: 84.6 MG/DL
LYMPHOCYTES # BLD: 1.8 K/UL (ref 0.5–4.6)
LYMPHOCYTES NFR BLD: 44 % (ref 13–44)
MCH RBC QN AUTO: 32.6 PG (ref 26.1–32.9)
MCHC RBC AUTO-ENTMCNC: 32.3 G/DL (ref 31.4–35)
MCV RBC AUTO: 101 FL (ref 82–102)
MONOCYTES # BLD: 0.3 K/UL (ref 0.1–1.3)
MONOCYTES NFR BLD: 7 % (ref 4–12)
NEUTS SEG # BLD: 1.9 K/UL (ref 1.7–8.2)
NEUTS SEG NFR BLD: 44 % (ref 43–78)
NRBC # BLD: 0 K/UL (ref 0–0.2)
PLATELET # BLD AUTO: 191 K/UL (ref 150–450)
PMV BLD AUTO: 9.2 FL (ref 9.4–12.3)
POTASSIUM SERPL-SCNC: 4.1 MMOL/L (ref 3.5–5.1)
POTASSIUM SERPL-SCNC: 4.3 MMOL/L (ref 3.5–5.1)
PROT SERPL-MCNC: 7.2 G/DL (ref 6.3–8.2)
PROT SERPL-MCNC: 7.9 G/DL (ref 6.3–8.2)
RBC # BLD AUTO: 3.07 M/UL (ref 4.05–5.2)
SODIUM SERPL-SCNC: 140 MMOL/L (ref 136–146)
SODIUM SERPL-SCNC: 144 MMOL/L (ref 136–146)
TRIGL SERPL-MCNC: 216 MG/DL (ref 35–150)
TRIGL SERPL-MCNC: 217 MG/DL (ref 35–150)
VLDLC SERPL CALC-MCNC: 43.2 MG/DL (ref 6–23)
VLDLC SERPL CALC-MCNC: 43.4 MG/DL (ref 6–23)
WBC # BLD AUTO: 4.2 K/UL (ref 4.3–11.1)

## 2024-02-23 PROCEDURE — 80053 COMPREHEN METABOLIC PANEL: CPT

## 2024-02-23 PROCEDURE — 80061 LIPID PANEL: CPT

## 2024-02-23 PROCEDURE — 36415 COLL VENOUS BLD VENIPUNCTURE: CPT

## 2024-02-23 PROCEDURE — 85025 COMPLETE CBC W/AUTO DIFF WBC: CPT

## 2024-02-23 PROCEDURE — 82306 VITAMIN D 25 HYDROXY: CPT

## 2024-02-23 NOTE — PROGRESS NOTES
Labs reviewed; hemoglobin 10.  Retacrit held per MD orders. Patient updated. Discharged home ambulatory.

## 2024-03-01 ENCOUNTER — OFFICE VISIT (OUTPATIENT)
Dept: FAMILY MEDICINE CLINIC | Facility: CLINIC | Age: 79
End: 2024-03-01
Payer: MEDICARE

## 2024-03-01 VITALS
BODY MASS INDEX: 31.91 KG/M2 | DIASTOLIC BLOOD PRESSURE: 66 MMHG | RESPIRATION RATE: 16 BRPM | HEIGHT: 62 IN | SYSTOLIC BLOOD PRESSURE: 116 MMHG | OXYGEN SATURATION: 97 % | HEART RATE: 84 BPM | WEIGHT: 173.4 LBS

## 2024-03-01 DIAGNOSIS — N18.32 CHRONIC KIDNEY DISEASE, STAGE 3B (HCC): ICD-10-CM

## 2024-03-01 DIAGNOSIS — Z78.0 POST-MENOPAUSAL: ICD-10-CM

## 2024-03-01 DIAGNOSIS — I10 ESSENTIAL HYPERTENSION: Primary | ICD-10-CM

## 2024-03-01 DIAGNOSIS — E78.5 HYPERLIPIDEMIA LDL GOAL <100: ICD-10-CM

## 2024-03-01 DIAGNOSIS — Z00.00 MEDICARE ANNUAL WELLNESS VISIT, SUBSEQUENT: ICD-10-CM

## 2024-03-01 PROBLEM — N18.4 STAGE 4 CHRONIC KIDNEY DISEASE (HCC): Status: RESOLVED | Noted: 2019-03-27 | Resolved: 2024-03-01

## 2024-03-01 PROCEDURE — 1123F ACP DISCUSS/DSCN MKR DOCD: CPT | Performed by: FAMILY MEDICINE

## 2024-03-01 PROCEDURE — 3074F SYST BP LT 130 MM HG: CPT | Performed by: FAMILY MEDICINE

## 2024-03-01 PROCEDURE — 1090F PRES/ABSN URINE INCON ASSESS: CPT | Performed by: FAMILY MEDICINE

## 2024-03-01 PROCEDURE — G0439 PPPS, SUBSEQ VISIT: HCPCS | Performed by: FAMILY MEDICINE

## 2024-03-01 PROCEDURE — 3078F DIAST BP <80 MM HG: CPT | Performed by: FAMILY MEDICINE

## 2024-03-01 PROCEDURE — G8427 DOCREV CUR MEDS BY ELIG CLIN: HCPCS | Performed by: FAMILY MEDICINE

## 2024-03-01 PROCEDURE — 1036F TOBACCO NON-USER: CPT | Performed by: FAMILY MEDICINE

## 2024-03-01 PROCEDURE — G8484 FLU IMMUNIZE NO ADMIN: HCPCS | Performed by: FAMILY MEDICINE

## 2024-03-01 PROCEDURE — G8417 CALC BMI ABV UP PARAM F/U: HCPCS | Performed by: FAMILY MEDICINE

## 2024-03-01 PROCEDURE — 99214 OFFICE O/P EST MOD 30 MIN: CPT | Performed by: FAMILY MEDICINE

## 2024-03-01 PROCEDURE — G8399 PT W/DXA RESULTS DOCUMENT: HCPCS | Performed by: FAMILY MEDICINE

## 2024-03-01 RX ORDER — SIMVASTATIN 40 MG
TABLET ORAL
Qty: 90 TABLET | Refills: 3 | Status: SHIPPED | OUTPATIENT
Start: 2024-03-01

## 2024-03-01 RX ORDER — EZETIMIBE 10 MG/1
TABLET ORAL
Qty: 90 TABLET | Refills: 3 | Status: SHIPPED | OUTPATIENT
Start: 2024-03-01

## 2024-03-01 SDOH — ECONOMIC STABILITY: FOOD INSECURITY: WITHIN THE PAST 12 MONTHS, THE FOOD YOU BOUGHT JUST DIDN'T LAST AND YOU DIDN'T HAVE MONEY TO GET MORE.: NEVER TRUE

## 2024-03-01 SDOH — ECONOMIC STABILITY: FOOD INSECURITY: WITHIN THE PAST 12 MONTHS, YOU WORRIED THAT YOUR FOOD WOULD RUN OUT BEFORE YOU GOT MONEY TO BUY MORE.: NEVER TRUE

## 2024-03-01 SDOH — ECONOMIC STABILITY: INCOME INSECURITY: HOW HARD IS IT FOR YOU TO PAY FOR THE VERY BASICS LIKE FOOD, HOUSING, MEDICAL CARE, AND HEATING?: NOT HARD AT ALL

## 2024-03-01 ASSESSMENT — ENCOUNTER SYMPTOMS
NAUSEA: 0
ABDOMINAL PAIN: 0
COUGH: 0
VOMITING: 0
BLOOD IN STOOL: 0
SHORTNESS OF BREATH: 0

## 2024-03-01 ASSESSMENT — PATIENT HEALTH QUESTIONNAIRE - PHQ9
1. LITTLE INTEREST OR PLEASURE IN DOING THINGS: 0
SUM OF ALL RESPONSES TO PHQ QUESTIONS 1-9: 0
2. FEELING DOWN, DEPRESSED OR HOPELESS: 0
SUM OF ALL RESPONSES TO PHQ QUESTIONS 1-9: 0
SUM OF ALL RESPONSES TO PHQ QUESTIONS 1-9: 0
SUM OF ALL RESPONSES TO PHQ9 QUESTIONS 1 & 2: 0
SUM OF ALL RESPONSES TO PHQ QUESTIONS 1-9: 0

## 2024-03-01 NOTE — PROGRESS NOTES
[facility]  Sariah Arce Jimmy, DO  [address] [facility] [place] [address]  Ph No:  [phone]  Fax:  [phone]        Assessment/Plan:   Vivian was seen today for follow-up and medicare awv.    Diagnoses and all orders for this visit:    Essential hypertension  Controlled.  Continue hydralazine.    Hyperlipidemia LDL goal <100  Stable.  Reviewed below labs.  Continue simvastatin and Zetia.  -     simvastatin (ZOCOR) 40 MG tablet; TAKE ONE TABLET BY MOUTH EVERY NIGHT for cholesterol  -     ezetimibe (ZETIA) 10 MG tablet; TAKE ONE TABLET BY MOUTH ONE TIME DAILY for cholesterol    Chronic kidney disease, stage 3b (HCC)  Stable.  Continue to monitor.  Patient with history of angioedema to lisinopril.  She last saw nephrology in 2022.    Post-menopausal  Ordering bone density for repeat screening.  -     DEXA Bone Density Axial Skeleton; Future    Medicare annual wellness visit, subsequent  She would like to stop completing mammograms for breast cancer screening.  She reports history of benign lumps in her 30s and no other issues since then.        Labs:  No results found for this visit on 03/01/24.    Hospital Outpatient Visit on 02/23/2024   Component Date Value Ref Range Status    WBC 02/23/2024 4.2 (L)  4.3 - 11.1 K/uL Final    RBC 02/23/2024 3.07 (L)  4.05 - 5.2 M/uL Final    Hemoglobin 02/23/2024 10.0 (L)  11.7 - 15.4 g/dL Final    Hematocrit 02/23/2024 31.0 (L)  35.8 - 46.3 % Final    MCV 02/23/2024 101.0  82.0 - 102.0 FL Final    MCH 02/23/2024 32.6  26.1 - 32.9 PG Final    MCHC 02/23/2024 32.3  31.4 - 35.0 g/dL Final    RDW 02/23/2024 14.0  11.9 - 14.6 % Final    Platelets 02/23/2024 191  150 - 450 K/uL Final    MPV 02/23/2024 9.2 (L)  9.4 - 12.3 FL Final    nRBC 02/23/2024 0.00  0.0 - 0.2 K/uL Final    **Note: Absolute NRBC parameter is now reported with Hemogram**    Neutrophils % 02/23/2024 44  43 - 78 % Final    Lymphocytes % 02/23/2024 44  13 - 44 % Final    Monocytes %

## 2024-03-01 NOTE — PROGRESS NOTES
Medicare Annual Wellness Visit    Vivian Hummel is here for Follow-up (6 month follow up: HTN) and Medicare AWV    Assessment & Plan   Essential hypertension  Hyperlipidemia LDL goal <100  -     simvastatin (ZOCOR) 40 MG tablet; TAKE ONE TABLET BY MOUTH EVERY NIGHT for cholesterol, Disp-90 tablet, R-3Normal  -     ezetimibe (ZETIA) 10 MG tablet; TAKE ONE TABLET BY MOUTH ONE TIME DAILY for cholesterol, Disp-90 tablet, R-3Normal  Chronic kidney disease, stage 3b (HCC)   Post-menopausal  -     DEXA Bone Density Axial Skeleton; Future  Medicare annual wellness visit, subsequent  Recommendations for Preventive Services Due: see orders and patient instructions/AVS.  Recommended screening schedule for the next 5-10 years is provided to the patient in written form: see Patient Instructions/AVS.     Return in about 6 months (around 9/1/2024) for HTN, ckd-labs prior .     Subjective       Patient's complete Health Risk Assessment and screening values have been reviewed and are found in Flowsheets. The following problems were reviewed today and where indicated follow up appointments were made and/or referrals ordered.    Positive Risk Factor Screenings with Interventions:       Cognitive:   Clock Drawing Test (CDT): (!) Abnormal  Words recalled: 0 Words Recalled  Total Score: (!) 0  Total Score Interpretation: Abnormal Mini-Cog  Interventions:  She denies any concerning memory changes.  She is able to care for herself.  She answers all questions appropriately.  She has good recall of the office.  Discussed that I suspect this more stress due to her brother's recent passing  See AVS for additional education material            Activity, Diet, and Weight:  On average, how many days per week do you engage in moderate to strenuous exercise (like a brisk walk)?: 2 days  On average, how many minutes do you engage in exercise at this level?: 150+ min (plays golf 2 times a week)    Do you eat balanced/healthy meals regularly?:

## 2024-03-01 NOTE — PATIENT INSTRUCTIONS
per day. It is possible to meet your calcium requirement with diet alone, but a vitamin D supplement is usually necessary to meet this goal.  When exposed to the sun, use a sunscreen that protects against both UVA and UVB radiation with an SPF of 30 or greater. Reapply every 2 to 3 hours or after sweating, drying off with a towel, or swimming.  Always wear a seat belt when traveling in a car. Always wear a helmet when riding a bicycle or motorcycle.

## 2024-03-13 ENCOUNTER — HOSPITAL ENCOUNTER (OUTPATIENT)
Dept: MAMMOGRAPHY | Age: 79
Discharge: HOME | End: 2024-03-16
Attending: FAMILY MEDICINE
Payer: MEDICARE

## 2024-03-13 DIAGNOSIS — Z78.0 POST-MENOPAUSAL: ICD-10-CM

## 2024-03-13 PROCEDURE — 77080 DXA BONE DENSITY AXIAL: CPT

## 2024-03-22 ENCOUNTER — TELEPHONE (OUTPATIENT)
Dept: ONCOLOGY | Age: 79
End: 2024-03-22

## 2024-03-22 ENCOUNTER — HOSPITAL ENCOUNTER (OUTPATIENT)
Dept: LAB | Age: 79
End: 2024-03-22
Payer: MEDICARE

## 2024-03-22 ENCOUNTER — HOSPITAL ENCOUNTER (OUTPATIENT)
Dept: INFUSION THERAPY | Age: 79
Discharge: HOME | End: 2024-03-22
Payer: MEDICARE

## 2024-03-22 VITALS
RESPIRATION RATE: 16 BRPM | SYSTOLIC BLOOD PRESSURE: 103 MMHG | TEMPERATURE: 97.4 F | OXYGEN SATURATION: 97 % | HEART RATE: 67 BPM | DIASTOLIC BLOOD PRESSURE: 70 MMHG

## 2024-03-22 DIAGNOSIS — D64.9 ANEMIA, UNSPECIFIED TYPE: ICD-10-CM

## 2024-03-22 DIAGNOSIS — N18.32 CHRONIC KIDNEY DISEASE, STAGE 3B (HCC): ICD-10-CM

## 2024-03-22 DIAGNOSIS — D64.9 ANEMIA, UNSPECIFIED TYPE: Primary | ICD-10-CM

## 2024-03-22 LAB
BASOPHILS # BLD: 0.1 K/UL (ref 0–0.2)
BASOPHILS NFR BLD: 2 % (ref 0–2)
DIFFERENTIAL METHOD BLD: ABNORMAL
EOSINOPHIL # BLD: 0.1 K/UL (ref 0–0.8)
EOSINOPHIL NFR BLD: 3 % (ref 0.5–7.8)
ERYTHROCYTE [DISTWIDTH] IN BLOOD BY AUTOMATED COUNT: 13.6 % (ref 11.9–14.6)
HCT VFR BLD AUTO: 29.7 % (ref 35.8–46.3)
HGB BLD-MCNC: 9.9 G/DL (ref 11.7–15.4)
IMM GRANULOCYTES # BLD AUTO: 0 K/UL (ref 0–0.5)
IMM GRANULOCYTES NFR BLD AUTO: 1 % (ref 0–5)
LYMPHOCYTES # BLD: 1.8 K/UL (ref 0.5–4.6)
LYMPHOCYTES NFR BLD: 44 % (ref 13–44)
MCH RBC QN AUTO: 32.8 PG (ref 26.1–32.9)
MCHC RBC AUTO-ENTMCNC: 33.3 G/DL (ref 31.4–35)
MCV RBC AUTO: 98.3 FL (ref 82–102)
MONOCYTES # BLD: 0.2 K/UL (ref 0.1–1.3)
MONOCYTES NFR BLD: 6 % (ref 4–12)
NEUTS SEG # BLD: 1.8 K/UL (ref 1.7–8.2)
NEUTS SEG NFR BLD: 46 % (ref 43–78)
NRBC # BLD: 0 K/UL (ref 0–0.2)
PLATELET # BLD AUTO: 176 K/UL (ref 150–450)
PMV BLD AUTO: 9.8 FL (ref 9.4–12.3)
RBC # BLD AUTO: 3.02 M/UL (ref 4.05–5.2)
WBC # BLD AUTO: 4 K/UL (ref 4.3–11.1)

## 2024-03-22 PROCEDURE — 85025 COMPLETE CBC W/AUTO DIFF WBC: CPT

## 2024-03-22 PROCEDURE — 6360000002 HC RX W HCPCS: Performed by: INTERNAL MEDICINE

## 2024-03-22 PROCEDURE — 96372 THER/PROPH/DIAG INJ SC/IM: CPT

## 2024-03-22 PROCEDURE — 6370000000 HC RX 637 (ALT 250 FOR IP): Performed by: INTERNAL MEDICINE

## 2024-03-22 PROCEDURE — 36415 COLL VENOUS BLD VENIPUNCTURE: CPT

## 2024-03-22 RX ORDER — ALBUTEROL SULFATE 90 UG/1
4 AEROSOL, METERED RESPIRATORY (INHALATION) PRN
OUTPATIENT
Start: 2024-03-22

## 2024-03-22 RX ORDER — SODIUM CHLORIDE 9 MG/ML
INJECTION, SOLUTION INTRAVENOUS CONTINUOUS
OUTPATIENT
Start: 2024-03-22

## 2024-03-22 RX ORDER — ONDANSETRON 2 MG/ML
4 INJECTION INTRAMUSCULAR; INTRAVENOUS ONCE
Status: DISCONTINUED | OUTPATIENT
Start: 2024-03-22 | End: 2024-03-22

## 2024-03-22 RX ORDER — ONDANSETRON 8 MG/1
4 TABLET, ORALLY DISINTEGRATING ORAL EVERY 8 HOURS PRN
Status: DISCONTINUED | OUTPATIENT
Start: 2024-03-22 | End: 2024-03-22

## 2024-03-22 RX ORDER — ONDANSETRON 8 MG/1
8 TABLET, ORALLY DISINTEGRATING ORAL EVERY 8 HOURS PRN
Status: DISCONTINUED | OUTPATIENT
Start: 2024-03-22 | End: 2024-03-23 | Stop reason: HOSPADM

## 2024-03-22 RX ORDER — DIPHENHYDRAMINE HYDROCHLORIDE 50 MG/ML
50 INJECTION INTRAMUSCULAR; INTRAVENOUS
OUTPATIENT
Start: 2024-03-22

## 2024-03-22 RX ORDER — ONDANSETRON 2 MG/ML
8 INJECTION INTRAMUSCULAR; INTRAVENOUS
OUTPATIENT
Start: 2024-03-22

## 2024-03-22 RX ORDER — FAMOTIDINE 20 MG/1
20 TABLET, FILM COATED ORAL 2 TIMES DAILY
Qty: 60 TABLET | Refills: 3 | Status: SHIPPED | OUTPATIENT
Start: 2024-03-22

## 2024-03-22 RX ORDER — ACETAMINOPHEN 325 MG/1
650 TABLET ORAL
OUTPATIENT
Start: 2024-03-22

## 2024-03-22 RX ORDER — EPINEPHRINE 1 MG/ML
0.3 INJECTION, SOLUTION, CONCENTRATE INTRAVENOUS PRN
OUTPATIENT
Start: 2024-03-22

## 2024-03-22 RX ORDER — ONDANSETRON 2 MG/ML
4 INJECTION INTRAMUSCULAR; INTRAVENOUS ONCE
Start: 2024-03-22 | End: 2024-03-22

## 2024-03-22 RX ADMIN — ONDANSETRON 8 MG: 8 TABLET, ORALLY DISINTEGRATING ORAL at 11:46

## 2024-03-22 RX ADMIN — EPOETIN ALFA-EPBX 40000 UNITS: 40000 INJECTION, SOLUTION INTRAVENOUS; SUBCUTANEOUS at 11:47

## 2024-03-22 ASSESSMENT — PAIN DESCRIPTION - DESCRIPTORS: DESCRIPTORS: ACHING

## 2024-03-22 ASSESSMENT — PAIN SCALES - GENERAL: PAINLEVEL_OUTOF10: 3

## 2024-03-22 ASSESSMENT — PAIN DESCRIPTION - ONSET: ONSET: ON-GOING

## 2024-03-22 ASSESSMENT — PAIN DESCRIPTION - FREQUENCY: FREQUENCY: CONTINUOUS

## 2024-03-22 ASSESSMENT — PAIN DESCRIPTION - LOCATION: LOCATION: ABDOMEN

## 2024-03-22 ASSESSMENT — PAIN DESCRIPTION - ORIENTATION: ORIENTATION: LOWER;LEFT;RIGHT

## 2024-03-22 NOTE — TELEPHONE ENCOUNTER
Attempted to contact, left HIPAA compliant      ----- Message from Harvey Watkins MD sent at 3/22/2024 11:49 AM EDT -----  I just Rx pepcid to the pt for epigastric discomfort reported by infusion RN. Please inform Ms. Hummel. Follow up as scheduled.

## 2024-03-22 NOTE — PROGRESS NOTES
Arrived to the Infusion Center.  Retacrit injection and Zofran SL completed. Patient tolerated well.   Any issues or concerns during appointment: Patient reports ongoing lower abdominal pain for the last 3 weeks.  Dr. Watkins notifed of this and order received to administer Zofran SL instead of IM Zofran.  Patient aware of next infusion appointment on 4/19 (date) at 11:45 (time).  Patient instructed to call provider with temperature of 100.4 or greater or nausea/vomiting/ diarrhea or pain not controlled by medications  Discharged ambulatory.

## 2024-04-19 ENCOUNTER — OFFICE VISIT (OUTPATIENT)
Dept: ONCOLOGY | Age: 79
End: 2024-04-19
Payer: MEDICARE

## 2024-04-19 ENCOUNTER — HOSPITAL ENCOUNTER (OUTPATIENT)
Dept: LAB | Age: 79
End: 2024-04-19
Payer: MEDICARE

## 2024-04-19 ENCOUNTER — HOSPITAL ENCOUNTER (OUTPATIENT)
Dept: INFUSION THERAPY | Age: 79
Setting detail: INFUSION SERIES
Discharge: HOME | End: 2024-04-19

## 2024-04-19 VITALS
RESPIRATION RATE: 16 BRPM | DIASTOLIC BLOOD PRESSURE: 71 MMHG | TEMPERATURE: 97.8 F | WEIGHT: 173.2 LBS | BODY MASS INDEX: 31.87 KG/M2 | SYSTOLIC BLOOD PRESSURE: 117 MMHG | HEIGHT: 62 IN | OXYGEN SATURATION: 99 % | HEART RATE: 66 BPM

## 2024-04-19 DIAGNOSIS — D50.9 IRON DEFICIENCY ANEMIA, UNSPECIFIED IRON DEFICIENCY ANEMIA TYPE: ICD-10-CM

## 2024-04-19 DIAGNOSIS — D50.9 IRON DEFICIENCY ANEMIA, UNSPECIFIED IRON DEFICIENCY ANEMIA TYPE: Primary | ICD-10-CM

## 2024-04-19 DIAGNOSIS — D64.9 ANEMIA, UNSPECIFIED TYPE: ICD-10-CM

## 2024-04-19 DIAGNOSIS — N18.30 STAGE 3 CHRONIC KIDNEY DISEASE, UNSPECIFIED WHETHER STAGE 3A OR 3B CKD (HCC): ICD-10-CM

## 2024-04-19 LAB
BASOPHILS # BLD: 0.1 K/UL (ref 0–0.2)
BASOPHILS NFR BLD: 1 % (ref 0–2)
DIFFERENTIAL METHOD BLD: ABNORMAL
EOSINOPHIL # BLD: 0.1 K/UL (ref 0–0.8)
EOSINOPHIL NFR BLD: 3 % (ref 0.5–7.8)
ERYTHROCYTE [DISTWIDTH] IN BLOOD BY AUTOMATED COUNT: 13.7 % (ref 11.9–14.6)
FERRITIN SERPL-MCNC: 309 NG/ML (ref 8–388)
HCT VFR BLD AUTO: 31.1 % (ref 35.8–46.3)
HGB BLD-MCNC: 10 G/DL (ref 11.7–15.4)
IMM GRANULOCYTES # BLD AUTO: 0 K/UL (ref 0–0.5)
IMM GRANULOCYTES NFR BLD AUTO: 0 % (ref 0–5)
LYMPHOCYTES # BLD: 2 K/UL (ref 0.5–4.6)
LYMPHOCYTES NFR BLD: 46 % (ref 13–44)
MCH RBC QN AUTO: 32.5 PG (ref 26.1–32.9)
MCHC RBC AUTO-ENTMCNC: 32.2 G/DL (ref 31.4–35)
MCV RBC AUTO: 101 FL (ref 82–102)
MONOCYTES # BLD: 0.3 K/UL (ref 0.1–1.3)
MONOCYTES NFR BLD: 6 % (ref 4–12)
NEUTS SEG # BLD: 1.8 K/UL (ref 1.7–8.2)
NEUTS SEG NFR BLD: 44 % (ref 43–78)
NRBC # BLD: 0 K/UL (ref 0–0.2)
PLATELET # BLD AUTO: 174 K/UL (ref 150–450)
PMV BLD AUTO: 9.4 FL (ref 9.4–12.3)
RBC # BLD AUTO: 3.08 M/UL (ref 4.05–5.2)
WBC # BLD AUTO: 4.2 K/UL (ref 4.3–11.1)

## 2024-04-19 PROCEDURE — 3074F SYST BP LT 130 MM HG: CPT | Performed by: NURSE PRACTITIONER

## 2024-04-19 PROCEDURE — 85025 COMPLETE CBC W/AUTO DIFF WBC: CPT

## 2024-04-19 PROCEDURE — 99214 OFFICE O/P EST MOD 30 MIN: CPT | Performed by: NURSE PRACTITIONER

## 2024-04-19 PROCEDURE — G8417 CALC BMI ABV UP PARAM F/U: HCPCS | Performed by: NURSE PRACTITIONER

## 2024-04-19 PROCEDURE — 3078F DIAST BP <80 MM HG: CPT | Performed by: NURSE PRACTITIONER

## 2024-04-19 PROCEDURE — 1090F PRES/ABSN URINE INCON ASSESS: CPT | Performed by: NURSE PRACTITIONER

## 2024-04-19 PROCEDURE — 1123F ACP DISCUSS/DSCN MKR DOCD: CPT | Performed by: NURSE PRACTITIONER

## 2024-04-19 PROCEDURE — 36415 COLL VENOUS BLD VENIPUNCTURE: CPT

## 2024-04-19 PROCEDURE — 82728 ASSAY OF FERRITIN: CPT

## 2024-04-19 PROCEDURE — G8427 DOCREV CUR MEDS BY ELIG CLIN: HCPCS | Performed by: NURSE PRACTITIONER

## 2024-04-19 PROCEDURE — G8399 PT W/DXA RESULTS DOCUMENT: HCPCS | Performed by: NURSE PRACTITIONER

## 2024-04-19 PROCEDURE — 1036F TOBACCO NON-USER: CPT | Performed by: NURSE PRACTITIONER

## 2024-04-19 ASSESSMENT — PATIENT HEALTH QUESTIONNAIRE - PHQ9
1. LITTLE INTEREST OR PLEASURE IN DOING THINGS: NOT AT ALL
SUM OF ALL RESPONSES TO PHQ QUESTIONS 1-9: 0
SUM OF ALL RESPONSES TO PHQ9 QUESTIONS 1 & 2: 0
SUM OF ALL RESPONSES TO PHQ QUESTIONS 1-9: 0
2. FEELING DOWN, DEPRESSED OR HOPELESS: NOT AT ALL

## 2024-04-19 NOTE — PROGRESS NOTES
[facility]: Office Visit Established Patient    Reason for follow up:    Macrocytic anemia    Overview: (copied from prior)    Ms. Hummel is a 77 years old female patient with history of hypertension, hyperlipidemia, chronic kidney disease, colon polyps, total abdominal hysterectomy with bilateral salpingo-oophorectomy and spontaneous pneumothorax who has been referred for evaluation of microcytic anemia.    On 8/26/202022, she presented to her PCP with complaints of fatigue and shortness of breath with activity.  Hemoglobin was down to 8.3 from 11 (in March 2022).     On evaluation today, patient reports moderate persistent fatigue and exertional dyspnea with onset a few months ago.  She denies fevers, night sweats, chest pain, bloody/black stools, nausea, vomiting, change in appetite, heartburn, headache, gait disturbances, tingling/numbness or weakness in any part of her body.  She denies any swollen glands easy bleeding or bruising.  She has intentionally lost about 24 pounds over the course of last few months by following healthier eating habits.     She is scheduled to undergo EGD and colonoscopy on 10/19/2022.  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  EGD showed gastritis determined by biopsy.      Interval history:  Returns for lab review prior to next retacrit injection.  Interval history updated in the assessment and plan.       Review of Systems:  14 point ROS was negative except as per HPI      ECOG PERFORMANCE STATUS - 0-Fully active, able to carry on all pre-disease performance without restriction.    Pain - Pain Score:   0 - No pain (fatigue 5)/10. None/Minimal pain - not affecting QOL     Fatigue - Failed to redirect to the Timeline version of the [employer] SmartLink.  Distress -        No data to display              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, diverticula colon, internal hemorrhoids       Reviewed

## 2024-05-17 ENCOUNTER — HOSPITAL ENCOUNTER (OUTPATIENT)
Dept: INFUSION THERAPY | Age: 79
Setting detail: INFUSION SERIES
Discharge: HOME | End: 2024-05-17
Payer: MEDICARE

## 2024-05-17 ENCOUNTER — HOSPITAL ENCOUNTER (OUTPATIENT)
Dept: LAB | Age: 79
End: 2024-05-17
Payer: MEDICARE

## 2024-05-17 DIAGNOSIS — N18.32 CHRONIC KIDNEY DISEASE, STAGE 3B (HCC): ICD-10-CM

## 2024-05-17 DIAGNOSIS — D64.9 ANEMIA, UNSPECIFIED TYPE: Primary | ICD-10-CM

## 2024-05-17 DIAGNOSIS — N18.30 STAGE 3 CHRONIC KIDNEY DISEASE, UNSPECIFIED WHETHER STAGE 3A OR 3B CKD (HCC): ICD-10-CM

## 2024-05-17 DIAGNOSIS — D64.9 ANEMIA, UNSPECIFIED TYPE: ICD-10-CM

## 2024-05-17 DIAGNOSIS — N18.30 STAGE 3 CHRONIC KIDNEY DISEASE, UNSPECIFIED WHETHER STAGE 3A OR 3B CKD (HCC): Primary | ICD-10-CM

## 2024-05-17 LAB
ALBUMIN SERPL-MCNC: 3.8 G/DL (ref 3.2–4.6)
ALBUMIN/GLOB SERPL: 1 (ref 1–1.9)
ALP SERPL-CCNC: 62 U/L (ref 35–104)
ALT SERPL-CCNC: 11 U/L (ref 12–65)
ANION GAP SERPL CALC-SCNC: 12 MMOL/L (ref 9–18)
AST SERPL-CCNC: 30 U/L (ref 15–37)
BILIRUB SERPL-MCNC: 0.3 MG/DL (ref 0–1.2)
BUN SERPL-MCNC: 28 MG/DL (ref 8–23)
CALCIUM SERPL-MCNC: 9.1 MG/DL (ref 8.8–10.2)
CHLORIDE SERPL-SCNC: 103 MMOL/L (ref 98–107)
CO2 SERPL-SCNC: 25 MMOL/L (ref 20–28)
CREAT SERPL-MCNC: 1.7 MG/DL (ref 0.6–1.1)
ERYTHROCYTE [DISTWIDTH] IN BLOOD BY AUTOMATED COUNT: 13.5 % (ref 11.9–14.6)
GLOBULIN SER CALC-MCNC: 3.8 G/DL (ref 2.3–3.5)
GLUCOSE SERPL-MCNC: 71 MG/DL (ref 70–99)
HCT VFR BLD AUTO: 30.3 % (ref 35.8–46.3)
HGB BLD-MCNC: 9.9 G/DL (ref 11.7–15.4)
MCH RBC QN AUTO: 32.6 PG (ref 26.1–32.9)
MCHC RBC AUTO-ENTMCNC: 32.7 G/DL (ref 31.4–35)
MCV RBC AUTO: 99.7 FL (ref 82–102)
NRBC # BLD: 0 K/UL (ref 0–0.2)
PLATELET # BLD AUTO: 168 K/UL (ref 150–450)
PMV BLD AUTO: 9.4 FL (ref 9.4–12.3)
POTASSIUM SERPL-SCNC: 4 MMOL/L (ref 3.5–5.1)
PROT SERPL-MCNC: 7.6 G/DL (ref 6.3–8.2)
RBC # BLD AUTO: 3.04 M/UL (ref 4.05–5.2)
SODIUM SERPL-SCNC: 140 MMOL/L (ref 136–145)
WBC # BLD AUTO: 4.2 K/UL (ref 4.3–11.1)

## 2024-05-17 PROCEDURE — 6360000002 HC RX W HCPCS: Performed by: INTERNAL MEDICINE

## 2024-05-17 PROCEDURE — 96372 THER/PROPH/DIAG INJ SC/IM: CPT

## 2024-05-17 PROCEDURE — 80053 COMPREHEN METABOLIC PANEL: CPT

## 2024-05-17 PROCEDURE — 36415 COLL VENOUS BLD VENIPUNCTURE: CPT

## 2024-05-17 PROCEDURE — 85027 COMPLETE CBC AUTOMATED: CPT

## 2024-05-17 RX ORDER — ONDANSETRON 2 MG/ML
4 INJECTION INTRAMUSCULAR; INTRAVENOUS ONCE
Status: COMPLETED | OUTPATIENT
Start: 2024-05-17 | End: 2024-05-17

## 2024-05-17 RX ORDER — ONDANSETRON 2 MG/ML
8 INJECTION INTRAMUSCULAR; INTRAVENOUS
OUTPATIENT
Start: 2024-05-17

## 2024-05-17 RX ORDER — ALBUTEROL SULFATE 90 UG/1
4 AEROSOL, METERED RESPIRATORY (INHALATION) PRN
Status: DISCONTINUED | OUTPATIENT
Start: 2024-05-17 | End: 2024-05-18 | Stop reason: HOSPADM

## 2024-05-17 RX ORDER — EPINEPHRINE 1 MG/ML
0.3 INJECTION, SOLUTION, CONCENTRATE INTRAVENOUS PRN
OUTPATIENT
Start: 2024-05-17

## 2024-05-17 RX ORDER — ALBUTEROL SULFATE 90 UG/1
4 AEROSOL, METERED RESPIRATORY (INHALATION) PRN
OUTPATIENT
Start: 2024-05-17

## 2024-05-17 RX ORDER — ACETAMINOPHEN 325 MG/1
650 TABLET ORAL
OUTPATIENT
Start: 2024-05-17

## 2024-05-17 RX ORDER — ONDANSETRON 2 MG/ML
4 INJECTION INTRAMUSCULAR; INTRAVENOUS ONCE
Start: 2024-05-17 | End: 2024-05-17

## 2024-05-17 RX ORDER — EPINEPHRINE 1 MG/ML
0.3 INJECTION, SOLUTION, CONCENTRATE INTRAVENOUS PRN
Status: DISCONTINUED | OUTPATIENT
Start: 2024-05-17 | End: 2024-05-18 | Stop reason: HOSPADM

## 2024-05-17 RX ORDER — DIPHENHYDRAMINE HYDROCHLORIDE 50 MG/ML
50 INJECTION INTRAMUSCULAR; INTRAVENOUS
OUTPATIENT
Start: 2024-05-17

## 2024-05-17 RX ORDER — SODIUM CHLORIDE 9 MG/ML
INJECTION, SOLUTION INTRAVENOUS CONTINUOUS
OUTPATIENT
Start: 2024-05-17

## 2024-05-17 RX ORDER — ONDANSETRON 2 MG/ML
8 INJECTION INTRAMUSCULAR; INTRAVENOUS
Status: DISCONTINUED | OUTPATIENT
Start: 2024-05-17 | End: 2024-05-18 | Stop reason: HOSPADM

## 2024-05-17 RX ORDER — DIPHENHYDRAMINE HYDROCHLORIDE 50 MG/ML
50 INJECTION INTRAMUSCULAR; INTRAVENOUS
Status: DISCONTINUED | OUTPATIENT
Start: 2024-05-17 | End: 2024-05-18 | Stop reason: HOSPADM

## 2024-05-17 RX ORDER — ACETAMINOPHEN 325 MG/1
650 TABLET ORAL
Status: DISCONTINUED | OUTPATIENT
Start: 2024-05-17 | End: 2024-05-18 | Stop reason: HOSPADM

## 2024-05-17 RX ADMIN — EPOETIN ALFA-EPBX 40000 UNITS: 40000 INJECTION, SOLUTION INTRAVENOUS; SUBCUTANEOUS at 11:49

## 2024-05-17 RX ADMIN — ONDANSETRON 4 MG: 2 INJECTION INTRAMUSCULAR; INTRAVENOUS at 11:49

## 2024-05-17 NOTE — PROGRESS NOTES
Arrived to the Infusion Center.  Retacrit completed.   Provided education on side effects.    Patient instructed to report any side affects to ordering provider.  Patient tolerated without complication.   Any issues or concerns during appointment: No.  Patient aware of next infusion appointment on 06/14/24 (date) at 1130 (time).  Discharged ambulatory.

## 2024-06-14 ENCOUNTER — HOSPITAL ENCOUNTER (OUTPATIENT)
Dept: LAB | Age: 79
End: 2024-06-14
Payer: MEDICARE

## 2024-06-14 ENCOUNTER — HOSPITAL ENCOUNTER (OUTPATIENT)
Dept: INFUSION THERAPY | Age: 79
Setting detail: INFUSION SERIES
Discharge: HOME | End: 2024-06-14

## 2024-06-14 VITALS
OXYGEN SATURATION: 98 % | SYSTOLIC BLOOD PRESSURE: 117 MMHG | TEMPERATURE: 97.4 F | DIASTOLIC BLOOD PRESSURE: 75 MMHG | HEART RATE: 65 BPM | RESPIRATION RATE: 16 BRPM

## 2024-06-14 DIAGNOSIS — D64.9 ANEMIA, UNSPECIFIED TYPE: ICD-10-CM

## 2024-06-14 LAB
BASOPHILS # BLD: 0.1 K/UL (ref 0–0.2)
BASOPHILS NFR BLD: 2 % (ref 0–2)
DIFFERENTIAL METHOD BLD: ABNORMAL
EOSINOPHIL # BLD: 0.1 K/UL (ref 0–0.8)
EOSINOPHIL NFR BLD: 2 % (ref 0.5–7.8)
ERYTHROCYTE [DISTWIDTH] IN BLOOD BY AUTOMATED COUNT: 13.7 % (ref 11.9–14.6)
HCT VFR BLD AUTO: 31.6 % (ref 35.8–46.3)
HGB BLD-MCNC: 10.2 G/DL (ref 11.7–15.4)
IMM GRANULOCYTES # BLD AUTO: 0 K/UL (ref 0–0.5)
IMM GRANULOCYTES NFR BLD AUTO: 0 % (ref 0–5)
LYMPHOCYTES # BLD: 1.9 K/UL (ref 0.5–4.6)
LYMPHOCYTES NFR BLD: 39 % (ref 13–44)
MCH RBC QN AUTO: 32.2 PG (ref 26.1–32.9)
MCHC RBC AUTO-ENTMCNC: 32.3 G/DL (ref 31.4–35)
MCV RBC AUTO: 99.7 FL (ref 82–102)
MONOCYTES # BLD: 0.3 K/UL (ref 0.1–1.3)
MONOCYTES NFR BLD: 7 % (ref 4–12)
NEUTS SEG # BLD: 2.4 K/UL (ref 1.7–8.2)
NEUTS SEG NFR BLD: 50 % (ref 43–78)
NRBC # BLD: 0.02 K/UL (ref 0–0.2)
PLATELET # BLD AUTO: 213 K/UL (ref 150–450)
PMV BLD AUTO: 9.9 FL (ref 9.4–12.3)
RBC # BLD AUTO: 3.17 M/UL (ref 4.05–5.2)
WBC # BLD AUTO: 4.7 K/UL (ref 4.3–11.1)

## 2024-06-14 PROCEDURE — 85025 COMPLETE CBC W/AUTO DIFF WBC: CPT

## 2024-06-14 PROCEDURE — 36415 COLL VENOUS BLD VENIPUNCTURE: CPT

## 2024-06-14 NOTE — PROGRESS NOTES
Arrived to the Infusion Center.  Hgb 10.2 today.  Dr Watkins notified. Orders received to hold Retacrit injection today.  Patient aware of next appt on 7/12 @ 11:30 AM.

## 2024-07-11 NOTE — PROGRESS NOTES
[facility]: Office Visit Established Patient    Reason for follow up:    Macrocytic anemia    Overview: (copied from prior)    Ms. Hummel is a 77 years old female patient with history of hypertension, hyperlipidemia, chronic kidney disease, colon polyps, total abdominal hysterectomy with bilateral salpingo-oophorectomy and spontaneous pneumothorax who has been referred for evaluation of microcytic anemia.    On 8/26/202022, she presented to her PCP with complaints of fatigue and shortness of breath with activity.  Hemoglobin was down to 8.3 from 11 (in March 2022).     On evaluation today, patient reports moderate persistent fatigue and exertional dyspnea with onset a few months ago.  She denies fevers, night sweats, chest pain, bloody/black stools, nausea, vomiting, change in appetite, heartburn, headache, gait disturbances, tingling/numbness or weakness in any part of her body.  She denies any swollen glands easy bleeding or bruising.  She has intentionally lost about 24 pounds over the course of last few months by following healthier eating habits.     She is scheduled to undergo EGD and colonoscopy on 10/19/2022.  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  EGD showed gastritis determined by biopsy.      Interval history:  Returns for lab review prior to next retacrit injection.  Interval history updated in the assessment and plan.       Review of Systems:  14 point ROS was negative except as per HPI      ECOG PERFORMANCE STATUS - 0-Fully active, able to carry on all pre-disease performance without restriction.    Pain - Pain Score:   5 (fatigue 8)/10. None/Minimal pain - not affecting QOL     Fatigue - Failed to redirect to the Timeline version of the [employer] SmartLink.  Distress -        No data to display              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, diverticula colon, internal hemorrhoids       Reviewed and updated

## 2024-07-12 ENCOUNTER — HOSPITAL ENCOUNTER (OUTPATIENT)
Dept: LAB | Age: 79
End: 2024-07-12
Payer: MEDICARE

## 2024-07-12 ENCOUNTER — OFFICE VISIT (OUTPATIENT)
Dept: ONCOLOGY | Age: 79
End: 2024-07-12
Payer: MEDICARE

## 2024-07-12 ENCOUNTER — HOSPITAL ENCOUNTER (OUTPATIENT)
Dept: INFUSION THERAPY | Age: 79
Setting detail: INFUSION SERIES
Discharge: HOME | End: 2024-07-12

## 2024-07-12 VITALS
WEIGHT: 172.4 LBS | OXYGEN SATURATION: 100 % | HEIGHT: 62 IN | RESPIRATION RATE: 16 BRPM | BODY MASS INDEX: 31.73 KG/M2 | DIASTOLIC BLOOD PRESSURE: 56 MMHG | TEMPERATURE: 97.6 F | HEART RATE: 66 BPM | SYSTOLIC BLOOD PRESSURE: 114 MMHG

## 2024-07-12 DIAGNOSIS — N18.30 STAGE 3 CHRONIC KIDNEY DISEASE, UNSPECIFIED WHETHER STAGE 3A OR 3B CKD (HCC): ICD-10-CM

## 2024-07-12 DIAGNOSIS — M54.41 MIDLINE LOW BACK PAIN WITH BILATERAL SCIATICA, UNSPECIFIED CHRONICITY: Primary | ICD-10-CM

## 2024-07-12 DIAGNOSIS — M54.42 MIDLINE LOW BACK PAIN WITH BILATERAL SCIATICA, UNSPECIFIED CHRONICITY: Primary | ICD-10-CM

## 2024-07-12 DIAGNOSIS — D64.9 ANEMIA, UNSPECIFIED TYPE: ICD-10-CM

## 2024-07-12 DIAGNOSIS — D50.9 IRON DEFICIENCY ANEMIA, UNSPECIFIED IRON DEFICIENCY ANEMIA TYPE: ICD-10-CM

## 2024-07-12 DIAGNOSIS — R53.83 FATIGUE, UNSPECIFIED TYPE: ICD-10-CM

## 2024-07-12 LAB
ALBUMIN SERPL-MCNC: 3.8 G/DL (ref 3.2–4.6)
ALBUMIN/GLOB SERPL: 1 (ref 1–1.9)
ALP SERPL-CCNC: 65 U/L (ref 35–104)
ALT SERPL-CCNC: 16 U/L (ref 12–65)
ANION GAP SERPL CALC-SCNC: 12 MMOL/L (ref 9–18)
AST SERPL-CCNC: 37 U/L (ref 15–37)
BASOPHILS # BLD: 0.1 K/UL (ref 0–0.2)
BASOPHILS NFR BLD: 1 % (ref 0–2)
BILIRUB SERPL-MCNC: 0.4 MG/DL (ref 0–1.2)
BUN SERPL-MCNC: 28 MG/DL (ref 8–23)
CALCIUM SERPL-MCNC: 9.1 MG/DL (ref 8.8–10.2)
CHLORIDE SERPL-SCNC: 103 MMOL/L (ref 98–107)
CO2 SERPL-SCNC: 25 MMOL/L (ref 20–28)
CREAT SERPL-MCNC: 1.71 MG/DL (ref 0.6–1.1)
DIFFERENTIAL METHOD BLD: ABNORMAL
EOSINOPHIL # BLD: 0.1 K/UL (ref 0–0.8)
EOSINOPHIL NFR BLD: 3 % (ref 0.5–7.8)
ERYTHROCYTE [DISTWIDTH] IN BLOOD BY AUTOMATED COUNT: 13.7 % (ref 11.9–14.6)
FERRITIN SERPL-MCNC: 491 NG/ML (ref 8–388)
GLOBULIN SER CALC-MCNC: 4 G/DL (ref 2.3–3.5)
GLUCOSE SERPL-MCNC: 97 MG/DL (ref 70–99)
HCT VFR BLD AUTO: 31.6 % (ref 35.8–46.3)
HGB BLD-MCNC: 10.1 G/DL (ref 11.7–15.4)
IMM GRANULOCYTES # BLD AUTO: 0 K/UL (ref 0–0.5)
IMM GRANULOCYTES NFR BLD AUTO: 1 % (ref 0–5)
LYMPHOCYTES # BLD: 1.9 K/UL (ref 0.5–4.6)
LYMPHOCYTES NFR BLD: 37 % (ref 13–44)
MCH RBC QN AUTO: 32.1 PG (ref 26.1–32.9)
MCHC RBC AUTO-ENTMCNC: 32 G/DL (ref 31.4–35)
MCV RBC AUTO: 100.3 FL (ref 82–102)
MONOCYTES # BLD: 0.3 K/UL (ref 0.1–1.3)
MONOCYTES NFR BLD: 6 % (ref 4–12)
NEUTS SEG # BLD: 2.6 K/UL (ref 1.7–8.2)
NEUTS SEG NFR BLD: 52 % (ref 43–78)
NRBC # BLD: 0 K/UL (ref 0–0.2)
PLATELET # BLD AUTO: 192 K/UL (ref 150–450)
PMV BLD AUTO: 9.8 FL (ref 9.4–12.3)
POTASSIUM SERPL-SCNC: 4 MMOL/L (ref 3.5–5.1)
PROT SERPL-MCNC: 7.8 G/DL (ref 6.3–8.2)
RBC # BLD AUTO: 3.15 M/UL (ref 4.05–5.2)
SODIUM SERPL-SCNC: 140 MMOL/L (ref 136–145)
WBC # BLD AUTO: 5 K/UL (ref 4.3–11.1)

## 2024-07-12 PROCEDURE — 80053 COMPREHEN METABOLIC PANEL: CPT

## 2024-07-12 PROCEDURE — 3078F DIAST BP <80 MM HG: CPT | Performed by: INTERNAL MEDICINE

## 2024-07-12 PROCEDURE — 36415 COLL VENOUS BLD VENIPUNCTURE: CPT

## 2024-07-12 PROCEDURE — 85025 COMPLETE CBC W/AUTO DIFF WBC: CPT

## 2024-07-12 PROCEDURE — G8427 DOCREV CUR MEDS BY ELIG CLIN: HCPCS | Performed by: INTERNAL MEDICINE

## 2024-07-12 PROCEDURE — 1036F TOBACCO NON-USER: CPT | Performed by: INTERNAL MEDICINE

## 2024-07-12 PROCEDURE — 1123F ACP DISCUSS/DSCN MKR DOCD: CPT | Performed by: INTERNAL MEDICINE

## 2024-07-12 PROCEDURE — 3074F SYST BP LT 130 MM HG: CPT | Performed by: INTERNAL MEDICINE

## 2024-07-12 PROCEDURE — 99213 OFFICE O/P EST LOW 20 MIN: CPT | Performed by: INTERNAL MEDICINE

## 2024-07-12 PROCEDURE — 82728 ASSAY OF FERRITIN: CPT

## 2024-07-12 PROCEDURE — 1090F PRES/ABSN URINE INCON ASSESS: CPT | Performed by: INTERNAL MEDICINE

## 2024-07-12 PROCEDURE — G8417 CALC BMI ABV UP PARAM F/U: HCPCS | Performed by: INTERNAL MEDICINE

## 2024-07-12 PROCEDURE — G8399 PT W/DXA RESULTS DOCUMENT: HCPCS | Performed by: INTERNAL MEDICINE

## 2024-07-12 RX ORDER — TRAMADOL HYDROCHLORIDE 50 MG/1
50 TABLET ORAL EVERY 8 HOURS PRN
Qty: 21 TABLET | Refills: 0 | Status: SHIPPED | OUTPATIENT
Start: 2024-07-12 | End: 2024-07-19

## 2024-07-12 ASSESSMENT — PATIENT HEALTH QUESTIONNAIRE - PHQ9
2. FEELING DOWN, DEPRESSED OR HOPELESS: NOT AT ALL
SUM OF ALL RESPONSES TO PHQ QUESTIONS 1-9: 0
SUM OF ALL RESPONSES TO PHQ QUESTIONS 1-9: 0
1. LITTLE INTEREST OR PLEASURE IN DOING THINGS: NOT AT ALL
SUM OF ALL RESPONSES TO PHQ9 QUESTIONS 1 & 2: 0
SUM OF ALL RESPONSES TO PHQ QUESTIONS 1-9: 0
SUM OF ALL RESPONSES TO PHQ QUESTIONS 1-9: 0

## 2024-07-12 NOTE — PATIENT INSTRUCTIONS
Patient Information from Today's Visit    Diagnosis: Anemia      Follow Up Instructions:   - Labs reviewed  - No Retacrit today. Will space Retacrit out to every 6 weeks.   - Follow up with primary care regarding back pain/pain medication.  - Will send small supply of pain medication to your pharmacy but will need to follow up with primary care for future refills    Labs + Retacrit in 6 weeks  Follow up in 12 weeks with labs  Infusion after office visit for Retacrit      Treatment Summary has been discussed and given to patient: N/A      Current Labs:   Hospital Outpatient Visit on 07/12/2024   Component Date Value Ref Range Status    Ferritin 07/12/2024 491 (H)  8 - 388 NG/ML Final    Sodium 07/12/2024 140  136 - 145 mmol/L Final    Potassium 07/12/2024 4.0  3.5 - 5.1 mmol/L Final    Chloride 07/12/2024 103  98 - 107 mmol/L Final    CO2 07/12/2024 25  20 - 28 mmol/L Final    Anion Gap 07/12/2024 12  9 - 18 mmol/L Final    Glucose 07/12/2024 97  70 - 99 mg/dL Final    Comment: <70 mg/dL Consistent with, but not fully diagnostic of hypoglycemia.  100 - 125 mg/dL Impaired fasting glucose/consistent with pre-diabetes mellitus.  > 126 mg/dl Fasting glucose consistent with overt diabetes mellitus      BUN 07/12/2024 28 (H)  8 - 23 MG/DL Final    Creatinine 07/12/2024 1.71 (H)  0.60 - 1.10 MG/DL Final    Est, Glom Filt Rate 07/12/2024 30 (L)  >60 ml/min/1.73m2 Final    Comment:    Pediatric calculator link: https://www.kidney.org/professionals/kdoqi/gfr_calculatorped     These results are not intended for use in patients <18 years of age.     eGFR results are calculated without a race factor using  the 2021 CKD-EPI equation. Careful clinical correlation is recommended, particularly when comparing to results calculated using previous equations.  The CKD-EPI equation is less accurate in patients with extremes of muscle mass, extra-renal metabolism of creatinine, excessive creatine ingestion, or following therapy that

## 2024-07-26 RX ORDER — FAMOTIDINE 20 MG/1
20 TABLET, FILM COATED ORAL 2 TIMES DAILY
Qty: 60 TABLET | Refills: 3 | Status: SHIPPED | OUTPATIENT
Start: 2024-07-26

## 2024-08-12 PROBLEM — D63.1 ANEMIA IN CHRONIC KIDNEY DISEASE (CODE): Status: ACTIVE | Noted: 2024-08-12

## 2024-08-14 ENCOUNTER — HOSPITAL ENCOUNTER (OUTPATIENT)
Dept: LAB | Age: 79
Discharge: HOME | End: 2024-08-17
Payer: MEDICARE

## 2024-08-14 ENCOUNTER — HOSPITAL ENCOUNTER (OUTPATIENT)
Dept: INFUSION THERAPY | Age: 79
Setting detail: INFUSION SERIES
Discharge: HOME | End: 2024-08-14
Payer: MEDICARE

## 2024-08-14 VITALS
SYSTOLIC BLOOD PRESSURE: 131 MMHG | TEMPERATURE: 98.3 F | RESPIRATION RATE: 16 BRPM | HEART RATE: 67 BPM | DIASTOLIC BLOOD PRESSURE: 73 MMHG | OXYGEN SATURATION: 98 %

## 2024-08-14 DIAGNOSIS — N18.32 CHRONIC KIDNEY DISEASE, STAGE 3B (HCC): ICD-10-CM

## 2024-08-14 DIAGNOSIS — D64.9 ANEMIA, UNSPECIFIED TYPE: ICD-10-CM

## 2024-08-14 DIAGNOSIS — D63.1 ANEMIA IN CHRONIC KIDNEY DISEASE (CODE): Primary | ICD-10-CM

## 2024-08-14 LAB
BASOPHILS # BLD: 0.1 K/UL (ref 0–0.2)
BASOPHILS NFR BLD: 1 % (ref 0–2)
DIFFERENTIAL METHOD BLD: ABNORMAL
EOSINOPHIL # BLD: 0.1 K/UL (ref 0–0.8)
EOSINOPHIL NFR BLD: 3 % (ref 0.5–7.8)
ERYTHROCYTE [DISTWIDTH] IN BLOOD BY AUTOMATED COUNT: 14.4 % (ref 11.9–14.6)
HCT VFR BLD AUTO: 27 % (ref 35.8–46.3)
HGB BLD-MCNC: 8.5 G/DL (ref 11.7–15.4)
IMM GRANULOCYTES # BLD AUTO: 0 K/UL (ref 0–0.5)
IMM GRANULOCYTES NFR BLD AUTO: 1 % (ref 0–5)
LYMPHOCYTES # BLD: 1.5 K/UL (ref 0.5–4.6)
LYMPHOCYTES NFR BLD: 36 % (ref 13–44)
MCH RBC QN AUTO: 32 PG (ref 26.1–32.9)
MCHC RBC AUTO-ENTMCNC: 31.5 G/DL (ref 31.4–35)
MCV RBC AUTO: 101.5 FL (ref 82–102)
MONOCYTES # BLD: 0.3 K/UL (ref 0.1–1.3)
MONOCYTES NFR BLD: 7 % (ref 4–12)
NEUTS SEG # BLD: 2.2 K/UL (ref 1.7–8.2)
NEUTS SEG NFR BLD: 52 % (ref 43–78)
NRBC # BLD: 0 K/UL (ref 0–0.2)
PLATELET # BLD AUTO: 183 K/UL (ref 150–450)
PMV BLD AUTO: 10.1 FL (ref 9.4–12.3)
RBC # BLD AUTO: 2.66 M/UL (ref 4.05–5.2)
WBC # BLD AUTO: 4.2 K/UL (ref 4.3–11.1)

## 2024-08-14 PROCEDURE — 85025 COMPLETE CBC W/AUTO DIFF WBC: CPT

## 2024-08-14 PROCEDURE — 96372 THER/PROPH/DIAG INJ SC/IM: CPT

## 2024-08-14 PROCEDURE — 36415 COLL VENOUS BLD VENIPUNCTURE: CPT

## 2024-08-14 PROCEDURE — 6360000002 HC RX W HCPCS: Performed by: NURSE PRACTITIONER

## 2024-08-14 RX ORDER — DIPHENHYDRAMINE HYDROCHLORIDE 50 MG/ML
50 INJECTION INTRAMUSCULAR; INTRAVENOUS
OUTPATIENT
Start: 2024-08-14

## 2024-08-14 RX ORDER — SODIUM CHLORIDE 9 MG/ML
INJECTION, SOLUTION INTRAVENOUS CONTINUOUS
OUTPATIENT
Start: 2024-08-14

## 2024-08-14 RX ORDER — ALBUTEROL SULFATE 90 UG/1
4 AEROSOL, METERED RESPIRATORY (INHALATION) PRN
OUTPATIENT
Start: 2024-08-14

## 2024-08-14 RX ORDER — ONDANSETRON 2 MG/ML
4 INJECTION INTRAMUSCULAR; INTRAVENOUS ONCE
Start: 2024-08-14 | End: 2024-08-14

## 2024-08-14 RX ORDER — ACETAMINOPHEN 325 MG/1
650 TABLET ORAL
OUTPATIENT
Start: 2024-08-14

## 2024-08-14 RX ORDER — EPINEPHRINE 1 MG/ML
0.3 INJECTION, SOLUTION, CONCENTRATE INTRAVENOUS PRN
OUTPATIENT
Start: 2024-08-14

## 2024-08-14 RX ORDER — ONDANSETRON 2 MG/ML
4 INJECTION INTRAMUSCULAR; INTRAVENOUS ONCE
Status: COMPLETED | OUTPATIENT
Start: 2024-08-14 | End: 2024-08-14

## 2024-08-14 RX ORDER — ONDANSETRON 2 MG/ML
8 INJECTION INTRAMUSCULAR; INTRAVENOUS
OUTPATIENT
Start: 2024-08-14

## 2024-08-14 RX ADMIN — EPOETIN ALFA-EPBX 40000 UNITS: 40000 INJECTION, SOLUTION INTRAVENOUS; SUBCUTANEOUS at 15:17

## 2024-08-14 RX ADMIN — ONDANSETRON 4 MG: 2 INJECTION INTRAMUSCULAR; INTRAVENOUS at 15:19

## 2024-08-14 NOTE — PROGRESS NOTES
Arrived to the Infusion Center.  Retacrit & Zofran completed. Patient tolerated well.   Any issues or concerns during appointment: none.  Patient aware of next infusion appointment on 10/2/24 (date) at 1130 (time).  Patient instructed to call provider with temperature of 100.4 or greater or nausea/vomiting/ diarrhea or pain not controlled by medications  Discharged via ambulatory.

## 2024-08-20 RX ORDER — FAMOTIDINE 10 MG
10 TABLET ORAL 2 TIMES DAILY
Qty: 60 TABLET | Refills: 2 | Status: SHIPPED | OUTPATIENT
Start: 2024-08-20 | End: 2024-11-18

## 2024-08-21 ENCOUNTER — APPOINTMENT (OUTPATIENT)
Dept: INFUSION THERAPY | Age: 79
End: 2024-08-21
Payer: MEDICARE

## 2024-08-25 DIAGNOSIS — E78.5 HYPERLIPIDEMIA LDL GOAL <100: Primary | ICD-10-CM

## 2024-08-25 DIAGNOSIS — I10 ESSENTIAL HYPERTENSION: ICD-10-CM

## 2024-08-25 DIAGNOSIS — N18.32 CHRONIC KIDNEY DISEASE, STAGE 3B (HCC): ICD-10-CM

## 2024-08-30 ENCOUNTER — LAB (OUTPATIENT)
Dept: FAMILY MEDICINE CLINIC | Facility: CLINIC | Age: 79
End: 2024-08-30

## 2024-08-30 DIAGNOSIS — E78.5 HYPERLIPIDEMIA LDL GOAL <100: ICD-10-CM

## 2024-08-30 DIAGNOSIS — I10 ESSENTIAL HYPERTENSION: ICD-10-CM

## 2024-08-30 DIAGNOSIS — N18.32 CHRONIC KIDNEY DISEASE, STAGE 3B (HCC): ICD-10-CM

## 2024-08-31 LAB
ALBUMIN SERPL-MCNC: 3.8 G/DL (ref 3.2–4.6)
ALBUMIN/GLOB SERPL: 1 (ref 1–1.9)
ALP SERPL-CCNC: 52 U/L (ref 35–104)
ALT SERPL-CCNC: 9 U/L (ref 12–65)
ANION GAP SERPL CALC-SCNC: 11 MMOL/L (ref 9–18)
AST SERPL-CCNC: 31 U/L (ref 15–37)
BILIRUB SERPL-MCNC: 0.3 MG/DL (ref 0–1.2)
BUN SERPL-MCNC: 31 MG/DL (ref 8–23)
CALCIUM SERPL-MCNC: 9.3 MG/DL (ref 8.8–10.2)
CHLORIDE SERPL-SCNC: 103 MMOL/L (ref 98–107)
CHOLEST SERPL-MCNC: 273 MG/DL (ref 0–200)
CO2 SERPL-SCNC: 26 MMOL/L (ref 20–28)
CREAT SERPL-MCNC: 1.79 MG/DL (ref 0.6–1.1)
GLOBULIN SER CALC-MCNC: 3.7 G/DL (ref 2.3–3.5)
GLUCOSE SERPL-MCNC: 97 MG/DL (ref 70–99)
HDLC SERPL-MCNC: 51 MG/DL (ref 40–60)
HDLC SERPL: 5.3 (ref 0–5)
LDLC SERPL CALC-MCNC: 182 MG/DL (ref 0–100)
POTASSIUM SERPL-SCNC: 4.2 MMOL/L (ref 3.5–5.1)
PROT SERPL-MCNC: 7.4 G/DL (ref 6.3–8.2)
SODIUM SERPL-SCNC: 140 MMOL/L (ref 136–145)
TRIGL SERPL-MCNC: 199 MG/DL (ref 0–150)
VLDLC SERPL CALC-MCNC: 40 MG/DL (ref 6–23)

## 2024-09-11 ENCOUNTER — OFFICE VISIT (OUTPATIENT)
Age: 79
End: 2024-09-11
Payer: MEDICARE

## 2024-09-11 ENCOUNTER — OFFICE VISIT (OUTPATIENT)
Dept: FAMILY MEDICINE CLINIC | Facility: CLINIC | Age: 79
End: 2024-09-11
Payer: MEDICARE

## 2024-09-11 VITALS
BODY MASS INDEX: 31.83 KG/M2 | WEIGHT: 173 LBS | HEART RATE: 60 BPM | SYSTOLIC BLOOD PRESSURE: 118 MMHG | DIASTOLIC BLOOD PRESSURE: 72 MMHG | HEIGHT: 62 IN

## 2024-09-11 VITALS
HEART RATE: 75 BPM | DIASTOLIC BLOOD PRESSURE: 70 MMHG | OXYGEN SATURATION: 97 % | SYSTOLIC BLOOD PRESSURE: 122 MMHG | BODY MASS INDEX: 32.13 KG/M2 | HEIGHT: 62 IN | RESPIRATION RATE: 16 BRPM | WEIGHT: 174.6 LBS

## 2024-09-11 DIAGNOSIS — E78.5 HYPERLIPIDEMIA LDL GOAL <100: ICD-10-CM

## 2024-09-11 DIAGNOSIS — I10 ESSENTIAL HYPERTENSION: Primary | ICD-10-CM

## 2024-09-11 DIAGNOSIS — I44.7 LEFT BUNDLE BRANCH BLOCK: ICD-10-CM

## 2024-09-11 DIAGNOSIS — N18.32 CHRONIC KIDNEY DISEASE, STAGE 3B (HCC): ICD-10-CM

## 2024-09-11 DIAGNOSIS — I49.5 SICK SINUS SYNDROME (HCC): ICD-10-CM

## 2024-09-11 DIAGNOSIS — I10 ESSENTIAL (PRIMARY) HYPERTENSION: Primary | ICD-10-CM

## 2024-09-11 PROCEDURE — G8417 CALC BMI ABV UP PARAM F/U: HCPCS | Performed by: INTERNAL MEDICINE

## 2024-09-11 PROCEDURE — 3074F SYST BP LT 130 MM HG: CPT | Performed by: INTERNAL MEDICINE

## 2024-09-11 PROCEDURE — G8399 PT W/DXA RESULTS DOCUMENT: HCPCS | Performed by: FAMILY MEDICINE

## 2024-09-11 PROCEDURE — 1090F PRES/ABSN URINE INCON ASSESS: CPT | Performed by: INTERNAL MEDICINE

## 2024-09-11 PROCEDURE — G8428 CUR MEDS NOT DOCUMENT: HCPCS | Performed by: INTERNAL MEDICINE

## 2024-09-11 PROCEDURE — 1036F TOBACCO NON-USER: CPT | Performed by: FAMILY MEDICINE

## 2024-09-11 PROCEDURE — 1123F ACP DISCUSS/DSCN MKR DOCD: CPT | Performed by: FAMILY MEDICINE

## 2024-09-11 PROCEDURE — 3074F SYST BP LT 130 MM HG: CPT | Performed by: FAMILY MEDICINE

## 2024-09-11 PROCEDURE — 1090F PRES/ABSN URINE INCON ASSESS: CPT | Performed by: FAMILY MEDICINE

## 2024-09-11 PROCEDURE — 93000 ELECTROCARDIOGRAM COMPLETE: CPT | Performed by: INTERNAL MEDICINE

## 2024-09-11 PROCEDURE — G2211 COMPLEX E/M VISIT ADD ON: HCPCS | Performed by: FAMILY MEDICINE

## 2024-09-11 PROCEDURE — G8417 CALC BMI ABV UP PARAM F/U: HCPCS | Performed by: FAMILY MEDICINE

## 2024-09-11 PROCEDURE — 3078F DIAST BP <80 MM HG: CPT | Performed by: INTERNAL MEDICINE

## 2024-09-11 PROCEDURE — 99214 OFFICE O/P EST MOD 30 MIN: CPT | Performed by: FAMILY MEDICINE

## 2024-09-11 PROCEDURE — 3078F DIAST BP <80 MM HG: CPT | Performed by: FAMILY MEDICINE

## 2024-09-11 PROCEDURE — G8399 PT W/DXA RESULTS DOCUMENT: HCPCS | Performed by: INTERNAL MEDICINE

## 2024-09-11 PROCEDURE — 1036F TOBACCO NON-USER: CPT | Performed by: INTERNAL MEDICINE

## 2024-09-11 PROCEDURE — 99214 OFFICE O/P EST MOD 30 MIN: CPT | Performed by: INTERNAL MEDICINE

## 2024-09-11 PROCEDURE — G8427 DOCREV CUR MEDS BY ELIG CLIN: HCPCS | Performed by: FAMILY MEDICINE

## 2024-09-11 PROCEDURE — 1123F ACP DISCUSS/DSCN MKR DOCD: CPT | Performed by: INTERNAL MEDICINE

## 2024-09-11 RX ORDER — HYDRALAZINE HYDROCHLORIDE 25 MG/1
25 TABLET, FILM COATED ORAL 3 TIMES DAILY
Qty: 270 TABLET | Refills: 3 | Status: SHIPPED | OUTPATIENT
Start: 2024-09-11

## 2024-09-11 ASSESSMENT — ENCOUNTER SYMPTOMS
APHONIA: 0
BLOOD IN STOOL: 0
NAUSEA: 0
STRIDOR: 0
COUGH: 0
ABDOMINAL PAIN: 0
COUGH: 0
NAIL CHANGES: 0
EYE PAIN: 0
ABDOMINAL PAIN: 0
VOMITING: 0
SHORTNESS OF BREATH: 0

## 2024-09-16 RX ORDER — FAMOTIDINE 20 MG/1
20 TABLET, FILM COATED ORAL 2 TIMES DAILY
Qty: 60 TABLET | Refills: 3 | Status: SHIPPED | OUTPATIENT
Start: 2024-09-16

## 2024-10-02 ENCOUNTER — HOSPITAL ENCOUNTER (OUTPATIENT)
Dept: LAB | Age: 79
Discharge: HOME | End: 2024-10-02
Payer: MEDICARE

## 2024-10-02 ENCOUNTER — OFFICE VISIT (OUTPATIENT)
Dept: ONCOLOGY | Age: 79
End: 2024-10-02
Payer: MEDICARE

## 2024-10-02 ENCOUNTER — HOSPITAL ENCOUNTER (OUTPATIENT)
Dept: INFUSION THERAPY | Age: 79
Setting detail: INFUSION SERIES
Discharge: HOME | End: 2024-10-02
Payer: MEDICARE

## 2024-10-02 VITALS
BODY MASS INDEX: 32.02 KG/M2 | TEMPERATURE: 97.6 F | RESPIRATION RATE: 16 BRPM | WEIGHT: 174 LBS | DIASTOLIC BLOOD PRESSURE: 52 MMHG | HEIGHT: 62 IN | HEART RATE: 78 BPM | OXYGEN SATURATION: 99 % | SYSTOLIC BLOOD PRESSURE: 100 MMHG

## 2024-10-02 DIAGNOSIS — D64.9 ANEMIA, UNSPECIFIED TYPE: Primary | ICD-10-CM

## 2024-10-02 DIAGNOSIS — N18.32 CHRONIC KIDNEY DISEASE, STAGE 3B (HCC): ICD-10-CM

## 2024-10-02 DIAGNOSIS — D63.1 ANEMIA IN CHRONIC KIDNEY DISEASE (CODE): ICD-10-CM

## 2024-10-02 DIAGNOSIS — D64.9 ANEMIA, UNSPECIFIED TYPE: ICD-10-CM

## 2024-10-02 DIAGNOSIS — N18.30 STAGE 3 CHRONIC KIDNEY DISEASE, UNSPECIFIED WHETHER STAGE 3A OR 3B CKD (HCC): ICD-10-CM

## 2024-10-02 LAB
ALBUMIN SERPL-MCNC: 3.6 G/DL (ref 3.2–4.6)
ALBUMIN/GLOB SERPL: 0.9 (ref 1–1.9)
ALP SERPL-CCNC: 63 U/L (ref 35–104)
ALT SERPL-CCNC: 9 U/L (ref 8–45)
ANION GAP SERPL CALC-SCNC: 15 MMOL/L (ref 9–18)
AST SERPL-CCNC: 26 U/L (ref 15–37)
BASOPHILS # BLD: 0.1 K/UL (ref 0–0.2)
BASOPHILS NFR BLD: 1 % (ref 0–2)
BILIRUB SERPL-MCNC: 0.3 MG/DL (ref 0–1.2)
BUN SERPL-MCNC: 30 MG/DL (ref 8–23)
CALCIUM SERPL-MCNC: 9.5 MG/DL (ref 8.8–10.2)
CHLORIDE SERPL-SCNC: 105 MMOL/L (ref 98–107)
CO2 SERPL-SCNC: 23 MMOL/L (ref 20–28)
CREAT SERPL-MCNC: 1.74 MG/DL (ref 0.6–1.1)
DIFFERENTIAL METHOD BLD: ABNORMAL
EOSINOPHIL # BLD: 0.1 K/UL (ref 0–0.8)
EOSINOPHIL NFR BLD: 3 % (ref 0.5–7.8)
ERYTHROCYTE [DISTWIDTH] IN BLOOD BY AUTOMATED COUNT: 13.3 % (ref 11.9–14.6)
FERRITIN SERPL-MCNC: 354 NG/ML (ref 8–388)
GLOBULIN SER CALC-MCNC: 4.1 G/DL (ref 2.3–3.5)
GLUCOSE SERPL-MCNC: 106 MG/DL (ref 70–99)
HCT VFR BLD AUTO: 30 % (ref 35.8–46.3)
HGB BLD-MCNC: 9.6 G/DL (ref 11.7–15.4)
IMM GRANULOCYTES # BLD AUTO: 0 K/UL (ref 0–0.5)
IMM GRANULOCYTES NFR BLD AUTO: 1 % (ref 0–5)
LYMPHOCYTES # BLD: 1.5 K/UL (ref 0.5–4.6)
LYMPHOCYTES NFR BLD: 34 % (ref 13–44)
MCH RBC QN AUTO: 32.1 PG (ref 26.1–32.9)
MCHC RBC AUTO-ENTMCNC: 32 G/DL (ref 31.4–35)
MCV RBC AUTO: 100.3 FL (ref 82–102)
MONOCYTES # BLD: 0.3 K/UL (ref 0.1–1.3)
MONOCYTES NFR BLD: 6 % (ref 4–12)
NEUTS SEG # BLD: 2.3 K/UL (ref 1.7–8.2)
NEUTS SEG NFR BLD: 55 % (ref 43–78)
NRBC # BLD: 0 K/UL (ref 0–0.2)
PLATELET # BLD AUTO: 175 K/UL (ref 150–450)
PMV BLD AUTO: 9.5 FL (ref 9.4–12.3)
POTASSIUM SERPL-SCNC: 4 MMOL/L (ref 3.5–5.1)
PROT SERPL-MCNC: 7.7 G/DL (ref 6.3–8.2)
RBC # BLD AUTO: 2.99 M/UL (ref 4.05–5.2)
SODIUM SERPL-SCNC: 143 MMOL/L (ref 136–145)
WBC # BLD AUTO: 4.3 K/UL (ref 4.3–11.1)

## 2024-10-02 PROCEDURE — 80053 COMPREHEN METABOLIC PANEL: CPT

## 2024-10-02 PROCEDURE — 1036F TOBACCO NON-USER: CPT | Performed by: NURSE PRACTITIONER

## 2024-10-02 PROCEDURE — 96372 THER/PROPH/DIAG INJ SC/IM: CPT

## 2024-10-02 PROCEDURE — 85025 COMPLETE CBC W/AUTO DIFF WBC: CPT

## 2024-10-02 PROCEDURE — G8417 CALC BMI ABV UP PARAM F/U: HCPCS | Performed by: NURSE PRACTITIONER

## 2024-10-02 PROCEDURE — 1090F PRES/ABSN URINE INCON ASSESS: CPT | Performed by: NURSE PRACTITIONER

## 2024-10-02 PROCEDURE — G8427 DOCREV CUR MEDS BY ELIG CLIN: HCPCS | Performed by: NURSE PRACTITIONER

## 2024-10-02 PROCEDURE — G8399 PT W/DXA RESULTS DOCUMENT: HCPCS | Performed by: NURSE PRACTITIONER

## 2024-10-02 PROCEDURE — 3074F SYST BP LT 130 MM HG: CPT | Performed by: NURSE PRACTITIONER

## 2024-10-02 PROCEDURE — 6360000002 HC RX W HCPCS: Performed by: NURSE PRACTITIONER

## 2024-10-02 PROCEDURE — 1123F ACP DISCUSS/DSCN MKR DOCD: CPT | Performed by: NURSE PRACTITIONER

## 2024-10-02 PROCEDURE — 36415 COLL VENOUS BLD VENIPUNCTURE: CPT

## 2024-10-02 PROCEDURE — 3078F DIAST BP <80 MM HG: CPT | Performed by: NURSE PRACTITIONER

## 2024-10-02 PROCEDURE — 99214 OFFICE O/P EST MOD 30 MIN: CPT | Performed by: NURSE PRACTITIONER

## 2024-10-02 PROCEDURE — 82728 ASSAY OF FERRITIN: CPT

## 2024-10-02 PROCEDURE — G8484 FLU IMMUNIZE NO ADMIN: HCPCS | Performed by: NURSE PRACTITIONER

## 2024-10-02 RX ORDER — EPINEPHRINE 1 MG/ML
0.3 INJECTION, SOLUTION, CONCENTRATE INTRAVENOUS PRN
OUTPATIENT
Start: 2024-10-02

## 2024-10-02 RX ORDER — ACETAMINOPHEN 325 MG/1
650 TABLET ORAL
OUTPATIENT
Start: 2024-10-02

## 2024-10-02 RX ORDER — ONDANSETRON 2 MG/ML
4 INJECTION INTRAMUSCULAR; INTRAVENOUS ONCE
Start: 2024-10-02 | End: 2024-10-02

## 2024-10-02 RX ORDER — ONDANSETRON 2 MG/ML
8 INJECTION INTRAMUSCULAR; INTRAVENOUS
OUTPATIENT
Start: 2024-10-02

## 2024-10-02 RX ORDER — SODIUM CHLORIDE 9 MG/ML
INJECTION, SOLUTION INTRAVENOUS CONTINUOUS
OUTPATIENT
Start: 2024-10-02

## 2024-10-02 RX ORDER — ONDANSETRON 2 MG/ML
4 INJECTION INTRAMUSCULAR; INTRAVENOUS ONCE
Status: COMPLETED | OUTPATIENT
Start: 2024-10-02 | End: 2024-10-02

## 2024-10-02 RX ORDER — ALBUTEROL SULFATE 90 UG/1
4 INHALANT RESPIRATORY (INHALATION) PRN
OUTPATIENT
Start: 2024-10-02

## 2024-10-02 RX ORDER — DIPHENHYDRAMINE HYDROCHLORIDE 50 MG/ML
50 INJECTION INTRAMUSCULAR; INTRAVENOUS
OUTPATIENT
Start: 2024-10-02

## 2024-10-02 RX ADMIN — EPOETIN ALFA-EPBX 40000 UNITS: 40000 INJECTION, SOLUTION INTRAVENOUS; SUBCUTANEOUS at 10:49

## 2024-10-02 RX ADMIN — ONDANSETRON 4 MG: 2 INJECTION INTRAMUSCULAR; INTRAVENOUS at 10:49

## 2024-10-02 ASSESSMENT — PATIENT HEALTH QUESTIONNAIRE - PHQ9
SUM OF ALL RESPONSES TO PHQ QUESTIONS 1-9: 0
SUM OF ALL RESPONSES TO PHQ9 QUESTIONS 1 & 2: 0
SUM OF ALL RESPONSES TO PHQ QUESTIONS 1-9: 0
SUM OF ALL RESPONSES TO PHQ QUESTIONS 1-9: 0
2. FEELING DOWN, DEPRESSED OR HOPELESS: NOT AT ALL
SUM OF ALL RESPONSES TO PHQ QUESTIONS 1-9: 0

## 2024-10-02 NOTE — PROGRESS NOTES
Arrived to the Infusion Center.  Zofran and Retacit injections completed.   Patient instructed to report any side affects to ordering provider.  Patient tolerated well.   Any issues or concerns during appointment: none.  Patient aware of next infusion appointment on 11/13/2024 (date) at 1015 (time).  Discharged ambulatory.

## 2024-10-02 NOTE — PROGRESS NOTES
oropharynx  Cardiovascular:RRR, S1, S2 present, + systolic murmur  Lungs: Clear to auscultation, no rales or wheezing, no accessory muscle use  Abdomen: Soft, and non-tender on palpation, no organomegaly, bowel sounds audible  Extremities: No peripheral edema, no joint swelling  Neurological: patient can follow commands and move all extremities    Labs:  Lab Results   Component Value Date    WBC 4.3 10/02/2024    HGB 9.6 (L) 10/02/2024    HCT 30.0 (L) 10/02/2024    .3 10/02/2024     10/02/2024     Lab Results   Component Value Date    NEUTROABS 2.3 10/02/2024    LYMPHOPCT 34 10/02/2024    LYMPHSABS 1.5 10/02/2024    MONOPCT 6 10/02/2024    MONOSABS 0.3 10/02/2024    EOSPCT 3 10/02/2024    EOSABS 0.1 10/02/2024    BASOPCT 1 10/02/2024     Lab Results   Component Value Date     08/30/2024    K 4.2 08/30/2024     08/30/2024    CO2 26 08/30/2024    BUN 31 (H) 08/30/2024    CREATININE 1.79 (H) 08/30/2024    GLUCOSE 97 08/30/2024    CALCIUM 9.3 08/30/2024    LABALBU 44.6 09/23/2022    BILITOT 0.3 08/30/2024    ALKPHOS 52 08/30/2024    AST 31 08/30/2024    ALT 9 (L) 08/30/2024    LABGLOM 28 (L) 08/30/2024    GFRAA 47 (L) 09/23/2022    AGRATIO 1.3 02/18/2022    GLOB 3.7 (H) 08/30/2024     Lab Results   Component Value Date    IRON 62 10/20/2023    TIBC 261 10/20/2023    FERRITIN 491 (H) 07/12/2024         Polyclonal increase detected in 1 or more immunoglobulins on SPEP  Kappa lambda ratio normal-1.41  UPEP/HELLEN unremarkable  No evidence of hemolysis on labs.  Methylmalonic acid level normal, homocystine elevated-pattern suggestive of folate deficiency     BONE MARROW SYNOPTIC REPORT     SPECIMEN:  Peripheral blood smear, bone marrow aspiration, bone marrow   aspirate clot, bone marrow core biopsy, bone marrow core touch   preparation.     PROCEDURE:  Aspiration, biopsy.   ASPIRATION SITE:  Posterior iliac crest.   BIOPSY SITE:  Posterior iliac crest.   HISTOLOGIC TYPE:  No definitive evidence of a

## 2024-11-13 ENCOUNTER — HOSPITAL ENCOUNTER (OUTPATIENT)
Dept: INFUSION THERAPY | Age: 79
Setting detail: INFUSION SERIES
Discharge: HOME | End: 2024-11-13

## 2024-11-13 ENCOUNTER — HOSPITAL ENCOUNTER (OUTPATIENT)
Dept: LAB | Age: 79
Discharge: HOME | End: 2024-11-13
Payer: MEDICARE

## 2024-11-13 VITALS
DIASTOLIC BLOOD PRESSURE: 74 MMHG | SYSTOLIC BLOOD PRESSURE: 128 MMHG | OXYGEN SATURATION: 96 % | HEART RATE: 72 BPM | RESPIRATION RATE: 18 BRPM | TEMPERATURE: 98.2 F

## 2024-11-13 DIAGNOSIS — D64.9 ANEMIA, UNSPECIFIED TYPE: ICD-10-CM

## 2024-11-13 LAB
BASOPHILS # BLD: 0.1 K/UL (ref 0–0.2)
BASOPHILS NFR BLD: 2 % (ref 0–2)
DIFFERENTIAL METHOD BLD: ABNORMAL
EOSINOPHIL # BLD: 0.2 K/UL (ref 0–0.8)
EOSINOPHIL NFR BLD: 4 % (ref 0.5–7.8)
ERYTHROCYTE [DISTWIDTH] IN BLOOD BY AUTOMATED COUNT: 13.7 % (ref 11.9–14.6)
HCT VFR BLD AUTO: 32.1 % (ref 35.8–46.3)
HGB BLD-MCNC: 10.6 G/DL (ref 11.7–15.4)
IMM GRANULOCYTES # BLD AUTO: 0.1 K/UL (ref 0–0.5)
IMM GRANULOCYTES NFR BLD AUTO: 1 % (ref 0–5)
LYMPHOCYTES # BLD: 1.8 K/UL (ref 0.5–4.6)
LYMPHOCYTES NFR BLD: 35 % (ref 13–44)
MCH RBC QN AUTO: 31.8 PG (ref 26.1–32.9)
MCHC RBC AUTO-ENTMCNC: 33 G/DL (ref 31.4–35)
MCV RBC AUTO: 96.4 FL (ref 82–102)
MONOCYTES # BLD: 0.3 K/UL (ref 0.1–1.3)
MONOCYTES NFR BLD: 6 % (ref 4–12)
NEUTS SEG # BLD: 2.8 K/UL (ref 1.7–8.2)
NEUTS SEG NFR BLD: 52 % (ref 43–78)
NRBC # BLD: 0 K/UL (ref 0–0.2)
PLATELET # BLD AUTO: 244 K/UL (ref 150–450)
PMV BLD AUTO: 9.6 FL (ref 9.4–12.3)
RBC # BLD AUTO: 3.33 M/UL (ref 4.05–5.2)
WBC # BLD AUTO: 5.3 K/UL (ref 4.3–11.1)

## 2024-11-13 PROCEDURE — 85025 COMPLETE CBC W/AUTO DIFF WBC: CPT

## 2024-11-13 PROCEDURE — 36415 COLL VENOUS BLD VENIPUNCTURE: CPT

## 2024-11-13 NOTE — PROGRESS NOTES
Patient arrived to infusion for Retacrit injection. Labs reviewed--hemoglobin 10.6. Per MD order, patient does not meet order parameters (hold if hemoglobin >10.0).

## 2024-12-24 DIAGNOSIS — D64.9 ANEMIA, UNSPECIFIED TYPE: Primary | ICD-10-CM

## 2024-12-24 DIAGNOSIS — N18.30 STAGE 3 CHRONIC KIDNEY DISEASE, UNSPECIFIED WHETHER STAGE 3A OR 3B CKD (HCC): ICD-10-CM

## 2025-01-07 NOTE — PROGRESS NOTES
[facility]: Office Visit Established Patient    Reason for follow up:    Normocytic anemia    Overview: (copied from prior)    Ms. Hummel is a 77 years old female patient with history of hypertension, hyperlipidemia, chronic kidney disease, colon polyps, total abdominal hysterectomy with bilateral salpingo-oophorectomy and spontaneous pneumothorax who has been referred for evaluation of microcytic anemia.    On 8/26/202022, she presented to her PCP with complaints of fatigue and shortness of breath with activity.  Hemoglobin was down to 8.3 from 11 (in March 2022).     On evaluation today, patient reports moderate persistent fatigue and exertional dyspnea with onset a few months ago.  She denies fevers, night sweats, chest pain, bloody/black stools, nausea, vomiting, change in appetite, heartburn, headache, gait disturbances, tingling/numbness or weakness in any part of her body.  She denies any swollen glands easy bleeding or bruising.  She has intentionally lost about 24 pounds over the course of last few months by following healthier eating habits.     She is scheduled to undergo EGD and colonoscopy on 10/19/2022.  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  EGD showed gastritis determined by biopsy.      Interval history:  History of Present Illness  The patient is an 79-year-old female who presents for a follow-up regarding anemia, which is deemed to be anemia of chronic disease related to chronic kidney disease. She has been treated with Retacrit injections for hemoglobin less than 10. She presents in follow-up and review of labs.    She reports a general sense of well-being, with no significant health issues since her last visit. She has not experienced any episodes of bleeding, bruising, nausea, vomiting, dizziness, chest pain, or shortness of breath. However, she does acknowledge feeling slightly fatigued. She has not required a Retacrit

## 2025-01-08 ENCOUNTER — HOSPITAL ENCOUNTER (OUTPATIENT)
Dept: LAB | Age: 80
Discharge: HOME | End: 2025-01-08
Payer: MEDICARE

## 2025-01-08 ENCOUNTER — OFFICE VISIT (OUTPATIENT)
Dept: ONCOLOGY | Age: 80
End: 2025-01-08

## 2025-01-08 VITALS
HEART RATE: 70 BPM | WEIGHT: 177.2 LBS | BODY MASS INDEX: 32.61 KG/M2 | RESPIRATION RATE: 16 BRPM | SYSTOLIC BLOOD PRESSURE: 139 MMHG | TEMPERATURE: 97.6 F | OXYGEN SATURATION: 98 % | HEIGHT: 62 IN | DIASTOLIC BLOOD PRESSURE: 62 MMHG

## 2025-01-08 DIAGNOSIS — N18.30 STAGE 3 CHRONIC KIDNEY DISEASE, UNSPECIFIED WHETHER STAGE 3A OR 3B CKD (HCC): ICD-10-CM

## 2025-01-08 DIAGNOSIS — D64.9 ANEMIA, UNSPECIFIED TYPE: Primary | ICD-10-CM

## 2025-01-08 DIAGNOSIS — D64.9 ANEMIA, UNSPECIFIED TYPE: ICD-10-CM

## 2025-01-08 LAB
ALBUMIN SERPL-MCNC: 3.6 G/DL (ref 3.2–4.6)
ALBUMIN/GLOB SERPL: 0.8 (ref 1–1.9)
ALP SERPL-CCNC: 69 U/L (ref 35–104)
ALT SERPL-CCNC: 7 U/L (ref 8–45)
ANION GAP SERPL CALC-SCNC: 11 MMOL/L (ref 7–16)
AST SERPL-CCNC: 27 U/L (ref 15–37)
BASOPHILS # BLD: 0.07 K/UL (ref 0–0.2)
BASOPHILS NFR BLD: 1.3 % (ref 0–2)
BILIRUB SERPL-MCNC: 0.2 MG/DL (ref 0–1.2)
BUN SERPL-MCNC: 22 MG/DL (ref 8–23)
CALCIUM SERPL-MCNC: 9.5 MG/DL (ref 8.8–10.2)
CHLORIDE SERPL-SCNC: 108 MMOL/L (ref 98–107)
CO2 SERPL-SCNC: 23 MMOL/L (ref 20–29)
CREAT SERPL-MCNC: 1.65 MG/DL (ref 0.6–1.1)
DIFFERENTIAL METHOD BLD: ABNORMAL
EOSINOPHIL # BLD: 0.12 K/UL (ref 0–0.8)
EOSINOPHIL NFR BLD: 2.3 % (ref 0.5–7.8)
ERYTHROCYTE [DISTWIDTH] IN BLOOD BY AUTOMATED COUNT: 14.5 % (ref 11.9–14.6)
FERRITIN SERPL-MCNC: 426 NG/ML (ref 8–388)
GLOBULIN SER CALC-MCNC: 4.3 G/DL (ref 2.3–3.5)
GLUCOSE SERPL-MCNC: 90 MG/DL (ref 70–99)
HCT VFR BLD AUTO: 33.1 % (ref 35.8–46.3)
HGB BLD-MCNC: 10.5 G/DL (ref 11.7–15.4)
IMM GRANULOCYTES # BLD AUTO: 0.02 K/UL (ref 0–0.5)
IMM GRANULOCYTES NFR BLD AUTO: 0.4 % (ref 0–5)
IRON SATN MFR SERPL: 27 % (ref 20–50)
IRON SERPL-MCNC: 64 UG/DL (ref 35–100)
LYMPHOCYTES # BLD: 1.85 K/UL (ref 0.5–4.6)
LYMPHOCYTES NFR BLD: 35.4 % (ref 13–44)
MCH RBC QN AUTO: 31.5 PG (ref 26.1–32.9)
MCHC RBC AUTO-ENTMCNC: 31.7 G/DL (ref 31.4–35)
MCV RBC AUTO: 99.4 FL (ref 82–102)
MONOCYTES # BLD: 0.27 K/UL (ref 0.1–1.3)
MONOCYTES NFR BLD: 5.2 % (ref 4–12)
NEUTS SEG # BLD: 2.89 K/UL (ref 1.7–8.2)
NEUTS SEG NFR BLD: 55.4 % (ref 43–78)
NRBC # BLD: 0 K/UL (ref 0–0.2)
PLATELET # BLD AUTO: 198 K/UL (ref 150–450)
PMV BLD AUTO: 10 FL (ref 9.4–12.3)
POTASSIUM SERPL-SCNC: 3.6 MMOL/L (ref 3.5–5.1)
PROT SERPL-MCNC: 7.9 G/DL (ref 6.3–8.2)
RBC # BLD AUTO: 3.33 M/UL (ref 4.05–5.2)
SODIUM SERPL-SCNC: 142 MMOL/L (ref 136–145)
TIBC SERPL-MCNC: 241 UG/DL (ref 240–450)
UIBC SERPL-MCNC: 177 UG/DL (ref 112–347)
WBC # BLD AUTO: 5.2 K/UL (ref 4.3–11.1)

## 2025-01-08 PROCEDURE — 36415 COLL VENOUS BLD VENIPUNCTURE: CPT

## 2025-01-08 PROCEDURE — 83550 IRON BINDING TEST: CPT

## 2025-01-08 PROCEDURE — 82728 ASSAY OF FERRITIN: CPT

## 2025-01-08 PROCEDURE — 80053 COMPREHEN METABOLIC PANEL: CPT

## 2025-01-08 PROCEDURE — 85025 COMPLETE CBC W/AUTO DIFF WBC: CPT

## 2025-01-08 PROCEDURE — 83540 ASSAY OF IRON: CPT

## 2025-01-08 NOTE — PATIENT INSTRUCTIONS
Patient Information from Today's Visit    Diagnosis: Anemia      Follow Up Instructions:   - Labs reviewed  - Retacrit is not needed        Treatment Summary has been discussed and given to patient: N/A      Current Labs:   Hospital Outpatient Visit on 01/08/2025   Component Date Value Ref Range Status    WBC 01/08/2025 5.2  4.3 - 11.1 K/uL Final    RBC 01/08/2025 3.33 (L)  4.05 - 5.2 M/uL Final    Hemoglobin 01/08/2025 10.5 (L)  11.7 - 15.4 g/dL Final    Hematocrit 01/08/2025 33.1 (L)  35.8 - 46.3 % Final    MCV 01/08/2025 99.4  82.0 - 102.0 FL Final    MCH 01/08/2025 31.5  26.1 - 32.9 PG Final    MCHC 01/08/2025 31.7  31.4 - 35.0 g/dL Final    RDW 01/08/2025 14.5  11.9 - 14.6 % Final    Platelets 01/08/2025 198  150 - 450 K/uL Final    MPV 01/08/2025 10.0  9.4 - 12.3 FL Final    nRBC 01/08/2025 0.00  0.0 - 0.2 K/uL Final    **Note: Absolute NRBC parameter is now reported with Hemogram**    Neutrophils % 01/08/2025 55.4  43.0 - 78.0 % Final    Lymphocytes % 01/08/2025 35.4  13.0 - 44.0 % Final    Monocytes % 01/08/2025 5.2  4.0 - 12.0 % Final    Eosinophils % 01/08/2025 2.3  0.5 - 7.8 % Final    Basophils % 01/08/2025 1.3  0.0 - 2.0 % Final    Immature Granulocytes % 01/08/2025 0.4  0.0 - 5.0 % Final    Neutrophils Absolute 01/08/2025 2.89  1.70 - 8.20 K/UL Final    Lymphocytes Absolute 01/08/2025 1.85  0.50 - 4.60 K/UL Final    Monocytes Absolute 01/08/2025 0.27  0.10 - 1.30 K/UL Final    Eosinophils Absolute 01/08/2025 0.12  0.00 - 0.80 K/UL Final    Basophils Absolute 01/08/2025 0.07  0.00 - 0.20 K/UL Final    Immature Granulocytes Absolute 01/08/2025 0.02  0.00 - 0.50 K/UL Final    Differential Type 01/08/2025 AUTOMATED    Final                 Please refer to After Visit Summary or [employer] for upcoming appointment information. If you have any questions regarding your upcoming schedule please reach out to your care team through [employer] or call [phone].    Please notify your assigned Nurse Navigator of any

## 2025-02-05 RX ORDER — FAMOTIDINE 20 MG/1
20 TABLET, FILM COATED ORAL 2 TIMES DAILY
Qty: 60 TABLET | Refills: 3 | Status: SHIPPED | OUTPATIENT
Start: 2025-02-05

## 2025-02-10 DIAGNOSIS — E78.5 HYPERLIPIDEMIA LDL GOAL <100: ICD-10-CM

## 2025-02-11 RX ORDER — EZETIMIBE 10 MG/1
TABLET ORAL
Qty: 90 TABLET | Refills: 0 | Status: SHIPPED | OUTPATIENT
Start: 2025-02-11

## 2025-02-11 RX ORDER — SIMVASTATIN 40 MG
TABLET ORAL
Qty: 90 TABLET | Refills: 0 | Status: SHIPPED | OUTPATIENT
Start: 2025-02-11

## 2025-02-17 ENCOUNTER — PATIENT MESSAGE (OUTPATIENT)
Age: 80
End: 2025-02-17

## 2025-02-17 DIAGNOSIS — I10 ESSENTIAL HYPERTENSION: Primary | ICD-10-CM

## 2025-02-18 RX ORDER — HYDRALAZINE HYDROCHLORIDE 25 MG/1
25 TABLET, FILM COATED ORAL 3 TIMES DAILY
Qty: 270 TABLET | Refills: 2 | Status: SHIPPED | OUTPATIENT
Start: 2025-02-18

## 2025-02-18 NOTE — TELEPHONE ENCOUNTER
Requested Prescriptions     Pending Prescriptions Disp Refills    hydrALAZINE (APRESOLINE) 25 MG tablet 270 tablet 2     Sig: Take 1 tablet by mouth 3 times daily       
normal (ped)...

## 2025-02-19 ENCOUNTER — HOSPITAL ENCOUNTER (OUTPATIENT)
Dept: INFUSION THERAPY | Age: 80
Setting detail: INFUSION SERIES
Discharge: HOME | End: 2025-02-19

## 2025-02-19 ENCOUNTER — HOSPITAL ENCOUNTER (OUTPATIENT)
Dept: LAB | Age: 80
Discharge: HOME | End: 2025-02-19
Payer: MEDICARE

## 2025-02-19 DIAGNOSIS — D64.9 ANEMIA, UNSPECIFIED TYPE: ICD-10-CM

## 2025-02-19 LAB
BASOPHILS # BLD: 0.09 K/UL (ref 0–0.2)
BASOPHILS NFR BLD: 1.6 % (ref 0–2)
DIFFERENTIAL METHOD BLD: ABNORMAL
EOSINOPHIL # BLD: 0.17 K/UL (ref 0–0.8)
EOSINOPHIL NFR BLD: 3 % (ref 0.5–7.8)
ERYTHROCYTE [DISTWIDTH] IN BLOOD BY AUTOMATED COUNT: 14 % (ref 11.9–14.6)
HCT VFR BLD AUTO: 32 % (ref 35.8–46.3)
HGB BLD-MCNC: 10.4 G/DL (ref 11.7–15.4)
IMM GRANULOCYTES # BLD AUTO: 0.03 K/UL (ref 0–0.5)
IMM GRANULOCYTES NFR BLD AUTO: 0.5 % (ref 0–5)
LYMPHOCYTES # BLD: 1.81 K/UL (ref 0.5–4.6)
LYMPHOCYTES NFR BLD: 31.8 % (ref 13–44)
MCH RBC QN AUTO: 32.2 PG (ref 26.1–32.9)
MCHC RBC AUTO-ENTMCNC: 32.5 G/DL (ref 31.4–35)
MCV RBC AUTO: 99.1 FL (ref 82–102)
MONOCYTES # BLD: 0.27 K/UL (ref 0.1–1.3)
MONOCYTES NFR BLD: 4.7 % (ref 4–12)
NEUTS SEG # BLD: 3.32 K/UL (ref 1.7–8.2)
NEUTS SEG NFR BLD: 58.4 % (ref 43–78)
NRBC # BLD: 0 K/UL (ref 0–0.2)
PLATELET # BLD AUTO: 202 K/UL (ref 150–450)
PMV BLD AUTO: 10.2 FL (ref 9.4–12.3)
RBC # BLD AUTO: 3.23 M/UL (ref 4.05–5.2)
WBC # BLD AUTO: 5.7 K/UL (ref 4.3–11.1)

## 2025-02-19 PROCEDURE — 85025 COMPLETE CBC W/AUTO DIFF WBC: CPT

## 2025-02-19 PROCEDURE — 36415 COLL VENOUS BLD VENIPUNCTURE: CPT

## 2025-02-28 DIAGNOSIS — E78.5 HYPERLIPIDEMIA LDL GOAL <100: Primary | ICD-10-CM

## 2025-03-07 ENCOUNTER — LAB (OUTPATIENT)
Dept: FAMILY MEDICINE CLINIC | Facility: CLINIC | Age: 80
End: 2025-03-07

## 2025-03-07 DIAGNOSIS — E78.5 HYPERLIPIDEMIA LDL GOAL <100: ICD-10-CM

## 2025-03-07 LAB
CHOLEST SERPL-MCNC: 163 MG/DL (ref 0–200)
HDLC SERPL-MCNC: 46 MG/DL (ref 40–60)
HDLC SERPL: 3.5 (ref 0–5)
LDLC SERPL CALC-MCNC: 78 MG/DL (ref 0–100)
TRIGL SERPL-MCNC: 194 MG/DL (ref 0–150)
VLDLC SERPL CALC-MCNC: 39 MG/DL (ref 6–23)

## 2025-03-12 SDOH — ECONOMIC STABILITY: FOOD INSECURITY: WITHIN THE PAST 12 MONTHS, YOU WORRIED THAT YOUR FOOD WOULD RUN OUT BEFORE YOU GOT MONEY TO BUY MORE.: NEVER TRUE

## 2025-03-12 SDOH — ECONOMIC STABILITY: INCOME INSECURITY: IN THE LAST 12 MONTHS, WAS THERE A TIME WHEN YOU WERE NOT ABLE TO PAY THE MORTGAGE OR RENT ON TIME?: NO

## 2025-03-12 SDOH — ECONOMIC STABILITY: TRANSPORTATION INSECURITY
IN THE PAST 12 MONTHS, HAS THE LACK OF TRANSPORTATION KEPT YOU FROM MEDICAL APPOINTMENTS OR FROM GETTING MEDICATIONS?: NO

## 2025-03-12 SDOH — ECONOMIC STABILITY: FOOD INSECURITY: WITHIN THE PAST 12 MONTHS, THE FOOD YOU BOUGHT JUST DIDN'T LAST AND YOU DIDN'T HAVE MONEY TO GET MORE.: NEVER TRUE

## 2025-03-12 SDOH — ECONOMIC STABILITY: TRANSPORTATION INSECURITY
IN THE PAST 12 MONTHS, HAS LACK OF TRANSPORTATION KEPT YOU FROM MEETINGS, WORK, OR FROM GETTING THINGS NEEDED FOR DAILY LIVING?: NO

## 2025-03-14 ENCOUNTER — HOSPITAL ENCOUNTER (OUTPATIENT)
Dept: GENERAL RADIOLOGY | Age: 80
Discharge: HOME | End: 2025-03-16
Payer: MEDICARE

## 2025-03-14 ENCOUNTER — OFFICE VISIT (OUTPATIENT)
Dept: FAMILY MEDICINE CLINIC | Facility: CLINIC | Age: 80
End: 2025-03-14

## 2025-03-14 ENCOUNTER — RESULTS FOLLOW-UP (OUTPATIENT)
Dept: FAMILY MEDICINE CLINIC | Facility: CLINIC | Age: 80
End: 2025-03-14

## 2025-03-14 VITALS
SYSTOLIC BLOOD PRESSURE: 118 MMHG | DIASTOLIC BLOOD PRESSURE: 74 MMHG | HEIGHT: 62 IN | WEIGHT: 178.4 LBS | BODY MASS INDEX: 32.83 KG/M2 | OXYGEN SATURATION: 98 % | RESPIRATION RATE: 16 BRPM | HEART RATE: 71 BPM

## 2025-03-14 DIAGNOSIS — I49.5 SICK SINUS SYNDROME (HCC): ICD-10-CM

## 2025-03-14 DIAGNOSIS — R79.89 ELEVATED BRAIN NATRIURETIC PEPTIDE (BNP) LEVEL: Primary | ICD-10-CM

## 2025-03-14 DIAGNOSIS — E78.5 HYPERLIPIDEMIA LDL GOAL <100: ICD-10-CM

## 2025-03-14 DIAGNOSIS — R06.02 SHORTNESS OF BREATH ON EXERTION: ICD-10-CM

## 2025-03-14 DIAGNOSIS — Z00.00 MEDICARE ANNUAL WELLNESS VISIT, SUBSEQUENT: Primary | ICD-10-CM

## 2025-03-14 DIAGNOSIS — I10 ESSENTIAL HYPERTENSION: ICD-10-CM

## 2025-03-14 DIAGNOSIS — N18.32 CHRONIC KIDNEY DISEASE, STAGE 3B (HCC): ICD-10-CM

## 2025-03-14 LAB
BASOPHILS # BLD: 0.07 K/UL (ref 0–0.2)
BASOPHILS NFR BLD: 1.3 % (ref 0–2)
DIFFERENTIAL METHOD BLD: ABNORMAL
EOSINOPHIL # BLD: 0.16 K/UL (ref 0–0.8)
EOSINOPHIL NFR BLD: 3.1 % (ref 0.5–7.8)
ERYTHROCYTE [DISTWIDTH] IN BLOOD BY AUTOMATED COUNT: 13.9 % (ref 11.9–14.6)
HCT VFR BLD AUTO: 30.6 % (ref 35.8–46.3)
HGB BLD-MCNC: 10 G/DL (ref 11.7–15.4)
IMM GRANULOCYTES # BLD AUTO: 0.03 K/UL (ref 0–0.5)
IMM GRANULOCYTES NFR BLD AUTO: 0.6 % (ref 0–5)
LYMPHOCYTES # BLD: 1.99 K/UL (ref 0.5–4.6)
LYMPHOCYTES NFR BLD: 38 % (ref 13–44)
MCH RBC QN AUTO: 32.7 PG (ref 26.1–32.9)
MCHC RBC AUTO-ENTMCNC: 32.7 G/DL (ref 31.4–35)
MCV RBC AUTO: 100 FL (ref 82–102)
MONOCYTES # BLD: 0.38 K/UL (ref 0.1–1.3)
MONOCYTES NFR BLD: 7.3 % (ref 4–12)
NEUTS SEG # BLD: 2.6 K/UL (ref 1.7–8.2)
NEUTS SEG NFR BLD: 49.7 % (ref 43–78)
NRBC # BLD: 0 K/UL (ref 0–0.2)
NT PRO BNP: 1818 PG/ML (ref 0–450)
PLATELET # BLD AUTO: 191 K/UL (ref 150–450)
PMV BLD AUTO: 11.3 FL (ref 9.4–12.3)
RBC # BLD AUTO: 3.06 M/UL (ref 4.05–5.2)
WBC # BLD AUTO: 5.2 K/UL (ref 4.3–11.1)

## 2025-03-14 PROCEDURE — 71046 X-RAY EXAM CHEST 2 VIEWS: CPT

## 2025-03-14 RX ORDER — FUROSEMIDE 20 MG/1
20 TABLET ORAL 2 TIMES DAILY
Qty: 28 TABLET | Refills: 0 | Status: SHIPPED | OUTPATIENT
Start: 2025-03-14 | End: 2025-04-24

## 2025-03-14 ASSESSMENT — PATIENT HEALTH QUESTIONNAIRE - PHQ9
SUM OF ALL RESPONSES TO PHQ QUESTIONS 1-9: 0
SUM OF ALL RESPONSES TO PHQ QUESTIONS 1-9: 0
2. FEELING DOWN, DEPRESSED OR HOPELESS: NOT AT ALL
SUM OF ALL RESPONSES TO PHQ QUESTIONS 1-9: 0
1. LITTLE INTEREST OR PLEASURE IN DOING THINGS: NOT AT ALL
SUM OF ALL RESPONSES TO PHQ QUESTIONS 1-9: 0

## 2025-03-14 ASSESSMENT — ENCOUNTER SYMPTOMS
COUGH: 0
VOMITING: 0
SHORTNESS OF BREATH: 1
BLOOD IN STOOL: 0
NAUSEA: 0
ABDOMINAL PAIN: 0

## 2025-03-14 NOTE — PATIENT INSTRUCTIONS
Try to avoid secondhand smoke too.     Stay at a weight that's healthy for you. Talk to your doctor if you need help losing weight.     Try to get 7 to 9 hours of sleep each night.     Limit alcohol to 2 drinks a day for men and 1 drink a day for women. Too much alcohol can cause health problems.     Manage other health problems such as diabetes, high blood pressure, and high cholesterol. If you think you may have a problem with alcohol or drug use, talk to your doctor.   Medicines    Take your medicines exactly as prescribed. Call your doctor if you think you are having a problem with your medicine.     If your doctor recommends aspirin, take the amount directed each day. Make sure you take aspirin and not another kind of pain reliever, such as acetaminophen (Tylenol).   When should you call for help?   Call 911 if you have symptoms of a heart attack. These may include:    Chest pain or pressure, or a strange feeling in the chest.     Sweating.     Shortness of breath.     Pain, pressure, or a strange feeling in the back, neck, jaw, or upper belly or in one or both shoulders or arms.     Lightheadedness or sudden weakness.     A fast or irregular heartbeat.   After you call 911, the  may tell you to chew 1 adult-strength or 2 to 4 low-dose aspirin. Wait for an ambulance. Do not try to drive yourself.  Watch closely for changes in your health, and be sure to contact your doctor if you have any problems.  Where can you learn more?  Go to https://www.[employer].net/patientEd and enter F075 to learn more about \"A Healthy Heart: Care Instructions.\"  Current as of: July 31, 2024  Content Version: 14.4  © 8294-5638 [employer].   Care instructions adapted under license by [employer]. If you have questions about a medical condition or this instruction, always ask your healthcare professional. [employer] disclaims any warranty or liability for your use of this information.    Personalized

## 2025-03-14 NOTE — PROGRESS NOTES
[facility]  Sariah Claude Marquez,   [address] [place] [address]   No:  [phone]  Fax:  [phone]          Assessment & Plan  1. Medicare annual wellness visit.    2. Shortness of breath on exertion.  The differential diagnosis includes underlying heart failure, anemia, or other cardiopulmonary issues. Hemoglobin was stable at 10.4 on 02/19/2025. A BNP test will be conducted to assess for potential heart failure. A CBC will be added to today's blood work to rule out anemia. An EKG will be performed to check for any changes since the last visit. A chest x-ray will be ordered to evaluate for fluid buildup or other lung issues. She is advised to see Dr. Valiente for further cardiac evaluation . If she experiences chest pain, nausea, left arm pain, left neck pain, or mid-upper back pain, she should go directly to the ER. She should avoid strenuous physical exercise until cleared by Cardiology    3. Sick sinus syndrome.  EKG today without concerning arrhythmia.  Stable left bundle branch block she is under the management of cardiology.    4. Chronic kidney disease, stage 3b.  CMP reviewed below labs.  Most recent CMP ordered by hematology and renal function stable.    5. Essential hypertension.  Blood pressure is well-controlled continue hydralazine    6. Hyperlipidemia.  Cholesterol levels are stable with LDL at 78 and HDL at 46, but triglycerides remain high at 194. She is advised to increase the intake of vegetables and fruits and reduce processed foods.  Continue simvastatin and ezetimibe        Labs:  No results found for this visit on 03/14/25.    Lab on 03/07/2025   Component Date Value Ref Range Status    Cholesterol, Total 03/07/2025 163  0 - 200 MG/DL Final    Comment: Low: Less than or equal to 200 mg/dL  Borderline High: 201-239 mg/dL  High: Greater than or equal to 240 mg/dL      Triglycerides 03/07/2025 194 (H)  0 - 150 MG/DL Final    Comment: Borderline High:

## 2025-03-14 NOTE — PROGRESS NOTES
Medicare Annual Wellness Visit    Vivian Hummel is here for Follow-up (6 month follow up: cholesterol), Medicare AWV, and Shortness of Breath (Shortness of breath/Onset 2 months/Pt stated she is still active just having shortness of breath./Pt denies chest pain )    Assessment & Plan   Medicare annual wellness visit, subsequent  Shortness of breath on exertion  -     CBC with Auto Differential; Future  -     Brain Natriuretic Peptide; Future  -     EKG 12 Lead  -     XR CHEST PA LAT (2 VIEWS); Future  Sick sinus syndrome (HCC)  Chronic kidney disease, stage 3b (HCC)  Essential hypertension  Hyperlipidemia LDL goal <100     No follow-ups on file.     Subjective       Patient's complete Health Risk Assessment and screening values have been reviewed and are found in Flowsheets. The following problems were reviewed today and where indicated follow up appointments were made and/or referrals ordered.    Positive Risk Factor Screenings with Interventions:                Abnormal BMI (obese):  Body mass index is 32.63 kg/m². (!) Abnormal  Interventions:  See AVS for additional education material                           Objective   Vitals:    03/14/25 0904   BP: 118/74   BP Site: Left Upper Arm   Patient Position: Sitting   Pulse: 71   Resp: 16   SpO2: 98%   Weight: 80.9 kg (178 lb 6.4 oz)   Height: 1.575 m (5' 2\")      Body mass index is 32.63 kg/m².                    Allergies   Allergen Reactions    Lisinopril Angioedema     Prior to Visit Medications    Medication Sig Taking? Authorizing Provider   hydrALAZINE (APRESOLINE) 25 MG tablet Take 1 tablet by mouth 3 times daily Yes Fidel Valiente MD   simvastatin (ZOCOR) 40 MG tablet TAKE ONE TABLET BY MOUTH EVERY NIGHT FOR CHOLESTEROL Yes Sariah Marquez, DO   ezetimibe (ZETIA) 10 MG tablet TAKE ONE TABLET BY MOUTH ONE TIME DAILY FOR CHOLESTEROL Yes Sariah Marquez, DO   famotidine (PEPCID) 20 MG tablet TAKE ONE TABLET BY MOUTH TWICE A DAY Yes June

## 2025-03-17 ENCOUNTER — RESULTS FOLLOW-UP (OUTPATIENT)
Dept: GENERAL RADIOLOGY | Age: 80
End: 2025-03-17

## 2025-03-19 ENCOUNTER — LAB (OUTPATIENT)
Dept: FAMILY MEDICINE CLINIC | Facility: CLINIC | Age: 80
End: 2025-03-19

## 2025-03-19 ENCOUNTER — RESULTS FOLLOW-UP (OUTPATIENT)
Dept: FAMILY MEDICINE CLINIC | Facility: CLINIC | Age: 80
End: 2025-03-19

## 2025-03-19 DIAGNOSIS — R79.89 ELEVATED BRAIN NATRIURETIC PEPTIDE (BNP) LEVEL: ICD-10-CM

## 2025-03-19 DIAGNOSIS — R79.89 ELEVATED BRAIN NATRIURETIC PEPTIDE (BNP) LEVEL: Primary | ICD-10-CM

## 2025-03-19 DIAGNOSIS — R06.02 SHORTNESS OF BREATH ON EXERTION: ICD-10-CM

## 2025-03-19 LAB
ANION GAP SERPL CALC-SCNC: 14 MMOL/L (ref 7–16)
BUN SERPL-MCNC: 42 MG/DL (ref 8–23)
CALCIUM SERPL-MCNC: 9.7 MG/DL (ref 8.8–10.2)
CHLORIDE SERPL-SCNC: 100 MMOL/L (ref 98–107)
CO2 SERPL-SCNC: 26 MMOL/L (ref 20–29)
CREAT SERPL-MCNC: 2.41 MG/DL (ref 0.6–1.1)
GLUCOSE SERPL-MCNC: 129 MG/DL (ref 70–99)
POTASSIUM SERPL-SCNC: 3.8 MMOL/L (ref 3.5–5.1)
SODIUM SERPL-SCNC: 140 MMOL/L (ref 136–145)

## 2025-03-25 ENCOUNTER — OFFICE VISIT (OUTPATIENT)
Age: 80
End: 2025-03-25
Payer: MEDICARE

## 2025-03-25 VITALS
SYSTOLIC BLOOD PRESSURE: 120 MMHG | BODY MASS INDEX: 32.39 KG/M2 | HEART RATE: 69 BPM | DIASTOLIC BLOOD PRESSURE: 74 MMHG | WEIGHT: 176 LBS | HEIGHT: 62 IN

## 2025-03-25 DIAGNOSIS — I10 ESSENTIAL HYPERTENSION: Primary | ICD-10-CM

## 2025-03-25 DIAGNOSIS — I34.0 NONRHEUMATIC MITRAL VALVE REGURGITATION: ICD-10-CM

## 2025-03-25 DIAGNOSIS — I20.9 ANGINA, CLASS III: ICD-10-CM

## 2025-03-25 DIAGNOSIS — I44.7 LEFT BUNDLE BRANCH BLOCK: ICD-10-CM

## 2025-03-25 PROCEDURE — 1123F ACP DISCUSS/DSCN MKR DOCD: CPT | Performed by: INTERNAL MEDICINE

## 2025-03-25 PROCEDURE — G8427 DOCREV CUR MEDS BY ELIG CLIN: HCPCS | Performed by: INTERNAL MEDICINE

## 2025-03-25 PROCEDURE — G8417 CALC BMI ABV UP PARAM F/U: HCPCS | Performed by: INTERNAL MEDICINE

## 2025-03-25 PROCEDURE — 3078F DIAST BP <80 MM HG: CPT | Performed by: INTERNAL MEDICINE

## 2025-03-25 PROCEDURE — 99214 OFFICE O/P EST MOD 30 MIN: CPT | Performed by: INTERNAL MEDICINE

## 2025-03-25 PROCEDURE — 93000 ELECTROCARDIOGRAM COMPLETE: CPT | Performed by: INTERNAL MEDICINE

## 2025-03-25 PROCEDURE — 3074F SYST BP LT 130 MM HG: CPT | Performed by: INTERNAL MEDICINE

## 2025-03-25 PROCEDURE — 1036F TOBACCO NON-USER: CPT | Performed by: INTERNAL MEDICINE

## 2025-03-25 PROCEDURE — G8399 PT W/DXA RESULTS DOCUMENT: HCPCS | Performed by: INTERNAL MEDICINE

## 2025-03-25 PROCEDURE — 1090F PRES/ABSN URINE INCON ASSESS: CPT | Performed by: INTERNAL MEDICINE

## 2025-03-25 PROCEDURE — 1159F MED LIST DOCD IN RCRD: CPT | Performed by: INTERNAL MEDICINE

## 2025-03-25 RX ORDER — SODIUM CHLORIDE 9 MG/ML
INJECTION, SOLUTION INTRAVENOUS PRN
OUTPATIENT
Start: 2025-03-25

## 2025-03-25 RX ORDER — SODIUM CHLORIDE 0.9 % (FLUSH) 0.9 %
5-40 SYRINGE (ML) INJECTION EVERY 12 HOURS SCHEDULED
OUTPATIENT
Start: 2025-03-25

## 2025-03-25 RX ORDER — SODIUM CHLORIDE 9 MG/ML
INJECTION, SOLUTION INTRAVENOUS CONTINUOUS
OUTPATIENT
Start: 2025-03-25

## 2025-03-25 RX ORDER — SODIUM CHLORIDE 0.9 % (FLUSH) 0.9 %
5-40 SYRINGE (ML) INJECTION PRN
OUTPATIENT
Start: 2025-03-25

## 2025-03-25 RX ORDER — ASPIRIN 325 MG
325 TABLET ORAL ONCE
OUTPATIENT
Start: 2025-03-25 | End: 2025-03-25

## 2025-03-25 RX ORDER — NITROGLYCERIN 0.4 MG/1
0.4 TABLET SUBLINGUAL EVERY 5 MIN PRN
Qty: 25 TABLET | Refills: 3 | Status: SHIPPED | OUTPATIENT
Start: 2025-03-25

## 2025-03-25 RX ORDER — LORAZEPAM 0.5 MG/1
0.5 TABLET ORAL
OUTPATIENT
Start: 2025-03-25

## 2025-03-25 NOTE — PROGRESS NOTES
[address], [address]  [place] [address]  PHONE: [phone]    SUBJECTIVE:   Vivian Hummel is a 79 y.o. female 1945   seen for a follow up visit regarding the following:     Chief Complaint   Patient presents with    Hypertension         History of Present Illness  The patient, an 79-year-old female, presents with dyspnea, described as a sensation of heavy pressure on her chest, persisting for several months. The dyspnea occurs even with minimal exertion, such as walking short distances. She also reports experiencing chest pain during these episodes. Despite these symptoms, she continues to engage in golfing activities, albeit with difficulty. The patient denies any peripheral edema and has no prior history of similar symptoms. She recalls an episode of pneumothorax while playing golf, which was marked by severe breathlessness without associated pain. Her dietary habits include the consumption of approximately two Dr. Gaines daily and two glasses of wine with friends at a social gathering on .    SOCIAL HISTORY  - Exercise: Plays golf  - Alcohol: 2 glasses of wine with friends at the [employer] shop on   - Caffeine: Drinks a couple of Dr. Peppers a day    FAMILY HISTORY  - Brother:  of heart disease in 2023        Interval history:   She has a previous history of hypertension, as well as spontaneous pneumothorax that was evaluated in 2019.  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  At that time, she was taken off Diltiazem therapy.   No recent syncope, presyncope or any other issue.  She presented for followup 2020.  She was previously seen by Dr. Alex Fagan.        Cardiac History:      Spontaneous pneumothorax.      2016 stress perfusion imaging with normal perfusion.      2018 ECG was reviewed with sinus arrest with underlying escape rhythm (taken

## 2025-03-26 ENCOUNTER — LAB (OUTPATIENT)
Dept: FAMILY MEDICINE CLINIC | Facility: CLINIC | Age: 80
End: 2025-03-26

## 2025-03-26 DIAGNOSIS — R06.02 SHORTNESS OF BREATH ON EXERTION: ICD-10-CM

## 2025-03-26 DIAGNOSIS — R79.89 ELEVATED BRAIN NATRIURETIC PEPTIDE (BNP) LEVEL: ICD-10-CM

## 2025-03-26 LAB
ANION GAP SERPL CALC-SCNC: 13 MMOL/L (ref 7–16)
BUN SERPL-MCNC: 37 MG/DL (ref 8–23)
CALCIUM SERPL-MCNC: 9.4 MG/DL (ref 8.8–10.2)
CHLORIDE SERPL-SCNC: 103 MMOL/L (ref 98–107)
CO2 SERPL-SCNC: 25 MMOL/L (ref 20–29)
CREAT SERPL-MCNC: 2.07 MG/DL (ref 0.6–1.1)
GLUCOSE SERPL-MCNC: 132 MG/DL (ref 70–99)
POTASSIUM SERPL-SCNC: 3.7 MMOL/L (ref 3.5–5.1)
SODIUM SERPL-SCNC: 141 MMOL/L (ref 136–145)

## 2025-03-27 ENCOUNTER — RESULTS FOLLOW-UP (OUTPATIENT)
Dept: FAMILY MEDICINE CLINIC | Facility: CLINIC | Age: 80
End: 2025-03-27

## 2025-03-28 ENCOUNTER — TELEPHONE (OUTPATIENT)
Dept: ONCOLOGY | Age: 80
End: 2025-03-28

## 2025-03-28 NOTE — TELEPHONE ENCOUNTER
Patient is having heart cath Monday needs to cx all appts until May. Patient has treatment attached.

## 2025-03-31 ENCOUNTER — HOSPITAL ENCOUNTER (OUTPATIENT)
Age: 80
Setting detail: OUTPATIENT SURGERY
Discharge: HOME | End: 2025-03-31
Attending: INTERNAL MEDICINE | Admitting: INTERNAL MEDICINE
Payer: MEDICARE

## 2025-03-31 VITALS
WEIGHT: 174 LBS | DIASTOLIC BLOOD PRESSURE: 68 MMHG | TEMPERATURE: 98 F | BODY MASS INDEX: 32.02 KG/M2 | HEART RATE: 63 BPM | OXYGEN SATURATION: 98 % | HEIGHT: 62 IN | RESPIRATION RATE: 19 BRPM | SYSTOLIC BLOOD PRESSURE: 139 MMHG

## 2025-03-31 DIAGNOSIS — I20.9 ANGINA, CLASS III: ICD-10-CM

## 2025-03-31 LAB
ANION GAP SERPL CALC-SCNC: 13 MMOL/L (ref 7–16)
BUN SERPL-MCNC: 19 MG/DL (ref 8–23)
CALCIUM SERPL-MCNC: 9.4 MG/DL (ref 8.8–10.2)
CHLORIDE SERPL-SCNC: 106 MMOL/L (ref 98–107)
CO2 SERPL-SCNC: 23 MMOL/L (ref 20–29)
CREAT SERPL-MCNC: 1.51 MG/DL (ref 0.6–1.1)
ECHO BSA: 1.86 M2
EKG ATRIAL RATE: 67 BPM
EKG DIAGNOSIS: NORMAL
EKG P AXIS: 113 DEGREES
EKG P-R INTERVAL: 210 MS
EKG Q-T INTERVAL: 460 MS
EKG QRS DURATION: 142 MS
EKG QTC CALCULATION (BAZETT): 486 MS
EKG R AXIS: 4 DEGREES
EKG T AXIS: 126 DEGREES
EKG VENTRICULAR RATE: 67 BPM
ERYTHROCYTE [DISTWIDTH] IN BLOOD BY AUTOMATED COUNT: 13.3 % (ref 11.9–14.6)
GLUCOSE SERPL-MCNC: 82 MG/DL (ref 70–99)
HCT VFR BLD AUTO: 31.9 % (ref 35.8–46.3)
HGB BLD-MCNC: 10.9 G/DL (ref 11.7–15.4)
MAGNESIUM SERPL-MCNC: 2.1 MG/DL (ref 1.8–2.4)
MCH RBC QN AUTO: 32.7 PG (ref 26.1–32.9)
MCHC RBC AUTO-ENTMCNC: 34.2 G/DL (ref 31.4–35)
MCV RBC AUTO: 95.8 FL (ref 82–102)
NRBC # BLD: 0 K/UL (ref 0–0.2)
PLATELET # BLD AUTO: 203 K/UL (ref 150–450)
PMV BLD AUTO: 10.3 FL (ref 9.4–12.3)
POTASSIUM SERPL-SCNC: 3.9 MMOL/L (ref 3.5–5.1)
RBC # BLD AUTO: 3.33 M/UL (ref 4.05–5.2)
SODIUM SERPL-SCNC: 141 MMOL/L (ref 136–145)
WBC # BLD AUTO: 6.6 K/UL (ref 4.3–11.1)

## 2025-03-31 PROCEDURE — 83735 ASSAY OF MAGNESIUM: CPT

## 2025-03-31 PROCEDURE — 2500000003 HC RX 250 WO HCPCS: Performed by: INTERNAL MEDICINE

## 2025-03-31 PROCEDURE — 80048 BASIC METABOLIC PNL TOTAL CA: CPT

## 2025-03-31 PROCEDURE — 6360000004 HC RX CONTRAST MEDICATION: Performed by: INTERNAL MEDICINE

## 2025-03-31 PROCEDURE — 2580000003 HC RX 258: Performed by: INTERNAL MEDICINE

## 2025-03-31 PROCEDURE — 93010 ELECTROCARDIOGRAM REPORT: CPT | Performed by: INTERNAL MEDICINE

## 2025-03-31 PROCEDURE — C1769 GUIDE WIRE: HCPCS | Performed by: INTERNAL MEDICINE

## 2025-03-31 PROCEDURE — 85027 COMPLETE CBC AUTOMATED: CPT

## 2025-03-31 PROCEDURE — 93005 ELECTROCARDIOGRAM TRACING: CPT | Performed by: INTERNAL MEDICINE

## 2025-03-31 PROCEDURE — 99152 MOD SED SAME PHYS/QHP 5/>YRS: CPT | Performed by: INTERNAL MEDICINE

## 2025-03-31 PROCEDURE — C1894 INTRO/SHEATH, NON-LASER: HCPCS | Performed by: INTERNAL MEDICINE

## 2025-03-31 PROCEDURE — 93458 L HRT ARTERY/VENTRICLE ANGIO: CPT | Performed by: INTERNAL MEDICINE

## 2025-03-31 PROCEDURE — 2709999900 HC NON-CHARGEABLE SUPPLY: Performed by: INTERNAL MEDICINE

## 2025-03-31 PROCEDURE — 6360000002 HC RX W HCPCS: Performed by: INTERNAL MEDICINE

## 2025-03-31 RX ORDER — SODIUM CHLORIDE 0.9 % (FLUSH) 0.9 %
5-40 SYRINGE (ML) INJECTION EVERY 12 HOURS SCHEDULED
OUTPATIENT
Start: 2025-03-31

## 2025-03-31 RX ORDER — SODIUM CHLORIDE 9 MG/ML
INJECTION, SOLUTION INTRAVENOUS PRN
OUTPATIENT
Start: 2025-03-31

## 2025-03-31 RX ORDER — SODIUM CHLORIDE 9 MG/ML
INJECTION, SOLUTION INTRAVENOUS CONTINUOUS
Status: DISCONTINUED | OUTPATIENT
Start: 2025-03-31 | End: 2025-03-31 | Stop reason: HOSPADM

## 2025-03-31 RX ORDER — SODIUM CHLORIDE 0.9 % (FLUSH) 0.9 %
5-40 SYRINGE (ML) INJECTION PRN
OUTPATIENT
Start: 2025-03-31

## 2025-03-31 RX ORDER — IOPAMIDOL 755 MG/ML
INJECTION, SOLUTION INTRAVASCULAR PRN
Status: DISCONTINUED | OUTPATIENT
Start: 2025-03-31 | End: 2025-03-31 | Stop reason: HOSPADM

## 2025-03-31 RX ORDER — MIDAZOLAM HYDROCHLORIDE 1 MG/ML
INJECTION, SOLUTION INTRAMUSCULAR; INTRAVENOUS PRN
Status: DISCONTINUED | OUTPATIENT
Start: 2025-03-31 | End: 2025-03-31 | Stop reason: HOSPADM

## 2025-03-31 RX ORDER — 0.9 % SODIUM CHLORIDE 0.9 %
250 INTRAVENOUS SOLUTION INTRAVENOUS ONCE
Status: COMPLETED | OUTPATIENT
Start: 2025-03-31 | End: 2025-03-31

## 2025-03-31 RX ORDER — HEPARIN SODIUM 200 [USP'U]/100ML
INJECTION, SOLUTION INTRAVENOUS CONTINUOUS PRN
Status: DISCONTINUED | OUTPATIENT
Start: 2025-03-31 | End: 2025-03-31 | Stop reason: HOSPADM

## 2025-03-31 RX ORDER — ASPIRIN 81 MG/1
324 TABLET, CHEWABLE ORAL ONCE
Status: DISCONTINUED | OUTPATIENT
Start: 2025-03-31 | End: 2025-03-31 | Stop reason: HOSPADM

## 2025-03-31 RX ORDER — LIDOCAINE HYDROCHLORIDE 10 MG/ML
INJECTION, SOLUTION INFILTRATION; PERINEURAL PRN
Status: DISCONTINUED | OUTPATIENT
Start: 2025-03-31 | End: 2025-03-31 | Stop reason: HOSPADM

## 2025-03-31 RX ORDER — ACETAMINOPHEN 325 MG/1
650 TABLET ORAL EVERY 4 HOURS PRN
OUTPATIENT
Start: 2025-03-31

## 2025-03-31 RX ADMIN — SODIUM CHLORIDE 250 ML: 9 INJECTION, SOLUTION INTRAVENOUS at 09:41

## 2025-03-31 NOTE — PROGRESS NOTES
Radial compression band removed at approximately 1500 after slowly reducing air from 12 cc to zero as per hospital protocol.  No bleeding or hematoma noted. 2 x 2 gauze with tegaderm placed over puncture site. The affected extremity is warm and dry to the touch. Frequent vital signs printed and placed on bedside chart.  Patient instructed to call if any bleeding noted on gauze.  Patient verbalized understanding the nursing instructions.

## 2025-03-31 NOTE — DISCHARGE INSTRUCTIONS
HEART CATHETERIZATION/ANGIOGRAPHY DISCHARGE INSTRUCTIONS    Check puncture site frequently for swelling or bleeding. If there is any bleeding, apply pressure over the area with a clean towel or washcloth and call 911. Notify your doctor for any redness, swelling, drainage, or oozing from the puncture site. Notify your doctor for any fever or chills.  If the extremity becomes cold, numb, or painful call [facility] @ [phone].  Activity should be limited for the next 48 hours. No heavy lifting, pushing, pulling  or strenuous activity for 48 hours. No heavy lifting (anything over 10 pounds) for 3 days.  You may resume your usual diet. Drink more fluids than usual.  Have a responsible person drive you home and stay with you for at least 24 hours after your heart catheterization/angiography.  You may remove bandage from your R radial (wrist) in 24 hours. You may shower in 24 hours. No tub baths, hot tubs, or swimming for 1 week. Do not place any lotions, creams, powders, or ointments over puncture site for 1 week. You may place a clean band-aid over the puncture site each day for 5 days. Change daily.        Sedation for a Medical Procedure: Care Instructions     You were given a sedative medication during your visit. While many of the effects will have worn   off before you leave; you may continue to feel some effects for several hours.      Common side effects from sedation include:  Feeling sleepy. (Your doctors and nurses will make sure you are not too sleepy to go home.)  Nausea and vomiting. This usually does not last long.  Feeling tired.     How can you care for yourself at home?  Activity    Don't do anything for 24 hours that requires attention to detail. It takes time for the medicine effects to completely wear off.     Do not make important legal decisions for 24 hours.     Do not sign any legal documents for 24 hours.     Do not drink alcohol today     For your safety, you should not drive or

## 2025-03-31 NOTE — PROGRESS NOTES
Patient received to CPRU room # 11  Ambulatory from [facility]. Patient scheduled for [facility] today with Dr Marroquin. Procedure reviewed & questions answered, voiced good understanding consent obtained & placed on chart. All medications and medical history reviewed. Will prep patient per orders. Patient & family updated on plan of care.      The patient has a fraility score of 3-MANAGING WELL, based on patient A&Ox3, patient able to ambulate to room without difficulty.    Patient took aspirin 324mg at 0630 prior to arrival.

## 2025-03-31 NOTE — PROGRESS NOTES
TRANSFER - OUT REPORT:    Verbal report given to GILMAR roca on Vivian Hummel  being transferred to CPRU for ordered procedure       Report consisted of patient’s Situation, Background, Assessment and Recommendations(SBAR).     Information from the following report(s) Procedure Summary, MAR, and Med Rec Status was reviewed with the receiving nurse.    Opportunity for questions and clarification was provided.        C by Dr. Marroquin  Right radial access  No interventions  Right wrist TR band with 12 mL  1 mg Versed  25 mcg Fentanyl  5,000 units of heparin  Access site soft. Clean, dry, and intact; with no signs of bleeding or hematoma  Pt. Tolerated procedure

## 2025-03-31 NOTE — PROGRESS NOTES
Discharge instructions given per orders, voiced good understanding of post LHC care, medications & follow up care. Denies any questions.    R rad site C/D/I. Sling applied.

## 2025-03-31 NOTE — PROGRESS NOTES
Report received from [facility] RN. Procedural finding communicated. Intra procedural medication administration reviewed. Progression of care discussed.    Patient received into CPRU room 1, [facility] w/ Dr Marroquin    Access site without bleeding or swelling. None noted    Patient instructed to limit movement of R upper extremity.    Routine post procedural vital signs & site assessment initiated.

## 2025-04-24 ENCOUNTER — OFFICE VISIT (OUTPATIENT)
Age: 80
End: 2025-04-24
Payer: MEDICARE

## 2025-04-24 VITALS
BODY MASS INDEX: 32.76 KG/M2 | DIASTOLIC BLOOD PRESSURE: 70 MMHG | SYSTOLIC BLOOD PRESSURE: 138 MMHG | WEIGHT: 178 LBS | OXYGEN SATURATION: 95 % | HEIGHT: 62 IN | HEART RATE: 51 BPM

## 2025-04-24 DIAGNOSIS — I50.30 HEART FAILURE WITH PRESERVED EJECTION FRACTION, UNSPECIFIED HF CHRONICITY (HCC): ICD-10-CM

## 2025-04-24 DIAGNOSIS — I10 ESSENTIAL HYPERTENSION: Primary | ICD-10-CM

## 2025-04-24 DIAGNOSIS — R79.89 ELEVATED BRAIN NATRIURETIC PEPTIDE (BNP) LEVEL: ICD-10-CM

## 2025-04-24 DIAGNOSIS — I44.7 LEFT BUNDLE BRANCH BLOCK: ICD-10-CM

## 2025-04-24 PROCEDURE — 1159F MED LIST DOCD IN RCRD: CPT | Performed by: INTERNAL MEDICINE

## 2025-04-24 PROCEDURE — G8427 DOCREV CUR MEDS BY ELIG CLIN: HCPCS | Performed by: INTERNAL MEDICINE

## 2025-04-24 PROCEDURE — 1090F PRES/ABSN URINE INCON ASSESS: CPT | Performed by: INTERNAL MEDICINE

## 2025-04-24 PROCEDURE — 3075F SYST BP GE 130 - 139MM HG: CPT | Performed by: INTERNAL MEDICINE

## 2025-04-24 PROCEDURE — 99214 OFFICE O/P EST MOD 30 MIN: CPT | Performed by: INTERNAL MEDICINE

## 2025-04-24 PROCEDURE — 3078F DIAST BP <80 MM HG: CPT | Performed by: INTERNAL MEDICINE

## 2025-04-24 PROCEDURE — G8417 CALC BMI ABV UP PARAM F/U: HCPCS | Performed by: INTERNAL MEDICINE

## 2025-04-24 PROCEDURE — G8399 PT W/DXA RESULTS DOCUMENT: HCPCS | Performed by: INTERNAL MEDICINE

## 2025-04-24 PROCEDURE — 1123F ACP DISCUSS/DSCN MKR DOCD: CPT | Performed by: INTERNAL MEDICINE

## 2025-04-24 PROCEDURE — 1036F TOBACCO NON-USER: CPT | Performed by: INTERNAL MEDICINE

## 2025-04-24 RX ORDER — FUROSEMIDE 20 MG/1
TABLET ORAL
Qty: 90 TABLET | Refills: 3 | Status: SHIPPED | OUTPATIENT
Start: 2025-04-24

## 2025-04-24 RX ORDER — DAPAGLIFLOZIN 10 MG/1
10 TABLET, FILM COATED ORAL EVERY MORNING
Qty: 90 TABLET | Refills: 0 | Status: SHIPPED | OUTPATIENT
Start: 2025-04-24

## 2025-04-24 NOTE — PATIENT INSTRUCTIONS
[phone] Toll Free  REGISTERSIGN IN 0   Search    Prescription DrugsNon-Prescription DrugsPet MedsFAQSecurity & PrivacyAbout UsContact Us  Contact Us   Hours of Operation  Mon to Fri, 7:00am - 6:00pm  Sat, 7:30am - 6:00pm  Sun, 7:30am - 6:00pm  All times Central Standard Time (CST)   Phone  Toll-free: [phone]  International: [phone]  (Ashlee mcpherson)   Email  [email or web address]   Mailing Address  Please send all prescriptions and order forms to:  [employer]  [address] [facility]  [address]  [place]   Fax  Toll-free: [phone]  International: [phone]   Fax order form   Live Chat

## 2025-04-24 NOTE — PROGRESS NOTES
Panel; Future    Elevated brain natriuretic peptide (BNP) level  -     furosemide (LASIX) 20 MG tablet; Take 1 tab daily if weight increases by 2 lbs in 24 hours or 4 lbs in 48 hours.    Other orders  -     dapagliflozin (FARXIGA) 10 MG tablet; Take 1 tablet by mouth every morning      Return in about 3 weeks (around 5/15/2025).       Fidel Valiente MD  4/24/2025  2:44 PM      The patient (or guardian, if applicable) and other individuals in attendance with the patient were advised that Artificial Intelligence will be utilized during this visit to record, process the conversation to generate a clinical note, and support improvement of the AI technology. The patient (or guardian, if applicable) and other individuals in attendance at the appointment consented to the use of AI, including the recording.

## 2025-04-25 LAB
ANION GAP SERPL CALC-SCNC: 12 MMOL/L (ref 7–16)
BUN SERPL-MCNC: 25 MG/DL (ref 8–23)
CALCIUM SERPL-MCNC: 9.3 MG/DL (ref 8.8–10.2)
CHLORIDE SERPL-SCNC: 108 MMOL/L (ref 98–107)
CO2 SERPL-SCNC: 23 MMOL/L (ref 20–29)
CREAT SERPL-MCNC: 1.73 MG/DL (ref 0.6–1.1)
GLUCOSE SERPL-MCNC: 102 MG/DL (ref 70–99)
POTASSIUM SERPL-SCNC: 4.2 MMOL/L (ref 3.5–5.1)
SODIUM SERPL-SCNC: 143 MMOL/L (ref 136–145)

## 2025-04-28 ENCOUNTER — RESULTS FOLLOW-UP (OUTPATIENT)
Age: 80
End: 2025-04-28

## 2025-04-28 NOTE — RESULT ENCOUNTER NOTE
Recent lab work shows stable renal function and electrolytes.    Please let me know if you have additional questions or concerns.    Fidel Valiente MD

## 2025-05-07 ENCOUNTER — PATIENT MESSAGE (OUTPATIENT)
Age: 80
End: 2025-05-07

## 2025-05-09 DIAGNOSIS — E78.5 HYPERLIPIDEMIA LDL GOAL <100: ICD-10-CM

## 2025-05-12 RX ORDER — EZETIMIBE 10 MG/1
TABLET ORAL
Qty: 90 TABLET | Refills: 0 | Status: SHIPPED | OUTPATIENT
Start: 2025-05-12

## 2025-05-12 RX ORDER — SIMVASTATIN 40 MG
TABLET ORAL
Qty: 90 TABLET | Refills: 0 | Status: SHIPPED | OUTPATIENT
Start: 2025-05-12

## 2025-05-12 NOTE — TELEPHONE ENCOUNTER
Last OV: 3/14/25  Next OV: 9/19/25 with labs prior.     simvastatin (ZOCOR) 40 MG tablet   Last Written: 2/11/25  For: 90 with 0 refills       ezetimibe (ZETIA) 10 MG tablet   Last Written: 2/11/25   For: 90 with 0 refills.

## 2025-05-14 ENCOUNTER — HOSPITAL ENCOUNTER (OUTPATIENT)
Dept: LAB | Age: 80
Discharge: HOME | End: 2025-05-14
Payer: MEDICARE

## 2025-05-14 ENCOUNTER — HOSPITAL ENCOUNTER (OUTPATIENT)
Dept: INFUSION THERAPY | Age: 80
Setting detail: INFUSION SERIES
End: 2025-05-14

## 2025-05-14 ENCOUNTER — OFFICE VISIT (OUTPATIENT)
Dept: ONCOLOGY | Age: 80
End: 2025-05-14
Payer: MEDICARE

## 2025-05-14 VITALS
RESPIRATION RATE: 16 BRPM | TEMPERATURE: 98.2 F | HEART RATE: 64 BPM | HEIGHT: 62 IN | SYSTOLIC BLOOD PRESSURE: 123 MMHG | DIASTOLIC BLOOD PRESSURE: 75 MMHG | WEIGHT: 181.4 LBS | OXYGEN SATURATION: 96 % | BODY MASS INDEX: 33.38 KG/M2

## 2025-05-14 DIAGNOSIS — R53.83 FATIGUE, UNSPECIFIED TYPE: ICD-10-CM

## 2025-05-14 DIAGNOSIS — D64.9 ANEMIA, UNSPECIFIED TYPE: ICD-10-CM

## 2025-05-14 DIAGNOSIS — D64.9 ANEMIA, UNSPECIFIED TYPE: Primary | ICD-10-CM

## 2025-05-14 LAB
ALBUMIN SERPL-MCNC: 3.9 G/DL (ref 3.2–4.6)
ALBUMIN/GLOB SERPL: 1 (ref 1–1.9)
ALP SERPL-CCNC: 67 U/L (ref 35–104)
ALT SERPL-CCNC: 9 U/L (ref 8–45)
ANION GAP SERPL CALC-SCNC: 13 MMOL/L (ref 7–16)
AST SERPL-CCNC: 27 U/L (ref 15–37)
BASOPHILS # BLD: 0.06 K/UL (ref 0–0.2)
BASOPHILS NFR BLD: 1.2 % (ref 0–2)
BILIRUB SERPL-MCNC: 0.4 MG/DL (ref 0–1.2)
BUN SERPL-MCNC: 29 MG/DL (ref 8–23)
CALCIUM SERPL-MCNC: 9.6 MG/DL (ref 8.8–10.2)
CHLORIDE SERPL-SCNC: 105 MMOL/L (ref 98–107)
CO2 SERPL-SCNC: 23 MMOL/L (ref 20–29)
CREAT SERPL-MCNC: 1.79 MG/DL (ref 0.6–1.1)
DIFFERENTIAL METHOD BLD: ABNORMAL
EOSINOPHIL # BLD: 0.19 K/UL (ref 0–0.8)
EOSINOPHIL NFR BLD: 3.8 % (ref 0.5–7.8)
ERYTHROCYTE [DISTWIDTH] IN BLOOD BY AUTOMATED COUNT: 13.4 % (ref 11.9–14.6)
FERRITIN SERPL-MCNC: 265 NG/ML (ref 8–388)
GLOBULIN SER CALC-MCNC: 4 G/DL (ref 2.3–3.5)
GLUCOSE SERPL-MCNC: 74 MG/DL (ref 70–99)
HCT VFR BLD AUTO: 32.2 % (ref 35.8–46.3)
HGB BLD-MCNC: 10.6 G/DL (ref 11.7–15.4)
IMM GRANULOCYTES # BLD AUTO: 0.03 K/UL (ref 0–0.5)
IMM GRANULOCYTES NFR BLD AUTO: 0.6 % (ref 0–5)
LYMPHOCYTES # BLD: 2.05 K/UL (ref 0.5–4.6)
LYMPHOCYTES NFR BLD: 40.8 % (ref 13–44)
MCH RBC QN AUTO: 33.3 PG (ref 26.1–32.9)
MCHC RBC AUTO-ENTMCNC: 32.9 G/DL (ref 31.4–35)
MCV RBC AUTO: 101.3 FL (ref 82–102)
MONOCYTES # BLD: 0.3 K/UL (ref 0.1–1.3)
MONOCYTES NFR BLD: 6 % (ref 4–12)
NEUTS SEG # BLD: 2.4 K/UL (ref 1.7–8.2)
NEUTS SEG NFR BLD: 47.6 % (ref 43–78)
NRBC # BLD: 0 K/UL (ref 0–0.2)
PLATELET # BLD AUTO: 194 K/UL (ref 150–450)
PMV BLD AUTO: 9.9 FL (ref 9.4–12.3)
POTASSIUM SERPL-SCNC: 3.8 MMOL/L (ref 3.5–5.1)
PROT SERPL-MCNC: 7.9 G/DL (ref 6.3–8.2)
RBC # BLD AUTO: 3.18 M/UL (ref 4.05–5.2)
SODIUM SERPL-SCNC: 141 MMOL/L (ref 136–145)
WBC # BLD AUTO: 5 K/UL (ref 4.3–11.1)

## 2025-05-14 PROCEDURE — 36415 COLL VENOUS BLD VENIPUNCTURE: CPT

## 2025-05-14 PROCEDURE — 1126F AMNT PAIN NOTED NONE PRSNT: CPT | Performed by: NURSE PRACTITIONER

## 2025-05-14 PROCEDURE — 1090F PRES/ABSN URINE INCON ASSESS: CPT | Performed by: NURSE PRACTITIONER

## 2025-05-14 PROCEDURE — 99214 OFFICE O/P EST MOD 30 MIN: CPT | Performed by: NURSE PRACTITIONER

## 2025-05-14 PROCEDURE — 1123F ACP DISCUSS/DSCN MKR DOCD: CPT | Performed by: NURSE PRACTITIONER

## 2025-05-14 PROCEDURE — G8399 PT W/DXA RESULTS DOCUMENT: HCPCS | Performed by: NURSE PRACTITIONER

## 2025-05-14 PROCEDURE — G8417 CALC BMI ABV UP PARAM F/U: HCPCS | Performed by: NURSE PRACTITIONER

## 2025-05-14 PROCEDURE — G8427 DOCREV CUR MEDS BY ELIG CLIN: HCPCS | Performed by: NURSE PRACTITIONER

## 2025-05-14 PROCEDURE — 1036F TOBACCO NON-USER: CPT | Performed by: NURSE PRACTITIONER

## 2025-05-14 PROCEDURE — 82728 ASSAY OF FERRITIN: CPT

## 2025-05-14 PROCEDURE — 1160F RVW MEDS BY RX/DR IN RCRD: CPT | Performed by: NURSE PRACTITIONER

## 2025-05-14 PROCEDURE — 85025 COMPLETE CBC W/AUTO DIFF WBC: CPT

## 2025-05-14 PROCEDURE — 3074F SYST BP LT 130 MM HG: CPT | Performed by: NURSE PRACTITIONER

## 2025-05-14 PROCEDURE — 1159F MED LIST DOCD IN RCRD: CPT | Performed by: NURSE PRACTITIONER

## 2025-05-14 PROCEDURE — 3078F DIAST BP <80 MM HG: CPT | Performed by: NURSE PRACTITIONER

## 2025-05-14 PROCEDURE — 80053 COMPREHEN METABOLIC PANEL: CPT

## 2025-05-14 ASSESSMENT — PATIENT HEALTH QUESTIONNAIRE - PHQ9
SUM OF ALL RESPONSES TO PHQ QUESTIONS 1-9: 0
2. FEELING DOWN, DEPRESSED OR HOPELESS: NOT AT ALL
SUM OF ALL RESPONSES TO PHQ QUESTIONS 1-9: 0
1. LITTLE INTEREST OR PLEASURE IN DOING THINGS: NOT AT ALL

## 2025-05-19 ENCOUNTER — OFFICE VISIT (OUTPATIENT)
Age: 80
End: 2025-05-19
Payer: MEDICARE

## 2025-05-19 VITALS
HEART RATE: 76 BPM | WEIGHT: 180 LBS | DIASTOLIC BLOOD PRESSURE: 72 MMHG | BODY MASS INDEX: 33.13 KG/M2 | SYSTOLIC BLOOD PRESSURE: 138 MMHG | HEIGHT: 62 IN

## 2025-05-19 DIAGNOSIS — I44.7 LEFT BUNDLE BRANCH BLOCK: ICD-10-CM

## 2025-05-19 DIAGNOSIS — I10 ESSENTIAL HYPERTENSION: Primary | ICD-10-CM

## 2025-05-19 DIAGNOSIS — I50.30 HEART FAILURE WITH PRESERVED EJECTION FRACTION, UNSPECIFIED HF CHRONICITY (HCC): ICD-10-CM

## 2025-05-19 PROCEDURE — 1090F PRES/ABSN URINE INCON ASSESS: CPT | Performed by: INTERNAL MEDICINE

## 2025-05-19 PROCEDURE — 1126F AMNT PAIN NOTED NONE PRSNT: CPT | Performed by: INTERNAL MEDICINE

## 2025-05-19 PROCEDURE — 3075F SYST BP GE 130 - 139MM HG: CPT | Performed by: INTERNAL MEDICINE

## 2025-05-19 PROCEDURE — 1123F ACP DISCUSS/DSCN MKR DOCD: CPT | Performed by: INTERNAL MEDICINE

## 2025-05-19 PROCEDURE — 3078F DIAST BP <80 MM HG: CPT | Performed by: INTERNAL MEDICINE

## 2025-05-19 PROCEDURE — 99214 OFFICE O/P EST MOD 30 MIN: CPT | Performed by: INTERNAL MEDICINE

## 2025-05-19 PROCEDURE — G8428 CUR MEDS NOT DOCUMENT: HCPCS | Performed by: INTERNAL MEDICINE

## 2025-05-19 PROCEDURE — G8399 PT W/DXA RESULTS DOCUMENT: HCPCS | Performed by: INTERNAL MEDICINE

## 2025-05-19 PROCEDURE — G8417 CALC BMI ABV UP PARAM F/U: HCPCS | Performed by: INTERNAL MEDICINE

## 2025-05-19 PROCEDURE — 1036F TOBACCO NON-USER: CPT | Performed by: INTERNAL MEDICINE

## 2025-05-19 NOTE — PROGRESS NOTES
[facility] CARDIOLOGY, [address], [address]  [place] [address]  PHONE: [phone]    SUBJECTIVE:   Vivian Hummel is a 80 y.o. female 1945   seen for a follow up visit regarding the following:     Chief Complaint   Patient presents with    Follow-up    Hypertension         History of Present Illness  The patient presents for evaluation of heart failure with preserved ejection fraction (HFpEF) and anemia.    The patient reports significant improvement in respiratory function with the administration of Farxiga 10 mg daily and Lasix as needed. They have resumed playing golf, although they experience difficulty completing an 18-hole course. The patient has gained 1 pound since their last visit, which is likely attributable to increased food and fluid intake. They occasionally consume salt, primarily on tomatoes, a few times per week.    At the last consultation, their hemoglobin level was 10.6     SOCIAL HISTORY  - Exercise: Plays golf  - Diet: Eats tomatoes a few times a week  - Alcohol: Drinks alcohol, especially during celebrations          SOCIAL HISTORY  - Exercise: Plays golf frequently  - Diet: Drinks Bodyarmor while playing golf     Results  - Labs:    - Kidney function test: Improved from 2.4 to 1.5    - Imaging:    - Stress perfusion imaging (2016): Normal perfusion    - Echocardiogram (2019): Normal LV systolic function    - Cardiac catheterization (03/31/2025): Normal epicardial coronary arteries, mildly elevated EDP at 22 mmHg    - Diagnostic Testing:    - ECG (2018): Sinus arrest with escape rhythm    - ECG (08/07/2020): Sinus rhythm, poor progression    - EKG (08/25/2023): Sinus rhythm, LBBB      - Hemoglobin: 10.6 g/dL       Assessment & Plan  HFpEF:  - Farxiga 10 mg daily  - Lasix 20 mg PRN (if weight increases by 2 pounds in 24 hours or 4 pounds in 48 hours)  - Monitor weight regularly  - Limit salt intake to <2000 mg/day  - Discussed potential for cardiac

## 2025-06-10 RX ORDER — FAMOTIDINE 20 MG/1
20 TABLET, FILM COATED ORAL 2 TIMES DAILY
Qty: 60 TABLET | Refills: 3 | OUTPATIENT
Start: 2025-06-10

## 2025-07-09 ENCOUNTER — HOSPITAL ENCOUNTER (OUTPATIENT)
Dept: LAB | Age: 80
Discharge: HOME | End: 2025-07-09
Payer: MEDICARE

## 2025-07-09 ENCOUNTER — HOSPITAL ENCOUNTER (OUTPATIENT)
Dept: INFUSION THERAPY | Age: 80
Setting detail: INFUSION SERIES
End: 2025-07-09

## 2025-07-09 DIAGNOSIS — D64.9 ANEMIA, UNSPECIFIED TYPE: ICD-10-CM

## 2025-07-09 DIAGNOSIS — R53.83 FATIGUE, UNSPECIFIED TYPE: ICD-10-CM

## 2025-07-09 LAB
BASOPHILS # BLD: 0.08 K/UL (ref 0–0.2)
BASOPHILS NFR BLD: 1.4 % (ref 0–2)
DIFFERENTIAL METHOD BLD: ABNORMAL
EOSINOPHIL # BLD: 0.22 K/UL (ref 0–0.8)
EOSINOPHIL NFR BLD: 3.8 % (ref 0.5–7.8)
ERYTHROCYTE [DISTWIDTH] IN BLOOD BY AUTOMATED COUNT: 13.3 % (ref 11.9–14.6)
HCT VFR BLD AUTO: 33.9 % (ref 35.8–46.3)
HGB BLD-MCNC: 10.9 G/DL (ref 11.7–15.4)
IMM GRANULOCYTES # BLD AUTO: 0.03 K/UL (ref 0–0.5)
IMM GRANULOCYTES NFR BLD AUTO: 0.5 % (ref 0–5)
LYMPHOCYTES # BLD: 2.69 K/UL (ref 0.5–4.6)
LYMPHOCYTES NFR BLD: 47 % (ref 13–44)
MCH RBC QN AUTO: 33 PG (ref 26.1–32.9)
MCHC RBC AUTO-ENTMCNC: 32.2 G/DL (ref 31.4–35)
MCV RBC AUTO: 102.7 FL (ref 82–102)
MONOCYTES # BLD: 0.34 K/UL (ref 0.1–1.3)
MONOCYTES NFR BLD: 5.9 % (ref 4–12)
NEUTS SEG # BLD: 2.36 K/UL (ref 1.7–8.2)
NEUTS SEG NFR BLD: 41.4 % (ref 43–78)
NRBC # BLD: 0 K/UL (ref 0–0.2)
PLATELET # BLD AUTO: 192 K/UL (ref 150–450)
PMV BLD AUTO: 10.1 FL (ref 9.4–12.3)
RBC # BLD AUTO: 3.3 M/UL (ref 4.05–5.2)
TSH W FREE THYROID IF ABNORMAL: 2.1 UIU/ML (ref 0.27–4.2)
WBC # BLD AUTO: 5.7 K/UL (ref 4.3–11.1)

## 2025-07-09 PROCEDURE — 84443 ASSAY THYROID STIM HORMONE: CPT

## 2025-07-09 PROCEDURE — 36415 COLL VENOUS BLD VENIPUNCTURE: CPT

## 2025-07-09 PROCEDURE — 85025 COMPLETE CBC W/AUTO DIFF WBC: CPT

## 2025-07-09 RX ORDER — FAMOTIDINE 20 MG/1
20 TABLET, FILM COATED ORAL 2 TIMES DAILY
Qty: 60 TABLET | Refills: 3 | Status: SHIPPED | OUTPATIENT
Start: 2025-07-09

## 2025-08-15 DIAGNOSIS — E78.5 HYPERLIPIDEMIA LDL GOAL <100: ICD-10-CM

## 2025-08-18 RX ORDER — SIMVASTATIN 40 MG
TABLET ORAL
Qty: 90 TABLET | Refills: 0 | Status: SHIPPED | OUTPATIENT
Start: 2025-08-18

## 2025-09-03 ENCOUNTER — HOSPITAL ENCOUNTER (OUTPATIENT)
Dept: LAB | Age: 80
Discharge: HOME | End: 2025-09-03
Payer: MEDICARE

## 2025-09-03 ENCOUNTER — OFFICE VISIT (OUTPATIENT)
Dept: ONCOLOGY | Age: 80
End: 2025-09-03
Payer: MEDICARE

## 2025-09-03 VITALS
HEIGHT: 62 IN | SYSTOLIC BLOOD PRESSURE: 150 MMHG | HEART RATE: 55 BPM | DIASTOLIC BLOOD PRESSURE: 69 MMHG | BODY MASS INDEX: 32.55 KG/M2 | RESPIRATION RATE: 16 BRPM | WEIGHT: 176.9 LBS | TEMPERATURE: 97.6 F

## 2025-09-03 DIAGNOSIS — N18.30 STAGE 3 CHRONIC KIDNEY DISEASE, UNSPECIFIED WHETHER STAGE 3A OR 3B CKD (HCC): ICD-10-CM

## 2025-09-03 DIAGNOSIS — D64.9 ANEMIA, UNSPECIFIED TYPE: ICD-10-CM

## 2025-09-03 DIAGNOSIS — D64.9 ANEMIA, UNSPECIFIED TYPE: Primary | ICD-10-CM

## 2025-09-03 LAB
ALBUMIN SERPL-MCNC: 3.9 G/DL (ref 3.2–4.6)
ALBUMIN/GLOB SERPL: 1 (ref 1–1.9)
ALP SERPL-CCNC: 64 U/L (ref 35–104)
ALT SERPL-CCNC: 16 U/L (ref 8–45)
ANION GAP SERPL CALC-SCNC: 14 MMOL/L (ref 7–16)
AST SERPL-CCNC: 31 U/L (ref 15–37)
BASOPHILS # BLD: 0.09 K/UL (ref 0–0.2)
BASOPHILS NFR BLD: 1.5 % (ref 0–2)
BILIRUB SERPL-MCNC: 0.4 MG/DL (ref 0–1.2)
BUN SERPL-MCNC: 31 MG/DL (ref 8–23)
CALCIUM SERPL-MCNC: 9.5 MG/DL (ref 8.8–10.2)
CHLORIDE SERPL-SCNC: 103 MMOL/L (ref 98–107)
CO2 SERPL-SCNC: 21 MMOL/L (ref 20–29)
CREAT SERPL-MCNC: 1.64 MG/DL (ref 0.6–1.1)
DIFFERENTIAL METHOD BLD: ABNORMAL
EOSINOPHIL # BLD: 0.25 K/UL (ref 0–0.8)
EOSINOPHIL NFR BLD: 4.1 % (ref 0.5–7.8)
ERYTHROCYTE [DISTWIDTH] IN BLOOD BY AUTOMATED COUNT: 12.9 % (ref 11.9–14.6)
FERRITIN SERPL-MCNC: 333 NG/ML (ref 8–388)
GLOBULIN SER CALC-MCNC: 4 G/DL (ref 2.3–3.5)
GLUCOSE SERPL-MCNC: 63 MG/DL (ref 70–99)
HCT VFR BLD AUTO: 35.6 % (ref 35.8–46.3)
HGB BLD-MCNC: 11.3 G/DL (ref 11.7–15.4)
IMM GRANULOCYTES # BLD AUTO: 0.03 K/UL (ref 0–0.5)
IMM GRANULOCYTES NFR BLD AUTO: 0.5 % (ref 0–5)
LYMPHOCYTES # BLD: 2.15 K/UL (ref 0.5–4.6)
LYMPHOCYTES NFR BLD: 34.9 % (ref 13–44)
MCH RBC QN AUTO: 32.2 PG (ref 26.1–32.9)
MCHC RBC AUTO-ENTMCNC: 31.7 G/DL (ref 31.4–35)
MCV RBC AUTO: 101.4 FL (ref 82–102)
MONOCYTES # BLD: 0.46 K/UL (ref 0.1–1.3)
MONOCYTES NFR BLD: 7.5 % (ref 4–12)
NEUTS SEG # BLD: 3.18 K/UL (ref 1.7–8.2)
NEUTS SEG NFR BLD: 51.5 % (ref 43–78)
NRBC # BLD: 0 K/UL (ref 0–0.2)
PLATELET # BLD AUTO: 198 K/UL (ref 150–450)
PMV BLD AUTO: 10.4 FL (ref 9.4–12.3)
POTASSIUM SERPL-SCNC: 3.9 MMOL/L (ref 3.5–5.1)
PROT SERPL-MCNC: 7.9 G/DL (ref 6.3–8.2)
RBC # BLD AUTO: 3.51 M/UL (ref 4.05–5.2)
SODIUM SERPL-SCNC: 138 MMOL/L (ref 136–145)
WBC # BLD AUTO: 6.2 K/UL (ref 4.3–11.1)

## 2025-09-03 PROCEDURE — G8399 PT W/DXA RESULTS DOCUMENT: HCPCS | Performed by: INTERNAL MEDICINE

## 2025-09-03 PROCEDURE — 1123F ACP DISCUSS/DSCN MKR DOCD: CPT | Performed by: INTERNAL MEDICINE

## 2025-09-03 PROCEDURE — 36415 COLL VENOUS BLD VENIPUNCTURE: CPT

## 2025-09-03 PROCEDURE — 3077F SYST BP >= 140 MM HG: CPT | Performed by: INTERNAL MEDICINE

## 2025-09-03 PROCEDURE — 1036F TOBACCO NON-USER: CPT | Performed by: INTERNAL MEDICINE

## 2025-09-03 PROCEDURE — G8417 CALC BMI ABV UP PARAM F/U: HCPCS | Performed by: INTERNAL MEDICINE

## 2025-09-03 PROCEDURE — 1090F PRES/ABSN URINE INCON ASSESS: CPT | Performed by: INTERNAL MEDICINE

## 2025-09-03 PROCEDURE — 1126F AMNT PAIN NOTED NONE PRSNT: CPT | Performed by: INTERNAL MEDICINE

## 2025-09-03 PROCEDURE — 80053 COMPREHEN METABOLIC PANEL: CPT

## 2025-09-03 PROCEDURE — 85025 COMPLETE CBC W/AUTO DIFF WBC: CPT

## 2025-09-03 PROCEDURE — G8428 CUR MEDS NOT DOCUMENT: HCPCS | Performed by: INTERNAL MEDICINE

## 2025-09-03 PROCEDURE — 82728 ASSAY OF FERRITIN: CPT

## 2025-09-03 PROCEDURE — 3078F DIAST BP <80 MM HG: CPT | Performed by: INTERNAL MEDICINE

## 2025-09-03 PROCEDURE — 99213 OFFICE O/P EST LOW 20 MIN: CPT | Performed by: INTERNAL MEDICINE

## 2025-09-03 ASSESSMENT — PATIENT HEALTH QUESTIONNAIRE - PHQ9
SUM OF ALL RESPONSES TO PHQ QUESTIONS 1-9: 0
SUM OF ALL RESPONSES TO PHQ QUESTIONS 1-9: 0
2. FEELING DOWN, DEPRESSED OR HOPELESS: NOT AT ALL
SUM OF ALL RESPONSES TO PHQ QUESTIONS 1-9: 0
SUM OF ALL RESPONSES TO PHQ QUESTIONS 1-9: 0
1. LITTLE INTEREST OR PLEASURE IN DOING THINGS: NOT AT ALL

## 2025-09-07 DIAGNOSIS — E78.5 HYPERLIPIDEMIA LDL GOAL <100: Primary | ICD-10-CM

## (undated) DEVICE — CATHETER COR DIAG JUDKINS R 5.0 CRV 5FR 100CM 0 SIDE H

## (undated) DEVICE — GLIDESHEATH SLENDER STAINLESS STEEL KIT: Brand: GLIDESHEATH SLENDER

## (undated) DEVICE — BAND COMPR L24CM REG CLR PLAS HEMSTAT EXT HK AND LOOP RETEN

## (undated) DEVICE — CATHETER DIAG 5FR L100CM LUMN ID0.047IN JL3.5 CRV 0 SIDE H

## (undated) DEVICE — PREP CHLORAPREP 10.5 ML ORG --

## (undated) DEVICE — STERILE POLYISOPRENE POWDER-FREE SURGICAL GLOVES: Brand: PROTEXIS

## (undated) DEVICE — GUIDEWIRE VASC L260CM DIA0.035IN RAD 3MM J TIP L7CM PTFE

## (undated) DEVICE — TRAY PROC 13FR L10CM RED SIGNAL DIAPH FLX CATH SYR ACT DRN

## (undated) DEVICE — GEL MEDC ULTRASOUND 5L -- REPLACED BY 326862